# Patient Record
Sex: FEMALE | Race: WHITE | NOT HISPANIC OR LATINO | URBAN - METROPOLITAN AREA
[De-identification: names, ages, dates, MRNs, and addresses within clinical notes are randomized per-mention and may not be internally consistent; named-entity substitution may affect disease eponyms.]

---

## 2017-09-13 ENCOUNTER — INPATIENT (INPATIENT)
Facility: HOSPITAL | Age: 82
LOS: 2 days | Discharge: HOME | End: 2017-09-16
Attending: SURGERY | Admitting: INTERNAL MEDICINE

## 2017-09-13 DIAGNOSIS — K85.10 BILIARY ACUTE PANCREATITIS WITHOUT NECROSIS OR INFECTION: ICD-10-CM

## 2017-09-13 DIAGNOSIS — I82.409 ACUTE EMBOLISM AND THROMBOSIS OF UNSPECIFIED DEEP VEINS OF UNSPECIFIED LOWER EXTREMITY: ICD-10-CM

## 2017-09-13 DIAGNOSIS — I50.9 HEART FAILURE, UNSPECIFIED: ICD-10-CM

## 2017-09-13 DIAGNOSIS — E78.5 HYPERLIPIDEMIA, UNSPECIFIED: ICD-10-CM

## 2017-09-13 DIAGNOSIS — I10 ESSENTIAL (PRIMARY) HYPERTENSION: ICD-10-CM

## 2017-09-18 ENCOUNTER — TRANSCRIPTION ENCOUNTER (OUTPATIENT)
Age: 82
End: 2017-09-18

## 2017-09-18 PROBLEM — Z00.00 ENCOUNTER FOR PREVENTIVE HEALTH EXAMINATION: Status: ACTIVE | Noted: 2017-09-18

## 2017-09-20 DIAGNOSIS — E78.5 HYPERLIPIDEMIA, UNSPECIFIED: ICD-10-CM

## 2017-09-20 DIAGNOSIS — K85.10 BILIARY ACUTE PANCREATITIS WITHOUT NECROSIS OR INFECTION: ICD-10-CM

## 2017-09-20 DIAGNOSIS — Z86.718 PERSONAL HISTORY OF OTHER VENOUS THROMBOSIS AND EMBOLISM: ICD-10-CM

## 2017-09-20 DIAGNOSIS — K80.00 CALCULUS OF GALLBLADDER WITH ACUTE CHOLECYSTITIS WITHOUT OBSTRUCTION: ICD-10-CM

## 2017-09-20 DIAGNOSIS — I50.9 HEART FAILURE, UNSPECIFIED: ICD-10-CM

## 2017-09-20 DIAGNOSIS — I11.0 HYPERTENSIVE HEART DISEASE WITH HEART FAILURE: ICD-10-CM

## 2017-09-21 ENCOUNTER — APPOINTMENT (OUTPATIENT)
Dept: SURGERY | Facility: CLINIC | Age: 82
End: 2017-09-21
Payer: MEDICARE

## 2017-09-21 VITALS
SYSTOLIC BLOOD PRESSURE: 158 MMHG | BODY MASS INDEX: 19.69 KG/M2 | HEIGHT: 62 IN | DIASTOLIC BLOOD PRESSURE: 66 MMHG | WEIGHT: 107 LBS

## 2017-09-21 DIAGNOSIS — R19.7 DIARRHEA, UNSPECIFIED: ICD-10-CM

## 2017-09-21 DIAGNOSIS — K81.0 ACUTE CHOLECYSTITIS: ICD-10-CM

## 2017-09-21 PROCEDURE — 99024 POSTOP FOLLOW-UP VISIT: CPT | Mod: NC

## 2017-09-21 RX ORDER — METRONIDAZOLE 500 MG/1
500 TABLET ORAL 3 TIMES DAILY
Qty: 42 | Refills: 0 | Status: ACTIVE | COMMUNITY
Start: 2017-09-21 | End: 1900-01-01

## 2017-10-05 ENCOUNTER — APPOINTMENT (OUTPATIENT)
Dept: SURGERY | Facility: CLINIC | Age: 82
End: 2017-10-05

## 2018-03-28 ENCOUNTER — OUTPATIENT (OUTPATIENT)
Dept: OUTPATIENT SERVICES | Facility: HOSPITAL | Age: 83
LOS: 1 days | Discharge: HOME | End: 2018-03-28

## 2018-03-28 DIAGNOSIS — Z13.9 ENCOUNTER FOR SCREENING, UNSPECIFIED: ICD-10-CM

## 2018-03-28 DIAGNOSIS — D75.9 DISEASE OF BLOOD AND BLOOD-FORMING ORGANS, UNSPECIFIED: ICD-10-CM

## 2018-03-28 DIAGNOSIS — Z13.220 ENCOUNTER FOR SCREENING FOR LIPOID DISORDERS: ICD-10-CM

## 2018-03-28 DIAGNOSIS — E11.9 TYPE 2 DIABETES MELLITUS WITHOUT COMPLICATIONS: ICD-10-CM

## 2018-03-28 DIAGNOSIS — Z00.01 ENCOUNTER FOR GENERAL ADULT MEDICAL EXAMINATION WITH ABNORMAL FINDINGS: ICD-10-CM

## 2018-06-30 ENCOUNTER — INPATIENT (INPATIENT)
Facility: HOSPITAL | Age: 83
LOS: 1 days | Discharge: HOME | End: 2018-07-02
Attending: INTERNAL MEDICINE | Admitting: INTERNAL MEDICINE

## 2018-06-30 VITALS
TEMPERATURE: 100 F | SYSTOLIC BLOOD PRESSURE: 113 MMHG | RESPIRATION RATE: 18 BRPM | DIASTOLIC BLOOD PRESSURE: 59 MMHG | HEART RATE: 106 BPM | OXYGEN SATURATION: 96 %

## 2018-06-30 DIAGNOSIS — Z96.643 PRESENCE OF ARTIFICIAL HIP JOINT, BILATERAL: Chronic | ICD-10-CM

## 2018-06-30 DIAGNOSIS — Z90.49 ACQUIRED ABSENCE OF OTHER SPECIFIED PARTS OF DIGESTIVE TRACT: Chronic | ICD-10-CM

## 2018-06-30 LAB
ALBUMIN SERPL ELPH-MCNC: 3.8 G/DL — SIGNIFICANT CHANGE UP (ref 3.5–5.2)
ALP SERPL-CCNC: 74 U/L — SIGNIFICANT CHANGE UP (ref 30–115)
ALT FLD-CCNC: 11 U/L — SIGNIFICANT CHANGE UP (ref 0–41)
ANION GAP SERPL CALC-SCNC: 12 MMOL/L — SIGNIFICANT CHANGE UP (ref 7–14)
APPEARANCE UR: (no result)
APTT BLD: 33.9 SEC — SIGNIFICANT CHANGE UP (ref 27–39.2)
AST SERPL-CCNC: 17 U/L — SIGNIFICANT CHANGE UP (ref 0–41)
BACTERIA # UR AUTO: (no result) /HPF
BASE EXCESS BLDV CALC-SCNC: 2.1 MMOL/L — HIGH (ref -2–2)
BASOPHILS # BLD AUTO: 0.01 K/UL — SIGNIFICANT CHANGE UP (ref 0–0.2)
BASOPHILS NFR BLD AUTO: 0.1 % — SIGNIFICANT CHANGE UP (ref 0–1)
BILIRUB DIRECT SERPL-MCNC: <0.2 MG/DL — SIGNIFICANT CHANGE UP (ref 0–0.2)
BILIRUB INDIRECT FLD-MCNC: >0.1 MG/DL — LOW (ref 0.2–1.2)
BILIRUB SERPL-MCNC: 0.3 MG/DL — SIGNIFICANT CHANGE UP (ref 0.2–1.2)
BILIRUB SERPL-MCNC: 0.3 MG/DL — SIGNIFICANT CHANGE UP (ref 0.2–1.2)
BILIRUB UR-MCNC: NEGATIVE — SIGNIFICANT CHANGE UP
BUN SERPL-MCNC: 30 MG/DL — HIGH (ref 10–20)
CA-I SERPL-SCNC: 1.37 MMOL/L — HIGH (ref 1.12–1.3)
CALCIUM SERPL-MCNC: 10.3 MG/DL — HIGH (ref 8.5–10.1)
CHLORIDE SERPL-SCNC: 104 MMOL/L — SIGNIFICANT CHANGE UP (ref 98–110)
CK MB CFR SERPL CALC: 4.9 NG/ML — SIGNIFICANT CHANGE UP (ref 0.6–6.3)
CK SERPL-CCNC: 61 U/L — SIGNIFICANT CHANGE UP (ref 0–225)
CO2 SERPL-SCNC: 26 MMOL/L — SIGNIFICANT CHANGE UP (ref 17–32)
COLOR SPEC: YELLOW — SIGNIFICANT CHANGE UP
CREAT SERPL-MCNC: 1.1 MG/DL — SIGNIFICANT CHANGE UP (ref 0.7–1.5)
DIFF PNL FLD: NEGATIVE — SIGNIFICANT CHANGE UP
EOSINOPHIL # BLD AUTO: 0.03 K/UL — SIGNIFICANT CHANGE UP (ref 0–0.7)
EOSINOPHIL NFR BLD AUTO: 0.3 % — SIGNIFICANT CHANGE UP (ref 0–8)
GAS PNL BLDV: 145 MMOL/L — SIGNIFICANT CHANGE UP (ref 136–145)
GAS PNL BLDV: SIGNIFICANT CHANGE UP
GLUCOSE SERPL-MCNC: 113 MG/DL — HIGH (ref 70–99)
GLUCOSE UR QL: NEGATIVE MG/DL — SIGNIFICANT CHANGE UP
HCO3 BLDV-SCNC: 29 MMOL/L — SIGNIFICANT CHANGE UP (ref 22–29)
HCT VFR BLD CALC: 38.4 % — SIGNIFICANT CHANGE UP (ref 37–47)
HCT VFR BLDA CALC: 42.9 % — SIGNIFICANT CHANGE UP (ref 34–44)
HGB BLD CALC-MCNC: 14 G/DL — SIGNIFICANT CHANGE UP (ref 14–18)
HGB BLD-MCNC: 13 G/DL — SIGNIFICANT CHANGE UP (ref 12–16)
IMM GRANULOCYTES NFR BLD AUTO: 0.5 % — HIGH (ref 0.1–0.3)
INR BLD: 1.09 RATIO — SIGNIFICANT CHANGE UP (ref 0.65–1.3)
KETONES UR-MCNC: NEGATIVE — SIGNIFICANT CHANGE UP
LACTATE BLDV-MCNC: 1.9 MMOL/L — HIGH (ref 0.5–1.6)
LACTATE SERPL-SCNC: 2 MMOL/L — SIGNIFICANT CHANGE UP (ref 0.5–2.2)
LEUKOCYTE ESTERASE UR-ACNC: NEGATIVE — SIGNIFICANT CHANGE UP
LIDOCAIN IGE QN: 18 U/L — SIGNIFICANT CHANGE UP (ref 7–60)
LYMPHOCYTES # BLD AUTO: 0.99 K/UL — LOW (ref 1.2–3.4)
LYMPHOCYTES # BLD AUTO: 8.8 % — LOW (ref 20.5–51.1)
MAGNESIUM SERPL-MCNC: 1.9 MG/DL — SIGNIFICANT CHANGE UP (ref 1.8–2.4)
MCHC RBC-ENTMCNC: 31 PG — SIGNIFICANT CHANGE UP (ref 27–31)
MCHC RBC-ENTMCNC: 33.9 G/DL — SIGNIFICANT CHANGE UP (ref 32–37)
MCV RBC AUTO: 91.4 FL — SIGNIFICANT CHANGE UP (ref 81–99)
MONOCYTES # BLD AUTO: 0.57 K/UL — SIGNIFICANT CHANGE UP (ref 0.1–0.6)
MONOCYTES NFR BLD AUTO: 5.1 % — SIGNIFICANT CHANGE UP (ref 1.7–9.3)
NEUTROPHILS # BLD AUTO: 9.61 K/UL — HIGH (ref 1.4–6.5)
NEUTROPHILS NFR BLD AUTO: 85.2 % — HIGH (ref 42.2–75.2)
NITRITE UR-MCNC: POSITIVE
NRBC # BLD: 0 /100 WBCS — SIGNIFICANT CHANGE UP (ref 0–0)
NT-PROBNP SERPL-SCNC: 5744 PG/ML — HIGH (ref 0–300)
PCO2 BLDV: 53 MMHG — HIGH (ref 41–51)
PH BLDV: 7.34 — SIGNIFICANT CHANGE UP (ref 7.26–7.43)
PH UR: 6 — SIGNIFICANT CHANGE UP (ref 5–8)
PLATELET # BLD AUTO: 197 K/UL — SIGNIFICANT CHANGE UP (ref 130–400)
PO2 BLDV: 35 MMHG — SIGNIFICANT CHANGE UP (ref 20–40)
POTASSIUM BLDV-SCNC: 4.6 MMOL/L — SIGNIFICANT CHANGE UP (ref 3.3–5.6)
POTASSIUM SERPL-MCNC: 4.8 MMOL/L — SIGNIFICANT CHANGE UP (ref 3.5–5)
POTASSIUM SERPL-SCNC: 4.8 MMOL/L — SIGNIFICANT CHANGE UP (ref 3.5–5)
PROT SERPL-MCNC: 6.3 G/DL — SIGNIFICANT CHANGE UP (ref 6–8)
PROT UR-MCNC: (no result) MG/DL
PROTHROM AB SERPL-ACNC: 11.8 SEC — SIGNIFICANT CHANGE UP (ref 9.95–12.87)
RBC # BLD: 4.2 M/UL — SIGNIFICANT CHANGE UP (ref 4.2–5.4)
RBC # FLD: 13.1 % — SIGNIFICANT CHANGE UP (ref 11.5–14.5)
SAO2 % BLDV: 64 % — SIGNIFICANT CHANGE UP
SODIUM SERPL-SCNC: 142 MMOL/L — SIGNIFICANT CHANGE UP (ref 135–146)
SP GR SPEC: 1.01 — SIGNIFICANT CHANGE UP (ref 1.01–1.03)
TROPONIN T SERPL-MCNC: 0.06 NG/ML — CRITICAL HIGH
UROBILINOGEN FLD QL: 0.2 MG/DL — SIGNIFICANT CHANGE UP (ref 0.2–0.2)
WBC # BLD: 11.27 K/UL — HIGH (ref 4.8–10.8)
WBC # FLD AUTO: 11.27 K/UL — HIGH (ref 4.8–10.8)

## 2018-06-30 RX ORDER — FUROSEMIDE 40 MG
40 TABLET ORAL ONCE
Qty: 0 | Refills: 0 | Status: DISCONTINUED | OUTPATIENT
Start: 2018-06-30 | End: 2018-06-30

## 2018-06-30 RX ORDER — ACETAMINOPHEN 500 MG
650 TABLET ORAL ONCE
Qty: 0 | Refills: 0 | Status: COMPLETED | OUTPATIENT
Start: 2018-06-30 | End: 2018-06-30

## 2018-06-30 RX ORDER — HEPARIN SODIUM 5000 [USP'U]/ML
5000 INJECTION INTRAVENOUS; SUBCUTANEOUS EVERY 12 HOURS
Qty: 0 | Refills: 0 | Status: DISCONTINUED | OUTPATIENT
Start: 2018-06-30 | End: 2018-07-02

## 2018-06-30 RX ORDER — SODIUM CHLORIDE 9 MG/ML
1000 INJECTION INTRAMUSCULAR; INTRAVENOUS; SUBCUTANEOUS ONCE
Qty: 0 | Refills: 0 | Status: COMPLETED | OUTPATIENT
Start: 2018-06-30 | End: 2018-06-30

## 2018-06-30 RX ORDER — AZITHROMYCIN 500 MG/1
500 TABLET, FILM COATED ORAL ONCE
Qty: 0 | Refills: 0 | Status: COMPLETED | OUTPATIENT
Start: 2018-06-30 | End: 2018-06-30

## 2018-06-30 RX ORDER — FERROUS SULFATE 325(65) MG
325 TABLET ORAL EVERY 12 HOURS
Qty: 0 | Refills: 0 | Status: DISCONTINUED | OUTPATIENT
Start: 2018-06-30 | End: 2018-07-02

## 2018-06-30 RX ORDER — AZITHROMYCIN 500 MG/1
TABLET, FILM COATED ORAL
Qty: 0 | Refills: 0 | Status: DISCONTINUED | OUTPATIENT
Start: 2018-06-30 | End: 2018-07-02

## 2018-06-30 RX ORDER — CEFTRIAXONE 500 MG/1
1 INJECTION, POWDER, FOR SOLUTION INTRAMUSCULAR; INTRAVENOUS ONCE
Qty: 0 | Refills: 0 | Status: COMPLETED | OUTPATIENT
Start: 2018-06-30 | End: 2018-06-30

## 2018-06-30 RX ORDER — CEFTRIAXONE 500 MG/1
2 INJECTION, POWDER, FOR SOLUTION INTRAMUSCULAR; INTRAVENOUS EVERY 24 HOURS
Qty: 0 | Refills: 0 | Status: DISCONTINUED | OUTPATIENT
Start: 2018-06-30 | End: 2018-07-02

## 2018-06-30 RX ORDER — ASPIRIN/CALCIUM CARB/MAGNESIUM 324 MG
325 TABLET ORAL ONCE
Qty: 0 | Refills: 0 | Status: COMPLETED | OUTPATIENT
Start: 2018-06-30 | End: 2018-06-30

## 2018-06-30 RX ORDER — ATORVASTATIN CALCIUM 80 MG/1
10 TABLET, FILM COATED ORAL AT BEDTIME
Qty: 0 | Refills: 0 | Status: DISCONTINUED | OUTPATIENT
Start: 2018-06-30 | End: 2018-07-02

## 2018-06-30 RX ORDER — AZITHROMYCIN 500 MG/1
500 TABLET, FILM COATED ORAL EVERY 24 HOURS
Qty: 0 | Refills: 0 | Status: DISCONTINUED | OUTPATIENT
Start: 2018-07-01 | End: 2018-07-02

## 2018-06-30 RX ORDER — TETANUS TOXOID, REDUCED DIPHTHERIA TOXOID AND ACELLULAR PERTUSSIS VACCINE, ADSORBED 5; 2.5; 8; 8; 2.5 [IU]/.5ML; [IU]/.5ML; UG/.5ML; UG/.5ML; UG/.5ML
0.5 SUSPENSION INTRAMUSCULAR ONCE
Qty: 0 | Refills: 0 | Status: COMPLETED | OUTPATIENT
Start: 2018-06-30 | End: 2018-06-30

## 2018-06-30 RX ADMIN — ATORVASTATIN CALCIUM 10 MILLIGRAM(S): 80 TABLET, FILM COATED ORAL at 22:14

## 2018-06-30 RX ADMIN — CEFTRIAXONE 100 GRAM(S): 500 INJECTION, POWDER, FOR SOLUTION INTRAMUSCULAR; INTRAVENOUS at 16:04

## 2018-06-30 RX ADMIN — Medication 650 MILLIGRAM(S): at 16:03

## 2018-06-30 RX ADMIN — TETANUS TOXOID, REDUCED DIPHTHERIA TOXOID AND ACELLULAR PERTUSSIS VACCINE, ADSORBED 0.5 MILLILITER(S): 5; 2.5; 8; 8; 2.5 SUSPENSION INTRAMUSCULAR at 13:36

## 2018-06-30 RX ADMIN — AZITHROMYCIN 255 MILLIGRAM(S): 500 TABLET, FILM COATED ORAL at 18:38

## 2018-06-30 RX ADMIN — SODIUM CHLORIDE 1000 MILLILITER(S): 9 INJECTION INTRAMUSCULAR; INTRAVENOUS; SUBCUTANEOUS at 13:54

## 2018-06-30 RX ADMIN — Medication 650 MILLIGRAM(S): at 13:54

## 2018-06-30 RX ADMIN — Medication 325 MILLIGRAM(S): at 16:03

## 2018-06-30 NOTE — ED PROVIDER NOTE - CRITICAL CARE PROVIDED
consult w/ pt's family directly relating to pts condition/additional history taking/consultation with other physicians/documentation/direct patient care (not related to procedure)/interpretation of diagnostic studies

## 2018-06-30 NOTE — ED PROVIDER NOTE - OBJECTIVE STATEMENT
85 y f pmh htn, hpl, dementia, s/p r hip replacement ambulating with walker at baseline pw fall. Walking with her aide to the store and wanted to walk home instead of taking the bus. While walking, pt fell onto her knees. Aide says it looked like the pt's knees gave out. Fell onto R elbow. No LOC, no head trauma. Pt not on blood thinners. Pt denies cp, palpitations, sob, dizziness before fall. Pt denies cp, sob, dizziness, headache, back pain, extremity pain, abd pain in ED.

## 2018-06-30 NOTE — ED PROVIDER NOTE - PROGRESS NOTE DETAILS
pt with uti, abx being given, troponin noted, pt with no chest pain, no huang on ekg, asa being given, pending rest of imaging, will continue to monitor and reassess. Spoke with Dr. Umana, cardiology. Spoke with Dr. Umana, cardiology. Made cardiology aware of elevated troponin and bnp. Cardiology said that positive troponin and bnp is most likely 2/2 uti. Does not believe there is a component of ACS going on unless there is obvious signs of ischemia on ekg. Recommends repeat troponin, echo and starting abx. Will admit to telemetry. MAR aware of pt. and plan for admission to Tele and that pt may need bronchoscopy. aware of all labs and imaging. admission to tele.

## 2018-06-30 NOTE — ED ADULT NURSE REASSESSMENT NOTE - NS ED NURSE REASSESS COMMENT FT1
pt returned from CT scan , tolerated well. daughter at bedside was updated with received results at this time. PT and daughter aware of the plan of care and have no questions or concerns at this time. pt continue to be on continuos cardiac monitoring. Safety maintained, Will continue to monitor

## 2018-06-30 NOTE — H&P ADULT - NSHPPHYSICALEXAM_GEN_ALL_CORE
Vital Signs Last 24 Hrs  T(C): 36.5 (30 Jun 2018 18:41), Max: 38.7 (30 Jun 2018 13:15)  T(F): 97.7 (30 Jun 2018 18:41), Max: 101.6 (30 Jun 2018 13:15)  HR: 93 (30 Jun 2018 18:41) (93 - 106)  BP: 155/69 (30 Jun 2018 18:41) (113/59 - 155/69)  BP(mean): --  RR: 18 (30 Jun 2018 18:41) (18 - 18)  SpO2: 100% (30 Jun 2018 18:41) (96% - 100%)  General: NAD, resting comfortably in bed  HEENT: NCAT, anicateric sclera, anicteric phrenulum, PERRLA, EOMI, moist mucous membranes, nonerythematous oropharynx, non exudative tonsils  Heart: S1, S2, RRR, no audible murm  Lungs: DAEB L > R, diffuse rhonchi bilaterally, no wheezing  Abdomen: soft, NTND, +BS  Extremities: +1 PP dp b/l, +1 LLE b/l with R > L, negative Guadalupe, no calf tenderness  Neuro: AAOx2, no focal neurological deficits, sensation intact bilaterally, +1 DTR patellar b/l Vital Signs Last 24 Hrs  T(C): 36.5 (30 Jun 2018 18:41), Max: 38.7 (30 Jun 2018 13:15)  T(F): 97.7 (30 Jun 2018 18:41), Max: 101.6 (30 Jun 2018 13:15)  HR: 93 (30 Jun 2018 18:41) (93 - 106)  BP: 155/69 (30 Jun 2018 18:41) (113/59 - 155/69)  BP(mean): --  RR: 18 (30 Jun 2018 18:41) (18 - 18)  SpO2: 100% (30 Jun 2018 18:41) (96% - 100%)  General: NAD, resting comfortably in bed  HEENT: NCAT, anicateric sclera, anicteric phrenulum, PERRLA, EOMI, moist mucous membranes, nonerythematous oropharynx, non exudative tonsils  Heart: S1, S2, RRR, no audible murm  Lungs: DAEB L > R, diffuse rhonchi bilaterally, no wheezing  Abdomen: soft, NTND, +BS  Extremities: +1 PP dp b/l, +1 LLE b/l with R > L, negative Guadalupe, no calf tenderness, negative elbow tenderness b/l  Neuro: AAOx2, no focal neurological deficits, sensation intact bilaterally, +1 DTR patellar b/l  Skin: no rash, no bruises, no ecchymosiswssw Vital Signs Last 24 Hrs  T(C): 36.5 (30 Jun 2018 18:41), Max: 38.7 (30 Jun 2018 13:15)  T(F): 97.7 (30 Jun 2018 18:41), Max: 101.6 (30 Jun 2018 13:15)  HR: 93 (30 Jun 2018 18:41) (93 - 106)  BP: 155/69 (30 Jun 2018 18:41) (113/59 - 155/69)  BP(mean): --  RR: 18 (30 Jun 2018 18:41) (18 - 18)  SpO2: 100% (30 Jun 2018 18:41) (96% - 100%)  General: NAD, resting comfortably in bed  HEENT: NCAT, anicateric sclera, anicteric phrenulum, PERRLA, EOMI, moist mucous membranes, nonerythematous oropharynx, non exudative tonsils  Heart: S1, S2, RRR, no audible murmur  Lungs: DAEB L > R, diffuse rhonchi bilaterally, no wheezing  Abdomen: soft, NTND, +BS  Extremities: +1 PP dp b/l, +1 LLE b/l with R > L, negative Guadalupe, no calf tenderness, negative elbow tenderness b/l  Neuro: AAOx2, no focal neurological deficits, sensation intact bilaterally, CN II-XII nl, no slurry speech, +1 DTR patellar b/l  Skin: no rash, no bruises, no ecchymosis Vital Signs Last 24 Hrs  T(C): 36.5 (30 Jun 2018 18:41), Max: 38.7 (30 Jun 2018 13:15)  T(F): 97.7 (30 Jun 2018 18:41), Max: 101.6 (30 Jun 2018 13:15)  HR: 93 (30 Jun 2018 18:41) (93 - 106)  BP: 155/69 (30 Jun 2018 18:41) (113/59 - 155/69)  BP(mean): --  RR: 18 (30 Jun 2018 18:41) (18 - 18)  SpO2: 100% (30 Jun 2018 18:41) (96% - 100%)  General: NAD, resting comfortably in bed  HEENT: NCAT, anicateric sclera, anicteric phrenulum, PERRLA, EOMI, moist mucous membranes, nonerythematous oropharynx, non exudative tonsils  Heart/CVS: S1, S2, RRR, no audible murmur  Lungs/RESP: DAEB L > R, diffuse rhonchi bilaterally, no wheezing  Abdomen/GI: soft, NTND, +BS  Extremities: +1 PP dp b/l, +1 LLE b/l with R > L, negative Guadalupe, no calf tenderness, negative elbow tenderness b/l  Neuro: AAOx2, no focal neurological deficits, sensation intact bilaterally, CN II-XII nl, no slurry speech, +1 DTR patellar b/l  Skin: no rash, no bruises, no ecchymosis

## 2018-06-30 NOTE — H&P ADULT - NSHPLABSRESULTS_GEN_ALL_CORE
Labs                        13.0   11.27 )-----------( 197      ( 2018 13:00 )             38.4         142  |  104  |  30<H>  ----------------------------<  113<H>  4.8   |  26  |  1.1    Ca    10.3<H>      2018 13:00  Mg     1.9         TPro  6.3  /  Alb  3.8  /  TBili  0.3  /  DBili  <0.2  /  AST  17  /  ALT  11  /  AlkPhos  74      PT/INR - ( 2018 13:00 )   PT: 11.80 sec;   INR: 1.09 ratio    PTT - ( 2018 13: )  PTT:33.9 sec    Lipase, Serum: 18 U/L (18 @ 13:00)    Urinalysis Basic - ( 2018 13:00 )    Color: Yellow / Appearance: Cloudy / S.015 / pH: x  Gluc: x / Ketone: Negative  / Bili: Negative / Urobili: 0.2 mg/dL   Blood: x / Protein: Trace mg/dL / Nitrite: Positive   Leuk Esterase: Negative / RBC: x / WBC x   Sq Epi: x / Non Sq Epi: x / Bacteria: Moderate /HPF      Cardiology  CARDIAC MARKERS ( 2018 13:00 )  x     / 0.06 ng/mL / 61 U/L / x     / 4.9 ng/mL    Serum Pro-Brain Natriuretic Peptide: 5744 pg/mL (18 @ 13:00)    TTE 2017:  Summary  Left ventricle: Systolic function was normal. Ejection fraction was estimated  in the range of 55 % to 65 %. There were no regional wall motion  abnormalities. Wall thickness was moderately increased. Doppler parameters  were consistent with abnormal left ventricular relaxation (grade 1 diastolic  dysfunction).  Mitral valve: There was mild regurgitation.  Left atrium: The atrium was mildly dilated.  Pulmonic valve: There was mild to moderate regurgitation.  Pulmonary arteries: Systolic pressure was moderately increased. Estimated peak  pressure was in the range of 50 mmHg to 60 mmHg.  Tricuspid valve: There was moderate regurgitation.    TTE this admission pending      Radiology  < from: Xray Chest 1 View- PORTABLE-Urgent (18 @ 14:09) >  Impression:   No radiographic evidence of acute cardiopulmonary disease.  < end of copied text >    < from: Xray Pelvis AP only (18 @ 14:09) >  Impression: Status post ORIF of the right hip. Heterotopic ossification as above. Osteopenia without evidence of an acute fracture.  Degenerative changes as above. < end of copied text >    < from: Xray Elbow AP + Lateral + Oblique, Right (18 @ 14:10) >  Impression: Negative study.  < end of copied text >    < from: CT Cervical Spine No Cont (18 @ 15:53) >  There are chronic compression fractures of T1 and T3 without CT evidence of retropulsion of bone into the spinal canal.  IMPRESSION: In comparison with the prior noncontrast scan of the cervical spine dated  2015: Stable examination. No acute fracture demonstrated. < end of copied text >    < from: CT Head No Cont (18 @ 15:53) >  IMPRESSION: In comparison with the prior noncontrast CT scan of the brain dated  2015: Stable examination. No acute intracranial hemorrhage.  Focal area of diminished attenuation in the left basal ganglia and  adjacent periventricular white matter with porencephalic dilatation of  the adjacent left lateral ventricle consistent with chronic infarct.  Patchy foci of diminished attenuation in the periventricular white matter  chronic ischemic change.  Vascular calcifications are noted. There is a lucent lesion in the right parietal bone and a lucent lesion in the left parietal bone which are nonspecific but unchanged from prior study.  < end of copied text > Labs                        13.0   11.27 )-----------( 197      ( 2018 13:00 )             38.4         142  |  104  |  30<H>  ----------------------------<  113<H>  4.8   |  26  |  1.1    Ca    10.3<H>      2018 13:00  Mg     1.9         TPro  6.3  /  Alb  3.8  /  TBili  0.3  /  DBili  <0.2  /  AST  17  /  ALT  11  /  AlkPhos  74      PT/INR - ( 2018 13:00 )   PT: 11.80 sec;   INR: 1.09 ratio    PTT - ( 2018 13: )  PTT:33.9 sec    Lipase, Serum: 18 U/L (18 @ 13:00)    Urinalysis Basic - ( 2018 13:00 )    Color: Yellow / Appearance: Cloudy / S.015 / pH: x  Gluc: x / Ketone: Negative  / Bili: Negative / Urobili: 0.2 mg/dL   Blood: x / Protein: Trace mg/dL / Nitrite: Positive   Leuk Esterase: Negative / RBC: x / WBC x   Sq Epi: x / Non Sq Epi: x / Bacteria: Moderate /HPF      Cardiology  CARDIAC MARKERS ( 2018 13:00 )  x     / 0.06 ng/mL / 61 U/L / x     / 4.9 ng/mL    Serum Pro-Brain Natriuretic Peptide: 5744 pg/mL (18 @ 13:00)    TTE 2017:  Summary  Left ventricle: Systolic function was normal. Ejection fraction was estimated  in the range of 55 % to 65 %. There were no regional wall motion  abnormalities. Wall thickness was moderately increased. Doppler parameters  were consistent with abnormal left ventricular relaxation (grade 1 diastolic  dysfunction).  Mitral valve: There was mild regurgitation.  Left atrium: The atrium was mildly dilated.  Pulmonic valve: There was mild to moderate regurgitation.  Pulmonary arteries: Systolic pressure was moderately increased. Estimated peak  pressure was in the range of 50 mmHg to 60 mmHg.  Tricuspid valve: There was moderate regurgitation.    TTE this admission pending      Radiology  < from: Xray Chest 1 View- PORTABLE-Urgent (18 @ 14:09) >  Impression:   No radiographic evidence of acute cardiopulmonary disease.  < end of copied text >    < from: Xray Pelvis AP only (18 @ 14:09) >  Impression: Status post ORIF of the right hip. Heterotopic ossification as above. Osteopenia without evidence of an acute fracture.  Degenerative changes as above. < end of copied text >    < from: Xray Elbow AP + Lateral + Oblique, Right (18 @ 14:10) >  Impression: Negative study.  < end of copied text >    < from: CT Cervical Spine No Cont (18 @ 15:53) >  There are chronic compression fractures of T1 and T3 without CT evidence of retropulsion of bone into the spinal canal.  IMPRESSION: In comparison with the prior noncontrast scan of the cervical spine dated  2015: Stable examination. No acute fracture demonstrated. < end of copied text >    < from: CT Head No Cont (18 @ 15:53) >  IMPRESSION: In comparison with the prior noncontrast CT scan of the brain dated  2015: Stable examination. No acute intracranial hemorrhage.  Focal area of diminished attenuation in the left basal ganglia and  adjacent periventricular white matter with porencephalic dilatation of  the adjacent left lateral ventricle consistent with chronic infarct.  Patchy foci of diminished attenuation in the periventricular white matter  chronic ischemic change.  Vascular calcifications are noted. There is a lucent lesion in the right parietal bone and a lucent lesion in the left parietal bone which are nonspecific but unchanged from prior study.  < end of copied text >    < from: CT Angio Chest w/ IV Cont (18 @ 15:53) >  IMPRESSION:  No central or lobar pulmonary embolism.  Left lower lobe nodular opacity in the region of branching bronchi, may  represent mucus impaction. Bronchoscopy is recommended. Enlarged main pulmonary artery measuring 3.4 cm.  Enlarged heart.  < end of copied text >

## 2018-06-30 NOTE — ED PROVIDER NOTE - ATTENDING CONTRIBUTION TO CARE
I personally evaluated the patient. I reviewed the Resident’s or Physician Assistant’s note (as assigned above), and agree with the findings and plan except as documented in my note.  A 84 y/o f, smoker, pmhx of dementia, dld, htn, R hip replacement 3  years ago presents s/p fall where daughter and aide report pt was walking for 2 hours to go to [facility] as she refused to take bus tripped and fell onto back and r elbow, sustaining skin tear to R elbow, witnessed by side, reporting no head trauma, no loc. called for ems. ems report pt was noted to be hypoxic on scene to low 80 improved on nc, aide and daughter report pt is not on home o2. (+) cough on presentation to ed. pt is a poor historian able to answer questions, reports no symptoms at this time. I personally evaluated the patient. I reviewed the Resident’s or Physician Assistant’s note (as assigned above), and agree with the findings and plan except as documented in my note.  A 86 y/o f, smoker, pmhx of dementia, dld, htn, R hip replacement 3  years ago presents s/p fall where daughter and aide report pt was walking for 2 hours to go to [facility] as she refused to take bus tripped and fell onto back and r elbow, sustaining skin tear to R elbow, witnessed by side, reporting no head trauma, no loc. called for ems. ems report pt was noted to be hypoxic on scene to low 80 improved on nc, aide and daughter report pt is not on home o2. (+) cough on presentation to ed. pt is a poor historian able to answer questions, reports no symptoms at this time. denies fever, chills, n/v, cp, sob, pleuritic chest pain, palpitations, diaphoresis, chest wall/rib pain, blurry vision/visual changes, neck pain/stiffness, back pain, abd pain, hip pain, weakness, numbness/tingling, HA/LH/dizziness, lacerations, ecchymoses, or swelling. GCS15.  On Exam: Vital Signs: I have reviewed the initial vital signs.  Constitutional: WDWN in nad speaking full sentences.  HEAD: No signs of basilar skull fracture.  Integumentary: No rash. No ecchymoses or swelling. (+) R elbow skin tear.  ENT: MMM. No rhinorrhea/otorrhea. No septal hematoma. No mastoid ecchymoses.  NECK: Supple, non-tender, no spinous tenderness to neck. No palpable shelves or step-offs.  BACK: No spinous tenderness. No palpable shelves or step-offs.  Cardiovascular: RRR, radial pulses 2/4 b/l. No pain to palpation to chest wall.  Respiratory: BS present b/l, crackles present to lower lung base, particularly to R lower lung base, poor air exchange, poor resp effort and excursion, no accessory muscle use, no stridor. No pain to palpation to ribs b/l. No crepitus.  Gastrointestinal: BS present throughout all 4 quadrants, soft, nd, nt no rebound tenderness or guarding, no cvat.  Musculoskeletal: FROM, 1+ pitting edema, no hip pain to palpation. No short leg. No internal or external rotation of LE.  Neurologic: GCS 15. AAOx2- baseline per daughter and aide, motor 5/5 and sensation intact throughout upper and lowee ext, CN II-XII intact, No facial droop or slurring of speech. (-) Pronator No dysmteria w. ftn or rapid alternating fine movements. No focal deficits. I personally evaluated the patient. I reviewed the Resident’s or Physician Assistant’s note (as assigned above), and agree with the findings and plan except as documented in my note.  A 84 y/o f, smoker, pmhx of dementia, dld, htn, R hip replacement 3  years ago presents s/p fall where daughter and aide report pt was walking for 2 hours to go to [facility] as she refused to take bus , legs gave out, and fell onto back and r elbow, sustaining skin tear to R elbow, witnessed by side, reporting no head trauma, no loc. called for ems. ems report pt was noted to be hypoxic on scene to low 80 improved on nc, aide and daughter report pt is not on home o2. (+) cough on presentation to ed. pt is a poor historian able to answer questions, reports no symptoms at this time. denies fever, chills, n/v, cp, sob, pleuritic chest pain, palpitations, diaphoresis, chest wall/rib pain, blurry vision/visual changes, neck pain/stiffness, back pain, abd pain, hip pain, weakness, numbness/tingling, HA/LH/dizziness, lacerations, ecchymoses, or swelling. GCS15.  On Exam: Vital Signs: I have reviewed the initial vital signs.  Constitutional: WDWN in nad speaking full sentences.  HEAD: No signs of basilar skull fracture.  Integumentary: No rash. No ecchymoses or swelling. (+) R elbow skin tear.  ENT: MMM. No rhinorrhea/otorrhea. No septal hematoma. No mastoid ecchymoses.  NECK: Supple, non-tender, no spinous tenderness to neck. No palpable shelves or step-offs.  BACK: No spinous tenderness. No palpable shelves or step-offs.  Cardiovascular: RRR, radial pulses 2/4 b/l. No pain to palpation to chest wall.  Respiratory: BS present b/l, crackles present to lower lung base, particularly to R lower lung base, poor air exchange, poor resp effort and excursion, no accessory muscle use, no stridor. No pain to palpation to ribs b/l. No crepitus.  Gastrointestinal: BS present throughout all 4 quadrants, soft, nd, nt no rebound tenderness or guarding, no cvat.  Musculoskeletal: FROM, 1+ pitting edema, no hip pain to palpation. No short leg. No internal or external rotation of LE.  Neurologic: GCS 15. AAOx2- baseline per daughter and aide, motor 5/5 and sensation intact throughout upper and lowee ext, CN II-XII intact, No facial droop or slurring of speech. (-) Pronator No dysmteria w. ftn or rapid alternating fine movements. No focal deficits. I personally evaluated the patient. I reviewed the Resident’s or Physician Assistant’s note (as assigned above), and agree with the findings and plan except as documented in my note.  A 86 y/o f, smoker, pmhx of dementia, dld, htn, R hip replacement 3  years ago presents s/p fall where daughter and aide report pt was walking for 2 hours to go to [facility] as she refused to take bus , legs gave out, and fell onto back and r elbow, sustaining skin tear to R elbow, witnessed by side, reporting no head trauma, no loc. called for ems. ems report pt was noted to be hypoxic on scene to low 80 improved on nc, aide and daughter report pt is not on home o2. (+) cough on presentation to ed. pt is a poor historian able to answer questions, reports no symptoms at this time. denies fever, chills, n/v, cp, sob, pleuritic chest pain, palpitations, diaphoresis, chest wall/rib pain, blurry vision/visual changes, neck pain/stiffness, back pain, abd pain, hip pain, weakness, numbness/tingling, HA/LH/dizziness, lacerations, ecchymoses, or swelling. GCS15.  On Exam: Vital Signs: I have reviewed the initial vital signs.  Constitutional: WDWN in nad speaking full sentences.  HEAD: No signs of basilar skull fracture.  Integumentary: No rash. No ecchymoses or swelling. (+) R elbow skin tear.  ENT: MMM. No rhinorrhea/otorrhea. No septal hematoma. No mastoid ecchymoses.  NECK: Supple, non-tender, no spinous tenderness to neck. No palpable shelves or step-offs.  BACK: No spinous tenderness. No palpable shelves or step-offs.  Cardiovascular: RRR, radial pulses 2/4 b/l. No pain to palpation to chest wall.  Respiratory: BS present b/l, crackles present to lower lung base, particularly to R lower lung base, poor air exchange, poor resp effort and excursion, no accessory muscle use, no stridor. No pain to palpation to ribs b/l. No crepitus.  Gastrointestinal: BS present throughout all 4 quadrants, soft, nd, nt no rebound tenderness or guarding, no cvat.  Musculoskeletal: FROM, 1+ pitting edema, no hip pain to palpation. No short leg. No internal or external rotation of LE. RLE circumference> LLE circumference.  Neurologic: GCS 15. AAOx2- baseline per daughter and aide, motor 5/5 and sensation intact throughout upper and lowee ext, CN II-XII intact, No facial droop or slurring of speech. (-) Pronator No dysmteria w. ftn or rapid alternating fine movements. No focal deficits. I personally evaluated the patient. I reviewed the Resident’s or Physician Assistant’s note (as assigned above), and agree with the findings and plan except as documented in my note.  A 84 y/o f, smoker, pmhx of dementia, dld, htn, R hip replacement 2 years ago presents s/p fall where daughter and aide report pt was walking for 2 hours to go to [facility] as she refused to take bus , legs gave out, and fell onto back and r elbow, sustaining skin tear to R elbow, witnessed by side, reporting no head trauma, no loc. called for ems. ems report pt was noted to be hypoxic on scene to low 80 improved on nc, aide and daughter report pt is not on home o2. (+) cough on presentation to ed. pt is a poor historian able to answer questions, reports no symptoms at this time. denies fever, chills, n/v, cp, sob, pleuritic chest pain, palpitations, diaphoresis, chest wall/rib pain, blurry vision/visual changes, neck pain/stiffness, back pain, abd pain, hip pain, weakness, numbness/tingling, HA/LH/dizziness, lacerations, ecchymoses, or swelling. GCS15.  On Exam: Vital Signs: I have reviewed the initial vital signs.  Constitutional: WDWN in nad speaking full sentences.  HEAD: No signs of basilar skull fracture.  Integumentary: No rash. No ecchymoses or swelling. (+) R elbow skin tear.  ENT: MMM. No rhinorrhea/otorrhea. No septal hematoma. No mastoid ecchymoses.  NECK: Supple, non-tender, no spinous tenderness to neck. No palpable shelves or step-offs.  BACK: No spinous tenderness. No palpable shelves or step-offs.  Cardiovascular: RRR, radial pulses 2/4 b/l. No pain to palpation to chest wall.  Respiratory: BS present b/l, crackles present to b/l lower lung base, particularly to R lower lung base, poor air exchange, poor resp effort and excursion, no accessory muscle use, no stridor. No pain to palpation to ribs b/l. No crepitus.  Gastrointestinal: BS present throughout all 4 quadrants, soft, nd, nt no rebound tenderness or guarding, no cvat.  Musculoskeletal: FROM, 1+ pitting edema, no hip pain to palpation. No short leg. No internal or external rotation of LE. RLE circumference> LLE circumference.  Neurologic: GCS 15. AAOx2- baseline per daughter and aide, motor 5/5 and sensation intact throughout upper and lowee ext, CN II-XII intact, No facial droop or slurring of speech. (-) Pronator No dysmteria w. ftn or rapid alternating fine movements. No focal deficits.

## 2018-06-30 NOTE — ED ADULT NURSE NOTE - OBJECTIVE STATEMENT
pt brought in to the ED by ambulance s/p a mechanical fall in the street while accompanied by the home health aid. the aid denies head trauma , or loc. pt denies pain or discomfort upon arrival . no obvious trauma noted. right elbow superficial abrasion noted. pt is confused at times as per the daughter at bed side. redness noted to bilateral lower extremity, pt denies pain. pt with stage one pressure ulcer to right and left buttock.

## 2018-06-30 NOTE — ED ADULT TRIAGE NOTE - CHIEF COMPLAINT QUOTE
Pt BIBA s/p fall after knees gave out, Pt was outside walking x 3 hrs. Unknown medications, no LOC, denies hitting head.

## 2018-06-30 NOTE — H&P ADULT - ASSESSMENT
85 year old female with a past medical history of HTN and Grade 1 Diastolic Dysfunction presenting for mechanical fall of same day duration. 85 year old female with a past medical history of HTN and Grade 1 Diastolic Dysfunction presenting for mechanical fall of same day duration.    Mechanical fall without syncope; negative trauma workup  - CXR, Xray elbow, Xray pelvis negative.  - CTH negative for ICH  - CTAC negative for PE or pleural effusions; Left branching bronchi mucous impaction  - Bedrest until seen by PT; consult placed  - Fall precautions    Mucous impaction left branching bronchi +/- PNA  - VS stable, asymptomatic, Sat 100% on RA  - Azithromycin 500 q24h and Ceftriaxone 2g q24h  - Aspiration precautions, HOB  - Pulmonary consult for possible bronchoscopy  - Mechanical soft diet with 1:1 supervision until seen by S&S  - F/u CXR in AM    UTI  - UA +ve, Asymptomatic, VS stable  - Ceftriaxone 2g q24h  - F/u urine and blood cx    Type II NSTEMI secondary to UTI  - Elevated troponin, f/u repeat cardiac enzymes at 11:30pm and 4:30am  - Given  mg in ED  - Seen by Cardiology, EKG NSR with PVCs. Consult placed for follow up.  - Last TTE in 2017 showed normal LV function  - Admit to telemetry for monitoring, approved by Cardiology  - Pending repeat TTE  - Elevated BNP and G1DD on prior TTE  - Atorvastatin 10mg for ACS preventive measures    HTN; enalapril 10 mg    Mechanical soft diet until seen by S&S  Bed rest until seen by PT  DVT ppx; Heparin 5k sq bid  Full code; daughter will bring healthcare proxy documents tomorrow  Lives in daughter's basement and uses walker 85 year old female with a past medical history of HTN and Grade 1 Diastolic Dysfunction presenting for mechanical fall of same day duration.    Mechanical fall without syncope; negative trauma workup  - CXR, Xray elbow, Xray pelvis negative.  - CTH negative for ICH  - CTAC negative for PE or pleural effusions; Left branching bronchi mucous impaction  - Bedrest until seen by PT; consult placed  - Fall precautions    Mucous impaction left branching bronchi +/- PNA  - VS stable, asymptomatic, Sat 100% on RA  - Azithromycin 500 q24h and Ceftriaxone 2g q24h; received doses in ED  - Aspiration precautions, HOB  - Pulmonary consult for possible bronchoscopy  - Mechanical soft diet with 1:1 supervision until seen by S&S  - F/u CXR in AM    UTI  - UA +ve, Asymptomatic, VS stable  - Ceftriaxone 2g q24h  - F/u urine and blood cx  - Labs ordered for Sunday and Monday    Type II NSTEMI secondary to UTI  - Elevated troponin, f/u repeat cardiac enzymes at 11:30pm and 4:30am  - Given  mg in ED  - Seen by Cardiology, EKG NSR with PVCs. Consult placed for follow up.  - Last TTE in 2017 showed normal LV function  - Admit to telemetry for monitoring, approved by Cardiology  - Pending repeat TTE  - Elevated BNP and G1DD on prior TTE  - Atorvastatin 10mg for ACS preventive measures    HTN; enalapril 10 mg    Mechanical soft diet until seen by S&S  Bed rest until seen by PT  DVT ppx; Heparin 5k sq bid  Full code; daughter will bring healthcare proxy documents tomorrow  Lives in daughter's basement and uses walker

## 2018-06-30 NOTE — ED PROVIDER NOTE - CARE PLAN
Assessment and plan of treatment:	Plan: Monitor, NC, EKG, CXR, labs, CT Head, Neck, CT Angio chest for PE study, urine, reassess. Principal Discharge DX:	Fall  Assessment and plan of treatment:	Plan: Monitor, NC, EKG, CXR, labs, CT Head, Neck, CT Angio chest for PE study, urine, reassess. Principal Discharge DX:	Fall  Assessment and plan of treatment:	Plan: Monitor, NC, EKG, CXR, labs, CT Head, Neck, CT Angio chest for PE study, urine, reassess.  Secondary Diagnosis:	Sepsis  Secondary Diagnosis:	UTI (urinary tract infection)

## 2018-06-30 NOTE — ED ADULT NURSE NOTE - CHIEF COMPLAINT QUOTE
Pt BIBA s/p fall after knees gave out, Pt was outside walking x 3 hrs. Unknown medications, no LOC, denies hitting head. no

## 2018-06-30 NOTE — H&P ADULT - ATTENDING COMMENTS
Patient seen and examined independently. Resident's H & P reviewed. Agree with the findings and plan of care except,     An 85 years old female presented with mechanical fall. No LOC. No CP. No palpitations or dizziness. EKG showed Bigeminies.    ASSESSMENT:    1. Mechanical Fall  2. Bigeminies  3. HTN    PLAN.    . Tele for 24 hrs  . 2DECHO  . Card eval  . Rehab eval

## 2018-06-30 NOTE — ED PROVIDER NOTE - MEDICAL DECISION MAKING DETAILS
pt on monitor, nc in place as pt becomes hypoxic off NC, current 100 on 2L, imaging noted with bnp, pt report no chest pain, no sob, no symptoms at this time, given asa, abx for uti, cardiology also aware of pt due to elevated troponin, agree with plan for tele at this time, will speak to medical admitting team and admit as discussed and agreed with pt and daughter at bedside.

## 2018-07-01 LAB
ALBUMIN SERPL ELPH-MCNC: 3.5 G/DL — SIGNIFICANT CHANGE UP (ref 3.5–5.2)
ALP SERPL-CCNC: 75 U/L — SIGNIFICANT CHANGE UP (ref 30–115)
ALT FLD-CCNC: 15 U/L — SIGNIFICANT CHANGE UP (ref 0–41)
ANION GAP SERPL CALC-SCNC: 9 MMOL/L — SIGNIFICANT CHANGE UP (ref 7–14)
AST SERPL-CCNC: 28 U/L — SIGNIFICANT CHANGE UP (ref 0–41)
BASOPHILS # BLD AUTO: 0.01 K/UL — SIGNIFICANT CHANGE UP (ref 0–0.2)
BASOPHILS NFR BLD AUTO: 0.2 % — SIGNIFICANT CHANGE UP (ref 0–1)
BILIRUB SERPL-MCNC: 0.4 MG/DL — SIGNIFICANT CHANGE UP (ref 0.2–1.2)
BUN SERPL-MCNC: 26 MG/DL — HIGH (ref 10–20)
CALCIUM SERPL-MCNC: 9.6 MG/DL — SIGNIFICANT CHANGE UP (ref 8.5–10.1)
CHLORIDE SERPL-SCNC: 106 MMOL/L — SIGNIFICANT CHANGE UP (ref 98–110)
CK MB CFR SERPL CALC: 7.4 NG/ML — HIGH (ref 0.6–6.3)
CK MB CFR SERPL CALC: 7.9 NG/ML — HIGH (ref 0.6–6.3)
CK SERPL-CCNC: 1105 U/L — HIGH (ref 0–225)
CK SERPL-CCNC: 921 U/L — HIGH (ref 0–225)
CO2 SERPL-SCNC: 29 MMOL/L — SIGNIFICANT CHANGE UP (ref 17–32)
CREAT SERPL-MCNC: 0.9 MG/DL — SIGNIFICANT CHANGE UP (ref 0.7–1.5)
EOSINOPHIL # BLD AUTO: 0.06 K/UL — SIGNIFICANT CHANGE UP (ref 0–0.7)
EOSINOPHIL NFR BLD AUTO: 1 % — SIGNIFICANT CHANGE UP (ref 0–8)
GLUCOSE SERPL-MCNC: 83 MG/DL — SIGNIFICANT CHANGE UP (ref 70–99)
HCT VFR BLD CALC: 36.9 % — LOW (ref 37–47)
HGB BLD-MCNC: 11.9 G/DL — LOW (ref 12–16)
IMM GRANULOCYTES NFR BLD AUTO: 0.3 % — SIGNIFICANT CHANGE UP (ref 0.1–0.3)
LYMPHOCYTES # BLD AUTO: 1.01 K/UL — LOW (ref 1.2–3.4)
LYMPHOCYTES # BLD AUTO: 16.7 % — LOW (ref 20.5–51.1)
MAGNESIUM SERPL-MCNC: 1.9 MG/DL — SIGNIFICANT CHANGE UP (ref 1.8–2.4)
MCHC RBC-ENTMCNC: 30.4 PG — SIGNIFICANT CHANGE UP (ref 27–31)
MCHC RBC-ENTMCNC: 32.2 G/DL — SIGNIFICANT CHANGE UP (ref 32–37)
MCV RBC AUTO: 94.1 FL — SIGNIFICANT CHANGE UP (ref 81–99)
MONOCYTES # BLD AUTO: 0.32 K/UL — SIGNIFICANT CHANGE UP (ref 0.1–0.6)
MONOCYTES NFR BLD AUTO: 5.3 % — SIGNIFICANT CHANGE UP (ref 1.7–9.3)
NEUTROPHILS # BLD AUTO: 4.63 K/UL — SIGNIFICANT CHANGE UP (ref 1.4–6.5)
NEUTROPHILS NFR BLD AUTO: 76.5 % — HIGH (ref 42.2–75.2)
NRBC # BLD: 0 /100 WBCS — SIGNIFICANT CHANGE UP (ref 0–0)
PLATELET # BLD AUTO: 148 K/UL — SIGNIFICANT CHANGE UP (ref 130–400)
POTASSIUM SERPL-MCNC: 4.8 MMOL/L — SIGNIFICANT CHANGE UP (ref 3.5–5)
POTASSIUM SERPL-SCNC: 4.8 MMOL/L — SIGNIFICANT CHANGE UP (ref 3.5–5)
PROT SERPL-MCNC: 5.7 G/DL — LOW (ref 6–8)
RBC # BLD: 3.92 M/UL — LOW (ref 4.2–5.4)
RBC # FLD: 13.2 % — SIGNIFICANT CHANGE UP (ref 11.5–14.5)
SODIUM SERPL-SCNC: 144 MMOL/L — SIGNIFICANT CHANGE UP (ref 135–146)
TROPONIN T SERPL-MCNC: 0.06 NG/ML — CRITICAL HIGH
TROPONIN T SERPL-MCNC: 0.07 NG/ML — CRITICAL HIGH
WBC # BLD: 6.05 K/UL — SIGNIFICANT CHANGE UP (ref 4.8–10.8)
WBC # FLD AUTO: 6.05 K/UL — SIGNIFICANT CHANGE UP (ref 4.8–10.8)

## 2018-07-01 RX ADMIN — Medication 325 MILLIGRAM(S): at 18:50

## 2018-07-01 RX ADMIN — Medication 10 MILLIGRAM(S): at 06:16

## 2018-07-01 RX ADMIN — ATORVASTATIN CALCIUM 10 MILLIGRAM(S): 80 TABLET, FILM COATED ORAL at 22:20

## 2018-07-01 RX ADMIN — HEPARIN SODIUM 5000 UNIT(S): 5000 INJECTION INTRAVENOUS; SUBCUTANEOUS at 06:16

## 2018-07-01 RX ADMIN — CEFTRIAXONE 100 GRAM(S): 500 INJECTION, POWDER, FOR SOLUTION INTRAMUSCULAR; INTRAVENOUS at 15:23

## 2018-07-01 RX ADMIN — AZITHROMYCIN 255 MILLIGRAM(S): 500 TABLET, FILM COATED ORAL at 18:50

## 2018-07-01 RX ADMIN — HEPARIN SODIUM 5000 UNIT(S): 5000 INJECTION INTRAVENOUS; SUBCUTANEOUS at 18:50

## 2018-07-01 RX ADMIN — Medication 325 MILLIGRAM(S): at 06:16

## 2018-07-01 NOTE — SWALLOW BEDSIDE ASSESSMENT ADULT - ORAL PHASE
Delayed oral transit time/Decreased anterior-posterior movement of the bolus Within functional limits

## 2018-07-01 NOTE — CONSULT NOTE ADULT - SUBJECTIVE AND OBJECTIVE BOX
HISTORY OF PRESENT ILLNESS:     This is an 85 years old female with a past medical history as below   presented initially to hosp after a mechanical fall of same. As per note. No trauma to the head. No LOC, lightheadedness or dizziness. . Cardiology team consulted for detectable troponin on lab test. Patient denies  chest pain, SOB, palpitations. EKG showed sinus rhythm with bigeminy no acute ischemic changes. Lab test was noted for positive U/A. CT angio chest was significant for Left lower lobe nodular opacity in the region of branching bronchi, may represent mucus impaction.   Patient is hemodynamically stable.      PAST MEDICAL & SURGICAL HISTORY:  Diastolic dysfunction  HTN (hypertension)  History of cholecystectomy  H/O bilateral hip replacements: Right Hip ORIF on Xray    FAMILY HISTORY:    Allergies    No Known Allergies      	  Home Medications:  atorvastatin:  (30 Jun 2018 13:29)  enalapril:  (30 Jun 2018 13:29)  Iron 100 Plus:  (30 Jun 2018 13:29)    MEDICATIONS  (STANDING):  atorvastatin 10 milliGRAM(s) Oral at bedtime  azithromycin  IVPB 500 milliGRAM(s) IV Intermittent every 24 hours  azithromycin  IVPB      cefTRIAXone   IVPB 2 Gram(s) IV Intermittent every 24 hours  enalapril 10 milliGRAM(s) Oral daily  ferrous    sulfate 325 milliGRAM(s) Oral every 12 hours  heparin  Injectable 5000 Unit(s) SubCutaneous every 12 hours    MEDICATIONS  (PRN):        PHYSICAL EXAM:  T(C): 36.4 (06-30-18 @ 22:14), Max: 38.7 (06-30-18 @ 13:15)  HR: 74 (06-30-18 @ 22:14) (74 - 106)  BP: 156/75 (06-30-18 @ 22:14) (113/59 - 156/75)  RR: 18 (06-30-18 @ 22:14) (18 - 18)  SpO2: 99% (06-30-18 @ 22:14) (96% - 100%)      Daily Height in cm: 157.48 (30 Jun 2018 13:23)        General Appearance: Normal	  Cardiovascular: Normal S1 S2,   Respiratory: decrease bilateral airways entries  Psychiatry: A & O somnolent by time of exam   Gastrointestinal:  Soft, Non-tender  Extremities: no LINDSAY        LABS:	 	                        13.0   11.27 )-----------( 197      ( 30 Jun 2018 13:00 )             38.4     06-30    142  |  104  |  30<H>  ----------------------------<  113<H>  4.8   |  26  |  1.1    Ca    10.3<H>      30 Jun 2018 13:00  Mg     1.9     06-30    TPro  6.3  /  Alb  3.8  /  TBili  0.3  /  DBili  <0.2  /  AST  17  /  ALT  11  /  AlkPhos  74  06-30      proBNP: Serum Pro-Brain Natriuretic Peptide: 5744 pg/mL (06-30 @ 13:00)      CARDIAC MARKERS:  Troponin T, Serum: 0.06 ng/mL (07-01 @ 00:46)  Troponin T, Serum: 0.06 ng/mL (06-30 @ 13:00)          ECG: < from: 12 Lead ECG (06.30.18 @ 13:17) >  Sinus rhythm with frequent Premature ventricular complexes in a pattern of  bigeminy  Possible Left atrial enlargement    < end of copied text >   	  RADIOLOGY:  < from: CT Angio Chest w/ IV Cont (06.30.18 @ 15:53) >  IMPRESSION:     No central or lobar pulmonary embolism.    Left lower lobe nodular opacity in the region of branching bronchi, may   represent mucus impaction. Bronchoscopy is recommended.    Enlarged main pulmonary artery measuring 3.4 cm.     < end of copied text >  	    PREVIOUS DIAGNOSTIC TESTING:    [ ] Echocardiogram: 9/2017  Summary   Left ventricle: Systolic function was normal. Ejection fraction was estimated   in the range of 55 % to 65 %. There were no regional wall motion   abnormalities. Wall thickness was moderately increased. Doppler parameters   were consistent with abnormal left ventricular relaxation (grade 1 diastolic   dysfunction).   Mitral valve: There was mild regurgitation.   Left atrium: The atrium was mildly dilated.   Pulmonic valve: There was mild to moderate regurgitation.   Pulmonary arteries: Systolic pressure was moderately increased. Estimated peak   pressure was in the range of 50 mmHg to 60 mmHg.   Tricuspid valve: There was moderate regurgitation.

## 2018-07-01 NOTE — CONSULT NOTE ADULT - ASSESSMENT
-mechanical fall no head trauma, no syncope  -detectable troponin in setting of UTI, ? obstructive pneumonia. likely demand ischemia stress related    Plan:  tele 24 hrs if negative dc tele  trend CE  2decho if no significant finding no need for further cardiac workup for now  continue statin ACE  Consider pulmonary evaluation  treat underlying UTI as by primary team. -mechanical fall no head trauma, no syncope  -detectable troponin in setting of UTI, ? obstructive pneumonia. likely demand ischemia stress related    Plan:  tele 24 hrs if negative dc tele  2decho if no significant finding no need for further cardiac workup for now  continue statin ACE  Consider pulmonary evaluation  treat underlying UTI as by primary team.

## 2018-07-01 NOTE — SWALLOW BEDSIDE ASSESSMENT ADULT - COMMENTS
Dysphagia evaluation conducted bedside. Pt. was alert, responsive, Ox4. Pt. was with nasal inner cannula. No signs of discomfort or pain noted at the time. Functional oropharyngeal swallow

## 2018-07-01 NOTE — SWALLOW BEDSIDE ASSESSMENT ADULT - SWALLOW EVAL: RECOMMENDED FEEDING/EATING TECHNIQUES
alternate food with liquid/position upright (90 degrees)/maintain upright posture during/after eating for 30 mins

## 2018-07-02 ENCOUNTER — TRANSCRIPTION ENCOUNTER (OUTPATIENT)
Age: 83
End: 2018-07-02

## 2018-07-02 VITALS — WEIGHT: 112.66 LBS

## 2018-07-02 LAB
-  AMIKACIN: SIGNIFICANT CHANGE UP
-  AMIKACIN: SIGNIFICANT CHANGE UP
-  AMOXICILLIN/CLAVULANIC ACID: SIGNIFICANT CHANGE UP
-  AMOXICILLIN/CLAVULANIC ACID: SIGNIFICANT CHANGE UP
-  AMPICILLIN/SULBACTAM: SIGNIFICANT CHANGE UP
-  AMPICILLIN/SULBACTAM: SIGNIFICANT CHANGE UP
-  AMPICILLIN: SIGNIFICANT CHANGE UP
-  AMPICILLIN: SIGNIFICANT CHANGE UP
-  AZTREONAM: SIGNIFICANT CHANGE UP
-  AZTREONAM: SIGNIFICANT CHANGE UP
-  CEFAZOLIN: SIGNIFICANT CHANGE UP
-  CEFAZOLIN: SIGNIFICANT CHANGE UP
-  CEFEPIME: SIGNIFICANT CHANGE UP
-  CEFEPIME: SIGNIFICANT CHANGE UP
-  CEFOXITIN: SIGNIFICANT CHANGE UP
-  CEFOXITIN: SIGNIFICANT CHANGE UP
-  CEFTRIAXONE: SIGNIFICANT CHANGE UP
-  CEFTRIAXONE: SIGNIFICANT CHANGE UP
-  CIPROFLOXACIN: SIGNIFICANT CHANGE UP
-  CIPROFLOXACIN: SIGNIFICANT CHANGE UP
-  ERTAPENEM: SIGNIFICANT CHANGE UP
-  ERTAPENEM: SIGNIFICANT CHANGE UP
-  GENTAMICIN: SIGNIFICANT CHANGE UP
-  GENTAMICIN: SIGNIFICANT CHANGE UP
-  IMIPENEM: SIGNIFICANT CHANGE UP
-  IMIPENEM: SIGNIFICANT CHANGE UP
-  LEVOFLOXACIN: SIGNIFICANT CHANGE UP
-  LEVOFLOXACIN: SIGNIFICANT CHANGE UP
-  MEROPENEM: SIGNIFICANT CHANGE UP
-  MEROPENEM: SIGNIFICANT CHANGE UP
-  NITROFURANTOIN: SIGNIFICANT CHANGE UP
-  NITROFURANTOIN: SIGNIFICANT CHANGE UP
-  PIPERACILLIN/TAZOBACTAM: SIGNIFICANT CHANGE UP
-  PIPERACILLIN/TAZOBACTAM: SIGNIFICANT CHANGE UP
-  TIGECYCLINE: SIGNIFICANT CHANGE UP
-  TIGECYCLINE: SIGNIFICANT CHANGE UP
-  TOBRAMYCIN: SIGNIFICANT CHANGE UP
-  TOBRAMYCIN: SIGNIFICANT CHANGE UP
-  TRIMETHOPRIM/SULFAMETHOXAZOLE: SIGNIFICANT CHANGE UP
-  TRIMETHOPRIM/SULFAMETHOXAZOLE: SIGNIFICANT CHANGE UP
ALBUMIN SERPL ELPH-MCNC: 3.5 G/DL — SIGNIFICANT CHANGE UP (ref 3.5–5.2)
ALP SERPL-CCNC: 72 U/L — SIGNIFICANT CHANGE UP (ref 30–115)
ALT FLD-CCNC: 12 U/L — SIGNIFICANT CHANGE UP (ref 0–41)
ANION GAP SERPL CALC-SCNC: 10 MMOL/L — SIGNIFICANT CHANGE UP (ref 7–14)
AST SERPL-CCNC: 24 U/L — SIGNIFICANT CHANGE UP (ref 0–41)
BASOPHILS # BLD AUTO: 0.01 K/UL — SIGNIFICANT CHANGE UP (ref 0–0.2)
BASOPHILS NFR BLD AUTO: 0.2 % — SIGNIFICANT CHANGE UP (ref 0–1)
BILIRUB SERPL-MCNC: 0.3 MG/DL — SIGNIFICANT CHANGE UP (ref 0.2–1.2)
BUN SERPL-MCNC: 18 MG/DL — SIGNIFICANT CHANGE UP (ref 10–20)
CALCIUM SERPL-MCNC: 9.5 MG/DL — SIGNIFICANT CHANGE UP (ref 8.5–10.1)
CHLORIDE SERPL-SCNC: 105 MMOL/L — SIGNIFICANT CHANGE UP (ref 98–110)
CK MB CFR SERPL CALC: 4.6 NG/ML — SIGNIFICANT CHANGE UP (ref 0.6–6.3)
CK SERPL-CCNC: 506 U/L — HIGH (ref 0–225)
CO2 SERPL-SCNC: 28 MMOL/L — SIGNIFICANT CHANGE UP (ref 17–32)
CREAT SERPL-MCNC: 0.7 MG/DL — SIGNIFICANT CHANGE UP (ref 0.7–1.5)
CULTURE RESULTS: SIGNIFICANT CHANGE UP
EOSINOPHIL # BLD AUTO: 0.08 K/UL — SIGNIFICANT CHANGE UP (ref 0–0.7)
EOSINOPHIL NFR BLD AUTO: 1.3 % — SIGNIFICANT CHANGE UP (ref 0–8)
GLUCOSE SERPL-MCNC: 93 MG/DL — SIGNIFICANT CHANGE UP (ref 70–99)
HCT VFR BLD CALC: 35.7 % — LOW (ref 37–47)
HGB BLD-MCNC: 11.8 G/DL — LOW (ref 12–16)
IMM GRANULOCYTES NFR BLD AUTO: 0.3 % — SIGNIFICANT CHANGE UP (ref 0.1–0.3)
LYMPHOCYTES # BLD AUTO: 1.3 K/UL — SIGNIFICANT CHANGE UP (ref 1.2–3.4)
LYMPHOCYTES # BLD AUTO: 20.3 % — LOW (ref 20.5–51.1)
MAGNESIUM SERPL-MCNC: 1.8 MG/DL — SIGNIFICANT CHANGE UP (ref 1.8–2.4)
MCHC RBC-ENTMCNC: 30.3 PG — SIGNIFICANT CHANGE UP (ref 27–31)
MCHC RBC-ENTMCNC: 33.1 G/DL — SIGNIFICANT CHANGE UP (ref 32–37)
MCV RBC AUTO: 91.5 FL — SIGNIFICANT CHANGE UP (ref 81–99)
METHOD TYPE: SIGNIFICANT CHANGE UP
METHOD TYPE: SIGNIFICANT CHANGE UP
MONOCYTES # BLD AUTO: 0.33 K/UL — SIGNIFICANT CHANGE UP (ref 0.1–0.6)
MONOCYTES NFR BLD AUTO: 5.2 % — SIGNIFICANT CHANGE UP (ref 1.7–9.3)
NEUTROPHILS # BLD AUTO: 4.66 K/UL — SIGNIFICANT CHANGE UP (ref 1.4–6.5)
NEUTROPHILS NFR BLD AUTO: 72.7 % — SIGNIFICANT CHANGE UP (ref 42.2–75.2)
NRBC # BLD: 0 /100 WBCS — SIGNIFICANT CHANGE UP (ref 0–0)
ORGANISM # SPEC MICROSCOPIC CNT: SIGNIFICANT CHANGE UP
PLATELET # BLD AUTO: 151 K/UL — SIGNIFICANT CHANGE UP (ref 130–400)
POTASSIUM SERPL-MCNC: 4.4 MMOL/L — SIGNIFICANT CHANGE UP (ref 3.5–5)
POTASSIUM SERPL-SCNC: 4.4 MMOL/L — SIGNIFICANT CHANGE UP (ref 3.5–5)
PROT SERPL-MCNC: 5.6 G/DL — LOW (ref 6–8)
RBC # BLD: 3.9 M/UL — LOW (ref 4.2–5.4)
RBC # FLD: 13.2 % — SIGNIFICANT CHANGE UP (ref 11.5–14.5)
SODIUM SERPL-SCNC: 143 MMOL/L — SIGNIFICANT CHANGE UP (ref 135–146)
SPECIMEN SOURCE: SIGNIFICANT CHANGE UP
TROPONIN T SERPL-MCNC: 0.03 NG/ML — CRITICAL HIGH
WBC # BLD: 6.4 K/UL — SIGNIFICANT CHANGE UP (ref 4.8–10.8)
WBC # FLD AUTO: 6.4 K/UL — SIGNIFICANT CHANGE UP (ref 4.8–10.8)

## 2018-07-02 RX ORDER — METOPROLOL TARTRATE 50 MG
25 TABLET ORAL
Qty: 0 | Refills: 0 | Status: DISCONTINUED | OUTPATIENT
Start: 2018-07-02 | End: 2018-07-02

## 2018-07-02 RX ORDER — METOPROLOL TARTRATE 50 MG
1 TABLET ORAL
Qty: 60 | Refills: 2
Start: 2018-07-02 | End: 2018-09-29

## 2018-07-02 RX ORDER — METOPROLOL TARTRATE 50 MG
1 TABLET ORAL
Qty: 0 | Refills: 0 | COMMUNITY
Start: 2018-07-02

## 2018-07-02 RX ADMIN — Medication 25 MILLIGRAM(S): at 10:52

## 2018-07-02 RX ADMIN — HEPARIN SODIUM 5000 UNIT(S): 5000 INJECTION INTRAVENOUS; SUBCUTANEOUS at 06:19

## 2018-07-02 RX ADMIN — Medication 325 MILLIGRAM(S): at 06:19

## 2018-07-02 RX ADMIN — Medication 10 MILLIGRAM(S): at 06:18

## 2018-07-02 NOTE — DISCHARGE NOTE ADULT - PATIENT PORTAL LINK FT
You can access the [facility] Patient Portal, offered by [facility], by registering with the following website: [email or web address]

## 2018-07-02 NOTE — CONSULT NOTE ADULT - SUBJECTIVE AND OBJECTIVE BOX
Patient is a 85y old  Female who presents with a chief complaint of mechanical fall, UTI, mucous plug (2018 21:24)      HPI:  85 year old female with a past medical history of HTN and Grade 1 Diastolic Dysfunction presenting for mechanical fall of same day duration. According to the patient's daughter, the patient and the aid [facility] where the patient fell on her buttocks and right elbow. No trauma to the head. No LOC, lightheadedness, dizziness, headache, focal neurological deficits, seizure like activity, post ictal state, chest pain, SOB, palpitations. Patient's daughter reports no change throughout the day from patient's baseline status; no change in PO intake, no fevers, no chills, no increased fatigue or weakness from baseline, no change in mental status, no slurry speech, no nausea, no vomiting, no diarrhea, no hematochezia, no melena, no dysuria, no increased frequency, no foul smelling urine, no lower extremity swelling increased from baseline, no lower extremity pain. No recent travels, no recent sick contact.  Patient lives in daughter's basement, uses a walker, does ADLs. (2018 21:24)      PAST MEDICAL & SURGICAL HISTORY:  Diastolic dysfunction  HTN (hypertension)  History of cholecystectomy  H/O bilateral hip replacements: Right Hip ORIF on Xray      REVIEW OF SYSTEMS:    CONSTITUTIONAL: No weakness, fevers or chills  EYES/ENT: No visual changes;  No vertigo or throat pain   NECK: No pain or stiffness  RESPIRATORY:   CARDIOVASCULAR: No chest pain or palpitations  GASTROINTESTINAL: No abdominal or epigastric pain. No nausea, vomiting, or hematemesis; No diarrhea or constipation. No melena or hematochezia.  GENITOURINARY: No dysuria, frequency or hematuria  NEUROLOGICAL: No numbness or weakness  SKIN: No itching, rashes      MEDICATIONS  (STANDING):  atorvastatin 10 milliGRAM(s) Oral at bedtime  enalapril 10 milliGRAM(s) Oral daily  ferrous    sulfate 325 milliGRAM(s) Oral every 12 hours  heparin  Injectable 5000 Unit(s) SubCutaneous every 12 hours  metoprolol tartrate 25 milliGRAM(s) Oral two times a day    MEDICATIONS  (PRN):      Vital Signs Last 24 Hrs  T(C): 36.6 (2018 21:25), Max: 36.6 (2018 13:25)  T(F): 97.8 (2018 21:25), Max: 97.8 (2018 13:25)  HR: 73 (2018 09:39) (72 - 81)  BP: 170/95 (2018 09:39) (119/57 - 179/74)  BP(mean): --  RR: 18 (2018 09:39) (16 - 18)  SpO2: 94% (2018 06:48) (94% - 94%)  General: WN/WD NAD  Neurology: A&Ox3, nonfocal, PACHECO x 4  Respiratory:  CV: RRR, S1S2, no murmurs, rubs or gallops  Abdominal: Soft, NT, ND +BS, Last BM  Extremities: No edema, + peripheral pulses        07-02    143  |  105  |  18  ----------------------------<  93  4.4   |  28  |  0.7    Ca    9.5      2018 08:30  Mg     1.8     07-02    TPro  5.6<L>  /  Alb  3.5  /  TBili  0.3  /  DBili  x   /  AST  24  /  ALT  12  /  AlkPhos  72  07-02  CARDIAC MARKERS ( 2018 09:52 )  x     / 0.07 ng/mL / 921 U/L / x     / 7.4 ng/mL  CARDIAC MARKERS ( 2018 00:46 )  x     / 0.06 ng/mL / 1105 U/L / x     / 7.9 ng/mL  CARDIAC MARKERS ( 2018 13:00 )  x     / 0.06 ng/mL / 61 U/L / x     / 4.9 ng/mL                            11.8   6.40  )-----------( 151      ( 2018 08:30 )             35.7   PT/INR - ( 2018 13:00 )   PT: 11.80 sec;   INR: 1.09 ratio         PTT - ( 2018 13:00 )  PTT:33.9 sec  Urinalysis Basic - ( 2018 13:00 )    Color: Yellow / Appearance: Cloudy / S.015 / pH: x  Gluc: x / Ketone: Negative  / Bili: Negative / Urobili: 0.2 mg/dL   Blood: x / Protein: Trace mg/dL / Nitrite: Positive   Leuk Esterase: Negative / RBC: x / WBC x   Sq Epi: x / Non Sq Epi: x / Bacteria: Moderate /HPF        Culture - Blood (collected 2018 13:01)  Source: .Blood Blood  Preliminary Report (2018 18:01):    No growth to date.    Culture - Blood (collected 2018 13:01)  Source: .Blood Blood  Preliminary Report (2018 18:01):    No growth to date.    Culture - Urine (collected 2018 13:00)  Source: .Urine Catheterized  Preliminary Report (2018 20:38):    >100,000 CFU/ml Klebsiella pneumoniae Patient is a 85y old  Female who presents with a chief complaint of mechanical fall, UTI, mucous plug (2018 21:24)    Pulmonology consulted: Mucous plug on CT scan:    HPI:  85 year old female with a past medical history of HTN and Grade 1 Diastolic Dysfunction presenting for mechanical fall on the day of presentation. Per daughter, the patient fell on her buttocks and right elbow while taking a bus to her senior home. At the time there was no trauma to the head, LOC, lightheadedness, dizziness, headache, focal neurological deficits, seizure like activity, post ictal state, chest pain, SOB, palpitations, fevers chills, change in PO intake, and no change in baseline status or mental status. Patient also denied slurry speech, nausea, vomiting, diarrhea, hematochezia, melena, dysuria, increased frequency, foul smelling urine, lower extremity swelling increased from baseline, lower extremity pain, recent travels, or recent sick contact.    Patient lives in daughter's basement, uses a walker, does ADLs. (2018 21:24)    18: Patient seen and examined at bedside. Patient denies cough, fatigue, dizziness, chest pain, leg edema, runny nose and dyspnea at rest but reports dyspnea on exertion - patient can walk up 5 stairs before becoming SOB, relieved by rest. Otherwise patient can walk around her house without dyspnea. Patient denies being diagnosed with pulmonary disease, denies history of pulmonary hypertension, and is unclear when last visit with PCP was. Patient has a 5 year history of smoking (1-5 cigs/day).    PAST MEDICAL & SURGICAL HISTORY:  Diastolic dysfunction  HTN (hypertension)  History of cholecystectomy  H/O bilateral hip replacements: Right Hip ORIF on Xray      REVIEW OF SYSTEMS:    CONSTITUTIONAL: Denies weakness, dizziness, fevers or chills  EYES/ENT: No visual changes;  Denies vertigo or throat pain   NECK: Denies pain or stiffness  RESPIRATORY: See HPI: Denies cough, no SOB.  CARDIOVASCULAR: Denies chest pain, palpitations, leg edema  GASTROINTESTINAL: Denies abdominal or epigastric pain. No nausea, vomiting, or hematemesis; No diarrhea or constipation. No melena or hematochezia.  GENITOURINARY: Denies dysuria, frequency or hematuria  NEUROLOGICAL: Denies numbness or weakness  SKIN: Denies itching, rashes      MEDICATIONS  (STANDING):  atorvastatin 10 milliGRAM(s) Oral at bedtime  enalapril 10 milliGRAM(s) Oral daily  ferrous    sulfate 325 milliGRAM(s) Oral every 12 hours  heparin  Injectable 5000 Unit(s) SubCutaneous every 12 hours  metoprolol tartrate 25 milliGRAM(s) Oral two times a day    SOCIAL HISTORY:  1-5 cigs/day X 5 years. Denies alcohol and illicit drug use.     Vital Signs Last 24 Hrs  T(C): 36.6 (2018 21:25), Max: 36.6 (2018 13:25)  T(F): 97.8 (2018 21:25), Max: 97.8 (2018 13:25)  HR: 73 (2018 09:39) (72 - 81)  BP: 170/95 (2018 09:39) (119/57 - 179/74)  BP(mean): --  RR: 18 (2018 09:39) (16 - 18)  SpO2: 94% (2018 06:48) (94% - 94%)  General: WN/WD NAD. Sitting comfortably in bedside chair  Neurology: A&Ox3,   Respiratory: No increased work of breathing. Symmetrical rise and fall of chest. Fair air entry bilaterally. Lungs clear to auscultation bilaterally. No wheezes, rhonchi, crackles.   CV: RRR, S1S2, Grade 2 systolic murmur loudest in mitral region. No rubs or gallops  Abdominal: Soft, NT, ND +BS  Extremities: No edema, + peripheral pulses.  Musculoskeletal: 5/5 strength in upper and lower extremities.            143  |  105  |  18  ----------------------------<  93  4.4   |  28  |  0.7    Ca    9.5      2018 08:30  Mg     1.8         TPro  5.6<L>  /  Alb  3.5  /  TBili  0.3  /  DBili  x   /  AST  24  /  ALT  12  /  AlkPhos  72  07-  CARDIAC MARKERS ( 2018 09:52 )  x     / 0.07 ng/mL / 921 U/L / x     / 7.4 ng/mL  CARDIAC MARKERS ( 2018 00:46 )  x     / 0.06 ng/mL / 1105 U/L / x     / 7.9 ng/mL  CARDIAC MARKERS ( 2018 13:00 )  x     / 0.06 ng/mL / 61 U/L / x     / 4.9 ng/mL                            11.8   6.40  )-----------( 151      ( 2018 08:30 )             35.7   PT/INR - ( 2018 13:00 )   PT: 11.80 sec;   INR: 1.09 ratio         PTT - ( 2018 13:00 )  PTT:33.9 sec  Urinalysis Basic - ( 2018 13:00 )    Color: Yellow / Appearance: Cloudy / S.015 / pH: x  Gluc: x / Ketone: Negative  / Bili: Negative / Urobili: 0.2 mg/dL   Blood: x / Protein: Trace mg/dL / Nitrite: Positive   Leuk Esterase: Negative / RBC: x / WBC x   Sq Epi: x / Non Sq Epi: x / Bacteria: Moderate /HPF        Culture - Blood (collected 2018 13:01)  Source: .Blood Blood  Preliminary Report (2018 18:01):    No growth to date.    Culture - Blood (collected 2018 13:01)  Source: .Blood Blood  Preliminary Report (2018 18:01):    No growth to date.    Culture - Urine (collected 2018 13:00)  Source: .Urine Catheterized  Preliminary Report (2018 20:38):    >100,000 CFU/ml Klebsiella pneumoniae Patient is a 85y old  Female who presents with a chief complaint of mechanical fall (2018 21:24)    Pulmonology consulted: Mucous plug on CT scan:    HPI:  85 year old female with a past medical history of HTN and Grade 1 Diastolic Dysfunction presenting for mechanical fall on the day of presentation. Per daughter, the patient fell on her buttocks and right elbow while taking a bus to her senior home. At the time there was no trauma to the head, LOC, lightheadedness, dizziness, headache, focal neurological deficits, seizure like activity, post ictal state, chest pain, SOB, palpitations, fevers chills, change in PO intake, and no change in baseline status or mental status. Patient also denied slurry speech, nausea, vomiting, diarrhea, hematochezia, melena, dysuria, increased frequency, foul smelling urine, lower extremity swelling increased from baseline, lower extremity pain, recent travels, or recent sick contact.    Patient lives in daughter's basement, uses a walker, does ADLs. (2018 21:24)    18: Patient seen and examined at bedside. Patient denies cough, fatigue, dizziness, chest pain, leg edema, runny nose and dyspnea at rest but reports dyspnea on exertion - patient can walk up 5 stairs before becoming SOB, relieved by rest. Otherwise patient can walk around her house without dyspnea. Patient denies being diagnosed with pulmonary disease, denies history of pulmonary hypertension, and is unclear when last visit with PCP was. Patient has a 5 year history of smoking (1-5 cigs/day).    PAST MEDICAL & SURGICAL HISTORY:  Diastolic dysfunction  HTN (hypertension)  History of cholecystectomy  H/O bilateral hip replacements: Right Hip ORIF on Xray      REVIEW OF SYSTEMS:    CONSTITUTIONAL: Denies weakness, dizziness, fevers or chills  EYES/ENT: No visual changes;  Denies vertigo or throat pain   NECK: Denies pain or stiffness  RESPIRATORY: See HPI: Denies cough, no SOB.  CARDIOVASCULAR: Denies chest pain, palpitations, leg edema  GASTROINTESTINAL: Denies abdominal or epigastric pain. No nausea, vomiting, or hematemesis; No diarrhea or constipation. No melena or hematochezia.  GENITOURINARY: Denies dysuria, frequency or hematuria  NEUROLOGICAL: Denies numbness or weakness  SKIN: Denies itching, rashes      MEDICATIONS  (STANDING):  atorvastatin 10 milliGRAM(s) Oral at bedtime  enalapril 10 milliGRAM(s) Oral daily  ferrous    sulfate 325 milliGRAM(s) Oral every 12 hours  heparin  Injectable 5000 Unit(s) SubCutaneous every 12 hours  metoprolol tartrate 25 milliGRAM(s) Oral two times a day    SOCIAL HISTORY:  1-5 cigs/day X 5 years. Denies alcohol and illicit drug use.     Vital Signs Last 24 Hrs  T(C): 36.6 (2018 21:25), Max: 36.6 (2018 13:25)  T(F): 97.8 (2018 21:25), Max: 97.8 (2018 13:25)  HR: 73 (2018 09:39) (72 - 81)  BP: 170/95 (2018 09:39) (119/57 - 179/74)  BP(mean): --  RR: 18 (2018 09:39) (16 - 18)  SpO2: 94% (2018 06:48) (94% - 94%)  General: WN/WD NAD. Sitting comfortably in bedside chair  Neurology: A&Ox3,   Respiratory: No increased work of breathing. Symmetrical rise and fall of chest. Fair air entry bilaterally. Lungs clear to auscultation bilaterally. No wheezes, rhonchi, crackles.   CV: RRR, S1S2, Grade 2 systolic murmur loudest in mitral region. No rubs or gallops  Abdominal: Soft, NT, ND +BS  Extremities: No edema, + peripheral pulses.  Musculoskeletal: 5/5 strength in upper and lower extremities.            143  |  105  |  18  ----------------------------<  93  4.4   |  28  |  0.7    Ca    9.5      2018 08:30  Mg     1.8     07-    TPro  5.6<L>  /  Alb  3.5  /  TBili  0.3  /  DBili  x   /  AST  24  /  ALT  12  /  AlkPhos  72  07-02  CARDIAC MARKERS ( 2018 09:52 )  x     / 0.07 ng/mL / 921 U/L / x     / 7.4 ng/mL  CARDIAC MARKERS ( 2018 00:46 )  x     / 0.06 ng/mL / 1105 U/L / x     / 7.9 ng/mL  CARDIAC MARKERS ( 2018 13:00 )  x     / 0.06 ng/mL / 61 U/L / x     / 4.9 ng/mL                            11.8   6.40  )-----------( 151      ( 2018 08:30 )             35.7   PT/INR - ( 2018 13:00 )   PT: 11.80 sec;   INR: 1.09 ratio         PTT - ( 2018 13:00 )  PTT:33.9 sec  Urinalysis Basic - ( 2018 13:00 )    Color: Yellow / Appearance: Cloudy / S.015 / pH: x  Gluc: x / Ketone: Negative  / Bili: Negative / Urobili: 0.2 mg/dL   Blood: x / Protein: Trace mg/dL / Nitrite: Positive   Leuk Esterase: Negative / RBC: x / WBC x   Sq Epi: x / Non Sq Epi: x / Bacteria: Moderate /HPF        Culture - Blood (collected 2018 13:01)  Source: .Blood Blood  Preliminary Report (2018 18:01):    No growth to date.    Culture - Blood (collected 2018 13:01)  Source: .Blood Blood  Preliminary Report (2018 18:01):    No growth to date.    Culture - Urine (collected 2018 13:00)  Source: .Urine Catheterized  Preliminary Report (2018 20:38):    >100,000 CFU/ml Klebsiella pneumoniae

## 2018-07-02 NOTE — CONSULT NOTE ADULT - SUBJECTIVE AND OBJECTIVE BOX
HPI:  85 year old female with a past medical history of HTN and Grade 1 Diastolic Dysfunction presenting for mechanical fall of same day duration. According to the patient's daughter, the patient and the aid [facility] the patient [facility] on her buttocks and right elbow. No trauma to the head. No LOC, lightheadedness, dizziness, headache, focal neurological deficits, seizure like activity, post ictal state, chest pain, SOB, palpitations. Patient's daughter reports no change throughout the day from patient's baseline status; no change in PO intake, no fevers, no chills, no increased fatigue or weakness from baseline, no change in mental status, no slurry speech, no nausea, no vomiting, no diarrhea, no hematochezia, no melena, no dysuria, no increased frequency, no foul smelling urine, no lower extremity swelling increased from baseline, no lower extremity pain. No recent travels, no recent sick contact.  Patient lives in daughter's basement, uses a walker, does ADLs. (2018 21:24)      PAST MEDICAL & SURGICAL HISTORY:  Diastolic dysfunction  HTN (hypertension)  History of cholecystectomy  H/O bilateral hip replacements: Right Hip ORIF on Xray      Hospital Course:    TODAY'S SUBJECTIVE & REVIEW OF SYMPTOMS:     Constitutional WNL   Cardio WNL   Resp WNL   GI WNL  Heme WNL  Endo WNL  Skin WNL  MSK WNL  Neuro WNL  Cognitive WNL  Psych WNL      MEDICATIONS  (STANDING):  atorvastatin 10 milliGRAM(s) Oral at bedtime  enalapril 10 milliGRAM(s) Oral daily  ferrous    sulfate 325 milliGRAM(s) Oral every 12 hours  heparin  Injectable 5000 Unit(s) SubCutaneous every 12 hours  metoprolol tartrate 25 milliGRAM(s) Oral two times a day    MEDICATIONS  (PRN):      FAMILY HISTORY:      Allergies    No Known Allergies    Intolerances        SOCIAL HISTORY:    [  ] Etoh  [  ] Smoking  [  ] Substance abuse     Home Environment:  [  ] Home Alone  [ x ] Lives with Family  [  ] Home Health Aid    Dwelling:  [  ] Apartment  [ x ] Private House  [  ] Adult Home  [  ] Skilled Nursing Facility      [  ] Short Term  [  ] Long Term  [x  ] Stairs       Elevator [  ]    FUNCTIONAL STATUS PTA: (Check all that apply)  Ambulation: [   ]Independent    [  ] Dependent     [  ] Non-Ambulatory  Assistive Device: [  ] SA Cane  [  ]  Q Cane  [ x ] Walker  [  ]  Wheelchair  ADL : [  ] Independent  [ x ]  Dependent       Vital Signs Last 24 Hrs  T(C): 36.6 (2018 21:25), Max: 36.6 (2018 13:25)  T(F): 97.8 (2018 21:25), Max: 97.8 (2018 13:25)  HR: 73 (2018 09:39) (72 - 81)  BP: 170/95 (2018 09:39) (119/57 - 179/74)  BP(mean): --  RR: 18 (2018 09:39) (16 - 18)  SpO2: 94% (2018 06:48) (94% - 94%)      PHYSICAL EXAM: Alert & Oriented X3  GENERAL: NAD, well-groomed, well-developed  HEAD:  Atraumatic, Normocephalic  CHEST/LUNG: Clear  HEART: Regular  ABDOMEN: Soft, Nontender  EXTREMITIES:  no calf tenderness    NERVOUS SYSTEM:  Cranial Nerves 2-12 intact [  ] Abnormal  [  ]  ROM: WFL all extremities [ x ]  Abnormal [  ]  Motor Strength: WFL all extremities  [  ]  Abnormal [ x ]4/5 all ext  Sensation: intact to light touch [  ] Abnormal [  ]  Reflexes: Symmetric [  ]  Abnormal [  ]    FUNCTIONAL STATUS:  Bed Mobility: Independent [  ]  Supervision [  ]  Needs Assistance [x  ]  N/A [  ]  Transfers: Independent [  ]  Supervision [  ]  Needs Assistance [x  ]  N/A [  ]   Ambulation: Independent [  ]  Supervision [  ]  Needs Assistance [  ]  N/A [  ]  ADL: Independent [  ] Requires Assistance [  ] N/A [  ]      LABS:                        11.8   6.40  )-----------( 151      ( 2018 08:30 )             35.7     07-02    143  |  105  |  18  ----------------------------<  93  4.4   |  28  |  0.7    Ca    9.5      2018 08:30  Mg     1.8     07-02    TPro  5.6<L>  /  Alb  3.5  /  TBili  0.3  /  DBili  x   /  AST  24  /  ALT  12  /  AlkPhos  72  07-02    PT/INR - ( 2018 13:00 )   PT: 11.80 sec;   INR: 1.09 ratio         PTT - ( 2018 13:00 )  PTT:33.9 sec  Urinalysis Basic - ( 2018 13:00 )    Color: Yellow / Appearance: Cloudy / S.015 / pH: x  Gluc: x / Ketone: Negative  / Bili: Negative / Urobili: 0.2 mg/dL   Blood: x / Protein: Trace mg/dL / Nitrite: Positive   Leuk Esterase: Negative / RBC: x / WBC x   Sq Epi: x / Non Sq Epi: x / Bacteria: Moderate /HPF        RADIOLOGY & ADDITIONAL STUDIES:    Assesment:

## 2018-07-02 NOTE — DISCHARGE NOTE ADULT - CARE PROVIDERS DIRECT ADDRESSES
,DirectAddress_Unknown,DirectAddress_Unknown,[email or web address]

## 2018-07-02 NOTE — DISCHARGE NOTE ADULT - HOSPITAL COURSE
85 year old female with a past medical history of HTN and Grade 1 Diastolic Dysfunction presenting for mechanical fall of same day duration. According to the patient's daughter, the patient and the aid took [facility] bus [facility] where the patient fell on her buttocks and right elbow. No trauma to the head. No LOC, lightheadedness, dizziness, headache, focal neurological deficits, seizure like activity, post ictal state, chest pain, SOB, palpitations. Patient's daughter reports no change throughout the day from patient's baseline status; no change in PO intake, no fevers, no chills, no increased fatigue or weakness from baseline, no change in mental status, no slurry speech, no nausea, no vomiting, no diarrhea, no hematochezia, no melena, no dysuria, no increased frequency, no foul smelling urine, no lower extremity swelling increased from baseline, no lower extremity pain. No recent travels, no recent sick contact.  Patient lives in daughter's basement, uses a walker, does ADLs.    In the ED, chest Xray , elbow and pelvis negative. Head CT and cervical CT were also negative. Chest CT shows negative for PE however shows left lower lobe bronchi mucous impaction. ECHO shows normal LV systolic function with an EF of 65-70%. Patient was also found to have asymptomatic bacteremia, which does not require any treatment. Patient has been evaluated by pulmonary and cardiology. Patient is stable to be discharged and is advised to follow up with her PCP, pulmonary and cardiology outpatient.

## 2018-07-02 NOTE — CONSULT NOTE ADULT - ASSESSMENT

## 2018-07-02 NOTE — CONSULT NOTE ADULT - ASSESSMENT
85 year old female with a past medical history of HTN and Grade 1 Diastolic Dysfunction presenting 06/30/18 for mechanical fall on the day of presentation. Pulmonology consulted for mucus plug on CT scan of chest.    #Mucus plug in lung - small  -Patient appears asymptomatic-denies cough, SOB, dyspnea  -Recommend incentive spirometry     #Severe pulmonary HTN  -Estimated pulmonary systolic pressure 60.7 assuming RA pressure 8 mmHg  -Likely grade 2 in setting of left sided valvular disease (mild to moderate mitral regurgitation) and moderate septal LV Hypertrophy  - dyspnea on exertion, but denies chest pain, dyspnea at rest, leg edema, palpitations or fatigue  - Fairly asymptomatic - Recommend outpatient Pulmonary follow up 85 year old female with a past medical history of HTN and Grade 1 Diastolic Dysfunction presenting 06/30/18 for mechanical fall on the day of presentation. Pulmonology consulted for mucus plug on CT scan of chest.    #Mucus plug in lung - small  -Patient appears asymptomatic-denies cough, SOB, dyspnea  -Recommend incentive spirometry     #Pulmonary HTN  -Estimated pulmonary systolic pressure 60.7 assuming RA pressure 8 mmHg  -Likely grade 2 in setting of left sided valvular disease (mild to moderate mitral regurgitation) and moderate septal LV Hypertrophy  - dyspnea on exertion, but denies chest pain, dyspnea at rest, leg edema, palpitations or fatigue  - Fairly asymptomatic - Recommend outpatient Pulmonary follow up 85 year old female with a past medical history of HTN and Grade 1 Diastolic Dysfunction presenting 06/30/18 for mechanical fall on the day of presentation. Pulmonology consulted for CT scan of chest FINDINGS, PATIENT FEELS WELL WANT TO GO HOME    - INCENTIVE SPIROMETRY  - ALBUTEROL AS NEEDED  - PUL STANDPOINT NO INTERVENTION  - OP F/UP

## 2018-07-02 NOTE — DISCHARGE NOTE ADULT - MEDICATION SUMMARY - MEDICATIONS TO TAKE
I will START or STAY ON the medications listed below when I get home from the hospital:    enalapril  -- Indication: For HTN (hypertension)    atorvastatin  -- Indication: For Dyslipidemia    metoprolol tartrate 25 mg oral tablet  -- 1 tab(s) by mouth 2 times a day  -- Indication: For HTN (hypertension)    Iron 100 Plus  -- Indication: For Supplement

## 2018-07-02 NOTE — PROGRESS NOTE ADULT - SUBJECTIVE AND OBJECTIVE BOX
YKRA MENDIOLA  85y Female    INTERVAL HPI/OVERNIGHT EVENTS:  Patient seen and examined.    ROS: All other systems are negative.    Vital Signs:    T(F): 97.8 (07-01-18 @ 21:25), Max: 97.8 (07-01-18 @ 13:25)  HR: 73 (07-02-18 @ 09:39) (72 - 81)  BP: 170/95 (07-02-18 @ 09:39) (119/57 - 179/74)  RR: 18 (07-02-18 @ 09:39) (16 - 18)  SpO2: 94% (07-02-18 @ 06:48) (94% - 94%)  I&O's Summary    01 Jul 2018 07:01  -  02 Jul 2018 07:00  --------------------------------------------------------  IN: 420 mL / OUT: 4 mL / NET: 416 mL      Daily Height in cm: 167.64 (01 Jul 2018 13:25)    Daily   CAPILLARY BLOOD GLUCOSE          PHYSICAL EXAM:    GENERAL:  NAD  SKIN: No rashes or lesions  HENT: Atrumatic. Normocephalic. PERRL. Moist membranes.  NECK: Supple, No JVD. No lymphadenopathy.  PULMONARY: CTA B/L. No wheezing. No rales  CVS: Normal S1, S2. Rate and Rythm are regular. No murmurs.  ABDOMEN/GI: Soft, Nontender, Nondistended; BS present  EXTREMITIES: Peripheral pulses intact. No edema B/L LE.  NEUROLOGIC:  No motor or sensory deficit.  PSYCH: Alert & oriented x 3    Consultant(s) Notes Reviewed:  [x ] YES  [ ] NO  Care Discussed with Consultants/Other Providers [ x] YES  [ ] NO    EKG reviewed  Telemetry reviewed    LABS:                        11.8   6.40  )-----------( 151      ( 02 Jul 2018 08:30 )             35.7     07-02    143  |  105  |  18  ----------------------------<  93  4.4   |  28  |  0.7    Ca    9.5      02 Jul 2018 08:30  Mg     1.8     07-02    TPro  5.6<L>  /  Alb  3.5  /  TBili  0.3  /  DBili  x   /  AST  24  /  ALT  12  /  AlkPhos  72  07-02    PT/INR - ( 30 Jun 2018 13:00 )   PT: 11.80 sec;   INR: 1.09 ratio         PTT - ( 30 Jun 2018 13:00 )  PTT:33.9 sec  Serum Pro-Brain Natriuretic Peptide: 5744 pg/mL (06-30-18 @ 13:00)    Trop 0.07, CKMB 7.4, , 07-01-18 @ 09:52  Trop 0.06, CKMB 7.9, CK 1105, 07-01-18 @ 00:46  Trop 0.06, CKMB 4.9, CK 61, 06-30-18 @ 13:00      Culture - Blood (collected 30 Jun 2018 13:01)  Source: .Blood Blood  Preliminary Report (01 Jul 2018 18:01):    No growth to date.    Culture - Blood (collected 30 Jun 2018 13:01)  Source: .Blood Blood  Preliminary Report (01 Jul 2018 18:01):    No growth to date.    Culture - Urine (collected 30 Jun 2018 13:00)  Source: .Urine Catheterized  Preliminary Report (01 Jul 2018 20:38):    >100,000 CFU/ml Klebsiella pneumoniae        RADIOLOGY & ADDITIONAL TESTS:      Imaging or report Personally Reviewed:  [ ] YES  [ ] NO  < from: Transthoracic Echocardiogram (07.01.18 @ 08:44) >    Summary:   1. Left ventricular ejection fraction, by visual estimation, is 65 to   70%.   2. Normal global left ventricular systolic function.   3. The left ventricular diastolic function could not be assessed in this   study.   4. Moderate septal left ventricular hypertrophy.   5. Mild-to-moderate mitral regurgitation.   6. Moderate mitral annular calcification.   7. Mild aortic stenosis.   8. Mild aortic regurgitation.   9. Moderate tricuspid regurgitation.  10. Estimated pulmonary artery systolic pressure is 60.7 mmHg assuming a   right atrial pressure of 8 mmHg, which is consistent with severe   pulmonary hypertension.  11. Mild-to-moderate pulmonic regurgitation.    < end of copied text >    Medications:  Standing  atorvastatin 10 milliGRAM(s) Oral at bedtime  enalapril 10 milliGRAM(s) Oral daily  ferrous    sulfate 325 milliGRAM(s) Oral every 12 hours  heparin  Injectable 5000 Unit(s) SubCutaneous every 12 hours  metoprolol tartrate 25 milliGRAM(s) Oral two times a day    PRN Meds      Case discussed with resident    Care discussed with pt/family

## 2018-07-02 NOTE — DISCHARGE NOTE ADULT - PLAN OF CARE
Resolution of symptoms Ambulation with precaution  Follow up with PCP in 1 week Asymptomatic bacteruria, no treatment needed Follow up with PCP in 1 week Elevated, will add new medication Continue home medication  Start metoprolol 25mg oral twice daily  Follow up with PCP in 1 week  Follow up with Cardiologist in 2 weeks Asymptomatic Follow up with pulmonology in 1 weeks

## 2018-07-02 NOTE — PROGRESS NOTE ADULT - ASSESSMENT
An 85 years old female presented with mechanical fall. No LOC. No CP. No palpitations or dizziness. EKG showed Bigeminies.    ASSESSMENT:    1. Mechanical Fall  2. Bigeminies  3. HTN  4. A small mucus plug in Lt. Lung  5. Severe Pulm HTN    PLAN.    . No events on tele  . 2DECHO reviewed. NLVF  . Care D/W the card yesterday. If no events on tele and ECHO is normal can D/C her home  . Start Metoprolol 25 mg po q 12h.      * Plan of care D/W the daughter on bedside. Med rec done by me. Time spent 40 minutes.

## 2018-07-02 NOTE — DISCHARGE NOTE ADULT - CARE PLAN
Principal Discharge DX:	Fall  Goal:	Resolution of symptoms  Assessment and plan of treatment:	Ambulation with precaution  Follow up with PCP in 1 week  Secondary Diagnosis:	UTI (urinary tract infection)  Goal:	Asymptomatic bacteruria, no treatment needed  Assessment and plan of treatment:	Follow up with PCP in 1 week  Secondary Diagnosis:	HTN (hypertension)  Goal:	Elevated, will add new medication  Assessment and plan of treatment:	Continue home medication  Start metoprolol 25mg oral twice daily  Follow up with PCP in 1 week  Follow up with Cardiologist in 2 weeks  Secondary Diagnosis:	Mucus plugging of bronchi  Goal:	Asymptomatic  Assessment and plan of treatment:	Follow up with pulmonology in 1 weeks

## 2018-07-02 NOTE — PHYSICAL THERAPY INITIAL EVALUATION ADULT - SPECIFY REASON(S)
Chart reviewed. Pt. currently on bedrest, cannot be seen by PT with conflicting orders for bedrest and OOB entered simultaneously. Awaiting clarification of orders prior to PT eval, will follow.

## 2018-07-05 LAB
CULTURE RESULTS: SIGNIFICANT CHANGE UP
CULTURE RESULTS: SIGNIFICANT CHANGE UP
SPECIMEN SOURCE: SIGNIFICANT CHANGE UP
SPECIMEN SOURCE: SIGNIFICANT CHANGE UP

## 2018-07-06 LAB
CULTURE RESULTS: SIGNIFICANT CHANGE UP
SPECIMEN SOURCE: SIGNIFICANT CHANGE UP

## 2018-07-09 DIAGNOSIS — Z04.3 ENCOUNTER FOR EXAMINATION AND OBSERVATION FOLLOWING OTHER ACCIDENT: ICD-10-CM

## 2018-07-09 DIAGNOSIS — Z96.643 PRESENCE OF ARTIFICIAL HIP JOINT, BILATERAL: ICD-10-CM

## 2018-07-09 DIAGNOSIS — R00.8 OTHER ABNORMALITIES OF HEART BEAT: ICD-10-CM

## 2018-07-09 DIAGNOSIS — Z72.0 TOBACCO USE: ICD-10-CM

## 2018-07-09 DIAGNOSIS — I21.A1 MYOCARDIAL INFARCTION TYPE 2: ICD-10-CM

## 2018-07-09 DIAGNOSIS — Z91.81 HISTORY OF FALLING: ICD-10-CM

## 2018-07-09 DIAGNOSIS — F03.90 UNSPECIFIED DEMENTIA WITHOUT BEHAVIORAL DISTURBANCE: ICD-10-CM

## 2018-07-09 DIAGNOSIS — I11.0 HYPERTENSIVE HEART DISEASE WITH HEART FAILURE: ICD-10-CM

## 2018-07-09 DIAGNOSIS — I27.20 PULMONARY HYPERTENSION, UNSPECIFIED: ICD-10-CM

## 2018-07-09 DIAGNOSIS — N39.0 URINARY TRACT INFECTION, SITE NOT SPECIFIED: ICD-10-CM

## 2018-07-09 DIAGNOSIS — I50.32 CHRONIC DIASTOLIC (CONGESTIVE) HEART FAILURE: ICD-10-CM

## 2018-07-09 DIAGNOSIS — Z23 ENCOUNTER FOR IMMUNIZATION: ICD-10-CM

## 2018-07-09 DIAGNOSIS — E78.5 HYPERLIPIDEMIA, UNSPECIFIED: ICD-10-CM

## 2018-07-09 DIAGNOSIS — R09.02 HYPOXEMIA: ICD-10-CM

## 2018-11-14 PROBLEM — I10 ESSENTIAL (PRIMARY) HYPERTENSION: Chronic | Status: ACTIVE | Noted: 2018-06-30

## 2018-11-14 PROBLEM — I51.9 HEART DISEASE, UNSPECIFIED: Chronic | Status: ACTIVE | Noted: 2018-06-30

## 2018-11-27 ENCOUNTER — APPOINTMENT (OUTPATIENT)
Dept: VASCULAR SURGERY | Facility: CLINIC | Age: 83
End: 2018-11-27
Payer: MEDICARE

## 2018-11-27 VITALS
BODY MASS INDEX: 20.61 KG/M2 | DIASTOLIC BLOOD PRESSURE: 70 MMHG | SYSTOLIC BLOOD PRESSURE: 130 MMHG | HEIGHT: 62 IN | WEIGHT: 112 LBS

## 2018-11-27 DIAGNOSIS — I65.23 OCCLUSION AND STENOSIS OF BILATERAL CAROTID ARTERIES: ICD-10-CM

## 2018-11-27 PROCEDURE — 99203 OFFICE O/P NEW LOW 30 MIN: CPT

## 2018-11-27 PROCEDURE — 93880 EXTRACRANIAL BILAT STUDY: CPT

## 2019-06-06 ENCOUNTER — INPATIENT (INPATIENT)
Facility: HOSPITAL | Age: 84
LOS: 3 days | Discharge: HOME | End: 2019-06-10
Attending: HOSPITALIST | Admitting: HOSPITALIST
Payer: MEDICARE

## 2019-06-06 VITALS
TEMPERATURE: 101 F | DIASTOLIC BLOOD PRESSURE: 47 MMHG | HEART RATE: 98 BPM | SYSTOLIC BLOOD PRESSURE: 94 MMHG | OXYGEN SATURATION: 91 % | RESPIRATION RATE: 20 BRPM

## 2019-06-06 DIAGNOSIS — Z96.643 PRESENCE OF ARTIFICIAL HIP JOINT, BILATERAL: Chronic | ICD-10-CM

## 2019-06-06 DIAGNOSIS — Z90.49 ACQUIRED ABSENCE OF OTHER SPECIFIED PARTS OF DIGESTIVE TRACT: Chronic | ICD-10-CM

## 2019-06-06 LAB
ALBUMIN SERPL ELPH-MCNC: 3.4 G/DL — LOW (ref 3.5–5.2)
ALP SERPL-CCNC: 68 U/L — SIGNIFICANT CHANGE UP (ref 30–115)
ALT FLD-CCNC: 9 U/L — SIGNIFICANT CHANGE UP (ref 0–41)
ANION GAP SERPL CALC-SCNC: 16 MMOL/L — HIGH (ref 7–14)
APPEARANCE UR: ABNORMAL
APTT BLD: 33.3 SEC — SIGNIFICANT CHANGE UP (ref 27–39.2)
AST SERPL-CCNC: 21 U/L — SIGNIFICANT CHANGE UP (ref 0–41)
BACTERIA # UR AUTO: ABNORMAL /HPF
BASE EXCESS BLDV CALC-SCNC: 0.2 MMOL/L — SIGNIFICANT CHANGE UP (ref -2–2)
BASE EXCESS BLDV CALC-SCNC: 3.1 MMOL/L — HIGH (ref -2–2)
BASOPHILS # BLD AUTO: 0.01 K/UL — SIGNIFICANT CHANGE UP (ref 0–0.2)
BASOPHILS NFR BLD AUTO: 0.1 % — SIGNIFICANT CHANGE UP (ref 0–1)
BILIRUB SERPL-MCNC: 0.5 MG/DL — SIGNIFICANT CHANGE UP (ref 0.2–1.2)
BILIRUB UR-MCNC: NEGATIVE — SIGNIFICANT CHANGE UP
BUN SERPL-MCNC: 27 MG/DL — HIGH (ref 10–20)
CA-I SERPL-SCNC: 1.17 MMOL/L — SIGNIFICANT CHANGE UP (ref 1.12–1.3)
CA-I SERPL-SCNC: 1.29 MMOL/L — SIGNIFICANT CHANGE UP (ref 1.12–1.3)
CALCIUM SERPL-MCNC: 9 MG/DL — SIGNIFICANT CHANGE UP (ref 8.5–10.1)
CHLORIDE SERPL-SCNC: 99 MMOL/L — SIGNIFICANT CHANGE UP (ref 98–110)
CO2 SERPL-SCNC: 23 MMOL/L — SIGNIFICANT CHANGE UP (ref 17–32)
COLOR SPEC: SIGNIFICANT CHANGE UP
CREAT SERPL-MCNC: 1.4 MG/DL — SIGNIFICANT CHANGE UP (ref 0.7–1.5)
DIFF PNL FLD: NEGATIVE — SIGNIFICANT CHANGE UP
EOSINOPHIL # BLD AUTO: 0 K/UL — SIGNIFICANT CHANGE UP (ref 0–0.7)
EOSINOPHIL NFR BLD AUTO: 0 % — SIGNIFICANT CHANGE UP (ref 0–8)
EPI CELLS # UR: ABNORMAL /HPF
GAS PNL BLDV: 137 MMOL/L — SIGNIFICANT CHANGE UP (ref 136–145)
GAS PNL BLDV: 139 MMOL/L — SIGNIFICANT CHANGE UP (ref 136–145)
GAS PNL BLDV: SIGNIFICANT CHANGE UP
GAS PNL BLDV: SIGNIFICANT CHANGE UP
GLUCOSE SERPL-MCNC: 126 MG/DL — HIGH (ref 70–99)
GLUCOSE UR QL: NEGATIVE MG/DL — SIGNIFICANT CHANGE UP
HCO3 BLDV-SCNC: 29 MMOL/L — SIGNIFICANT CHANGE UP (ref 22–29)
HCO3 BLDV-SCNC: 30 MMOL/L — HIGH (ref 22–29)
HCT VFR BLD CALC: 41.7 % — SIGNIFICANT CHANGE UP (ref 37–47)
HCT VFR BLDA CALC: 38.2 % — SIGNIFICANT CHANGE UP (ref 34–44)
HCT VFR BLDA CALC: 65.1 % — HIGH (ref 34–44)
HGB BLD CALC-MCNC: 12.5 G/DL — LOW (ref 14–18)
HGB BLD CALC-MCNC: 21.2 G/DL — CRITICAL HIGH (ref 14–18)
HGB BLD-MCNC: 13.1 G/DL — SIGNIFICANT CHANGE UP (ref 12–16)
IMM GRANULOCYTES NFR BLD AUTO: 0.7 % — HIGH (ref 0.1–0.3)
INR BLD: 0.99 RATIO — SIGNIFICANT CHANGE UP (ref 0.65–1.3)
KETONES UR-MCNC: NEGATIVE — SIGNIFICANT CHANGE UP
LACTATE BLDV-MCNC: 1.7 MMOL/L — HIGH (ref 0.5–1.6)
LACTATE BLDV-MCNC: 3.5 MMOL/L — HIGH (ref 0.5–1.6)
LEUKOCYTE ESTERASE UR-ACNC: ABNORMAL
LYMPHOCYTES # BLD AUTO: 0.44 K/UL — LOW (ref 1.2–3.4)
LYMPHOCYTES # BLD AUTO: 3.1 % — LOW (ref 20.5–51.1)
MCHC RBC-ENTMCNC: 29.6 PG — SIGNIFICANT CHANGE UP (ref 27–31)
MCHC RBC-ENTMCNC: 31.4 G/DL — LOW (ref 32–37)
MCV RBC AUTO: 94.3 FL — SIGNIFICANT CHANGE UP (ref 81–99)
MONOCYTES # BLD AUTO: 0.62 K/UL — HIGH (ref 0.1–0.6)
MONOCYTES NFR BLD AUTO: 4.4 % — SIGNIFICANT CHANGE UP (ref 1.7–9.3)
NEUTROPHILS # BLD AUTO: 12.83 K/UL — HIGH (ref 1.4–6.5)
NEUTROPHILS NFR BLD AUTO: 91.7 % — HIGH (ref 42.2–75.2)
NITRITE UR-MCNC: POSITIVE
NRBC # BLD: 0 /100 WBCS — SIGNIFICANT CHANGE UP (ref 0–0)
PCO2 BLDV: 56 MMHG — HIGH (ref 41–51)
PCO2 BLDV: 58 MMHG — HIGH (ref 41–51)
PH BLDV: 7.3 — SIGNIFICANT CHANGE UP (ref 7.26–7.43)
PH BLDV: 7.34 — SIGNIFICANT CHANGE UP (ref 7.26–7.43)
PH UR: 5.5 — SIGNIFICANT CHANGE UP (ref 5–8)
PLATELET # BLD AUTO: 231 K/UL — SIGNIFICANT CHANGE UP (ref 130–400)
PO2 BLDV: 31 MMHG — SIGNIFICANT CHANGE UP (ref 20–40)
PO2 BLDV: 39 MMHG — SIGNIFICANT CHANGE UP (ref 20–40)
POTASSIUM BLDV-SCNC: 4.2 MMOL/L — SIGNIFICANT CHANGE UP (ref 3.3–5.6)
POTASSIUM BLDV-SCNC: 4.2 MMOL/L — SIGNIFICANT CHANGE UP (ref 3.3–5.6)
POTASSIUM SERPL-MCNC: 4.8 MMOL/L — SIGNIFICANT CHANGE UP (ref 3.5–5)
POTASSIUM SERPL-SCNC: 4.8 MMOL/L — SIGNIFICANT CHANGE UP (ref 3.5–5)
PROT SERPL-MCNC: 6 G/DL — SIGNIFICANT CHANGE UP (ref 6–8)
PROT UR-MCNC: NEGATIVE MG/DL — SIGNIFICANT CHANGE UP
PROTHROM AB SERPL-ACNC: 11.4 SEC — SIGNIFICANT CHANGE UP (ref 9.95–12.87)
RBC # BLD: 4.42 M/UL — SIGNIFICANT CHANGE UP (ref 4.2–5.4)
RBC # FLD: 13.5 % — SIGNIFICANT CHANGE UP (ref 11.5–14.5)
SAO2 % BLDV: 54 % — SIGNIFICANT CHANGE UP
SAO2 % BLDV: 71 % — SIGNIFICANT CHANGE UP
SODIUM SERPL-SCNC: 138 MMOL/L — SIGNIFICANT CHANGE UP (ref 135–146)
SP GR SPEC: 1.02 — SIGNIFICANT CHANGE UP (ref 1.01–1.03)
TROPONIN T SERPL-MCNC: 0.03 NG/ML — CRITICAL HIGH
UROBILINOGEN FLD QL: 0.2 MG/DL — SIGNIFICANT CHANGE UP (ref 0.2–0.2)
WBC # BLD: 14 K/UL — HIGH (ref 4.8–10.8)
WBC # FLD AUTO: 14 K/UL — HIGH (ref 4.8–10.8)
WBC UR QL: ABNORMAL /HPF

## 2019-06-06 PROCEDURE — 71045 X-RAY EXAM CHEST 1 VIEW: CPT | Mod: 26

## 2019-06-06 PROCEDURE — 93010 ELECTROCARDIOGRAM REPORT: CPT

## 2019-06-06 PROCEDURE — 99285 EMERGENCY DEPT VISIT HI MDM: CPT

## 2019-06-06 RX ORDER — SODIUM CHLORIDE 9 MG/ML
500 INJECTION INTRAMUSCULAR; INTRAVENOUS; SUBCUTANEOUS ONCE
Refills: 0 | Status: COMPLETED | OUTPATIENT
Start: 2019-06-06 | End: 2019-06-06

## 2019-06-06 RX ORDER — CEFTRIAXONE 500 MG/1
1 INJECTION, POWDER, FOR SOLUTION INTRAMUSCULAR; INTRAVENOUS EVERY 24 HOURS
Refills: 0 | Status: DISCONTINUED | OUTPATIENT
Start: 2019-06-06 | End: 2019-06-08

## 2019-06-06 RX ORDER — BENZOYL PEROXIDE MICRONIZED 5.8 %
0 TOWELETTE (EA) TOPICAL
Qty: 0 | Refills: 0 | DISCHARGE

## 2019-06-06 RX ORDER — ATORVASTATIN CALCIUM 80 MG/1
10 TABLET, FILM COATED ORAL AT BEDTIME
Refills: 0 | Status: DISCONTINUED | OUTPATIENT
Start: 2019-06-06 | End: 2019-06-10

## 2019-06-06 RX ORDER — FERROUS SULFATE 325(65) MG
325 TABLET ORAL DAILY
Refills: 0 | Status: DISCONTINUED | OUTPATIENT
Start: 2019-06-06 | End: 2019-06-10

## 2019-06-06 RX ORDER — CHLORHEXIDINE GLUCONATE 213 G/1000ML
1 SOLUTION TOPICAL
Refills: 0 | Status: DISCONTINUED | OUTPATIENT
Start: 2019-06-06 | End: 2019-06-10

## 2019-06-06 RX ORDER — SODIUM CHLORIDE 9 MG/ML
1000 INJECTION INTRAMUSCULAR; INTRAVENOUS; SUBCUTANEOUS
Refills: 0 | Status: DISCONTINUED | OUTPATIENT
Start: 2019-06-06 | End: 2019-06-07

## 2019-06-06 RX ORDER — FERROUS SULFATE 325(65) MG
1 TABLET ORAL
Qty: 0 | Refills: 0 | DISCHARGE

## 2019-06-06 RX ORDER — ATORVASTATIN CALCIUM 80 MG/1
1 TABLET, FILM COATED ORAL
Qty: 0 | Refills: 0 | DISCHARGE

## 2019-06-06 RX ORDER — ATORVASTATIN CALCIUM 80 MG/1
0 TABLET, FILM COATED ORAL
Qty: 0 | Refills: 0 | DISCHARGE

## 2019-06-06 RX ORDER — METOPROLOL TARTRATE 50 MG
25 TABLET ORAL
Refills: 0 | Status: DISCONTINUED | OUTPATIENT
Start: 2019-06-06 | End: 2019-06-10

## 2019-06-06 RX ORDER — CEFEPIME 1 G/1
2000 INJECTION, POWDER, FOR SOLUTION INTRAMUSCULAR; INTRAVENOUS ONCE
Refills: 0 | Status: COMPLETED | OUTPATIENT
Start: 2019-06-06 | End: 2019-06-06

## 2019-06-06 RX ORDER — ACETAMINOPHEN 500 MG
650 TABLET ORAL EVERY 6 HOURS
Refills: 0 | Status: DISCONTINUED | OUTPATIENT
Start: 2019-06-06 | End: 2019-06-10

## 2019-06-06 RX ORDER — HEPARIN SODIUM 5000 [USP'U]/ML
5000 INJECTION INTRAVENOUS; SUBCUTANEOUS EVERY 8 HOURS
Refills: 0 | Status: DISCONTINUED | OUTPATIENT
Start: 2019-06-06 | End: 2019-06-10

## 2019-06-06 RX ORDER — ACETAMINOPHEN 500 MG
650 TABLET ORAL ONCE
Refills: 0 | Status: COMPLETED | OUTPATIENT
Start: 2019-06-06 | End: 2019-06-06

## 2019-06-06 RX ORDER — SODIUM CHLORIDE 9 MG/ML
1500 INJECTION, SOLUTION INTRAVENOUS ONCE
Refills: 0 | Status: COMPLETED | OUTPATIENT
Start: 2019-06-06 | End: 2019-06-06

## 2019-06-06 RX ADMIN — SODIUM CHLORIDE 1000 MILLILITER(S): 9 INJECTION INTRAMUSCULAR; INTRAVENOUS; SUBCUTANEOUS at 23:08

## 2019-06-06 RX ADMIN — Medication 650 MILLIGRAM(S): at 17:13

## 2019-06-06 RX ADMIN — SODIUM CHLORIDE 1500 MILLILITER(S): 9 INJECTION, SOLUTION INTRAVENOUS at 15:04

## 2019-06-06 RX ADMIN — ATORVASTATIN CALCIUM 10 MILLIGRAM(S): 80 TABLET, FILM COATED ORAL at 22:17

## 2019-06-06 RX ADMIN — CEFTRIAXONE 100 GRAM(S): 500 INJECTION, POWDER, FOR SOLUTION INTRAMUSCULAR; INTRAVENOUS at 22:16

## 2019-06-06 RX ADMIN — HEPARIN SODIUM 5000 UNIT(S): 5000 INJECTION INTRAVENOUS; SUBCUTANEOUS at 22:17

## 2019-06-06 RX ADMIN — SODIUM CHLORIDE 75 MILLILITER(S): 9 INJECTION INTRAMUSCULAR; INTRAVENOUS; SUBCUTANEOUS at 23:57

## 2019-06-06 RX ADMIN — CEFEPIME 100 MILLIGRAM(S): 1 INJECTION, POWDER, FOR SOLUTION INTRAMUSCULAR; INTRAVENOUS at 15:42

## 2019-06-06 NOTE — ED PROVIDER NOTE - PHYSICAL EXAMINATION
CONSTITUTIONAL: WA / WN / NAD  HEAD: NCAT  EYES: PERRL; EOMI;   ENT: Normal pharynx; mucous membranes pink/moist, no erythema.  NECK: Supple; no meningeal signs  CARD: RRR; nl S1/S2; no M/R/G.   RESP: Respiratory rate and effort are normal; breath sounds clear and equal bilaterally.  ABD: Soft, NT ND   MSK/EXT: No gross deformities; full range of motion.  SKIN: Warm and dry; 1+ b/l pitting edema   NEURO: AAOx3  PSYCH: Memory Intact, Normal Affect

## 2019-06-06 NOTE — ED PROVIDER NOTE - CLINICAL SUMMARY MEDICAL DECISION MAKING FREE TEXT BOX
86 female here for fever with constitutional symptoms. Had sepsis evaluation in ED with labs, imaging, IVF, IV ABX.  Found to have UTI, BP improved with IVF in ED, plan d/w patient and family, will admit for continued management.

## 2019-06-06 NOTE — ED PROVIDER NOTE - OBJECTIVE STATEMENT
86 year old female with a pmh of htn hld 86 year old female with a pmh of htn hld on bactrim at home (received two doses yesterday night and this morning for what they thought was a "leg infection") brought in for lethargy and fever they noticed today. Patient had one episode of vomiting today but denies any n/abdominal/pain diarrhea. Patietn denies cough chest pain urinary frequency.urgency burning. Patient is a current smoker.

## 2019-06-06 NOTE — H&P ADULT - NSICDXPASTSURGICALHX_GEN_ALL_CORE_FT
PAST SURGICAL HISTORY:  H/O bilateral hip replacements Right Hip ORIF on Xray    History of cholecystectomy

## 2019-06-06 NOTE — ED PROVIDER NOTE - NS ED ROS FT
Constitutional: See HPI.  Eyes: No visual changes,   ENMT: . No neck pain   Cardiac: No cp  Respiratory: No cough   GI: No nausea, +vomiting, no diarrhea or abdominal pain.  : No dysuria, frequency or burning.   MS: No myalgia, muscle weakness, joint pain or back pain.  Neuro: No headache   Skin: see hpi

## 2019-06-06 NOTE — H&P ADULT - NSHPPHYSICALEXAM_GEN_ALL_CORE
GEN: NAD, comfortable  CARDIO: RRR, no m/r/g  RESP: CTAB, no w/r/r  ABD: soft, NT/ND, +BS  EXT: no edema, pp b/l  NEURO: AAOx3, grossly normal GEN: NAD, comfortable, elderly  CARDIO: RRR, no m/r/g  RESP: CTAB, no w/r/r  ABD: soft, NT/ND, +BS  EXT: no edema, pp b/l, mild erythema noted b/l LE  NEURO: AAOx2, grossly normal, patient was lethargic and sleep at time of exam

## 2019-06-06 NOTE — H&P ADULT - ASSESSMENT
85 yo F with PMH HTN, DLD, active smoker presented to ED from home after patient was noted to have worsening confusion, lethargy, and fever (T=101) at home. In ED, UA was +ve.    #) Sepsis 2/2 pyelonephritis  - WBC=14k, T=101, UA +ve  - f/u blood + urine Cx  - c/w Rocephin for now  - Tylenol PRN for fever    #) HTN - c/w Enalapril    #) DLD - c/w statin    DVT ppx: Lovenox subQ  Diet: DASH  Activity: OOBTC  Code status: FULL  Dispo: pending 85 yo F with PMH HTN, DLD, active smoker presented to ED from home after patient was noted to have worsening confusion, lethargy, and fever (T=101) at home. In ED, UA was +ve.    #) Sepsis 2/2 pyelonephritis  - WBC=14k, T=101, UA +ve  - f/u blood + urine Cx  - c/w Rocephin for now  - Tylenol PRN for fever    #) HTN - c/w Enalapril    #) DLD - c/w statin    DVT ppx: Heparin subQ  Diet: DASH  Activity: OOBTC  Code status: FULL  Dispo: pending 85 yo F with PMH HTN, DLD, active smoker presented to ED from home after patient was noted to have worsening confusion, lethargy, and fever (T=101) at home. In ED, UA was +ve.    #) Sepsis 2/2 pyelonephritis  - WBC=14k, T=101, UA +ve  - f/u blood + urine Cx  - c/w Rocephin for now  - Tylenol PRN for fever    #) HTN - c/w Enalapril    #) DLD - c/w statin    DVT ppx: Heparin subQ  Diet: DASH  Activity: OOBTC  Code status: FULL  Dispo: anticipate home

## 2019-06-06 NOTE — H&P ADULT - ATTENDING COMMENTS
86F PMHx HTN, HLD here with SEPSIS PRESENT ON ADMISSION, unknown source.    1. SEPSIS PRESENT ON ADMISSION  unclear source, ua mildly positive  f/u ucx, bcx  change ceftriaxone to cefepime  d/c ivf  check us abd  pt appears confused to reach out to family for more information  2. HTN  enalapril 10  lopressor 25 bid  3. HLD  lipitor 10  4. DVT ppx  subq hep    #Progress Note Handoff:  Pending (specify):  Consults_________, Tests________, Test Results_______, Other__bcx, ucx_______  Family discussion:to reach out to family  Disposition: Home___/SNF___/Other________/Unknown at this time_____x___

## 2019-06-06 NOTE — H&P ADULT - NSHPSOCIALHISTORY_GEN_ALL_CORE
Active smoker  Denies alcohol or illicit drug use Active smoker ~4 cig / day. Has been smoking all of adult life  Denies alcohol or illicit drug use  Lives at home with daughter  Ambulatory with walker  Has HHA 8-4

## 2019-06-06 NOTE — H&P ADULT - NSHPLABSRESULTS_GEN_ALL_CORE
13.1   14.00 )-----------( 231      ( 2019 14:54 )             41.7         138  |  99  |  27<H>  ----------------------------<  126<H>  4.8   |  23  |  1.4    Ca    9.0      2019 14:54    TPro  6.0  /  Alb  3.4<L>  /  TBili  0.5  /  DBili  x   /  AST  21  /  ALT  9   /  AlkPhos  68          Urinalysis Basic - ( 2019 16:45 )    Color: Dark Yellow / Appearance: Cloudy / S.020 / pH: x  Gluc: x / Ketone: Negative  / Bili: Negative / Urobili: 0.2 mg/dL   Blood: x / Protein: Negative mg/dL / Nitrite: Positive   Leuk Esterase: Small / RBC: x / WBC 6-10 /HPF   Sq Epi: x / Non Sq Epi: Few /HPF / Bacteria: Many /HPF    PT/INR - ( 2019 14:54 )   PT: 11.40 sec;   INR: 0.99 ratio      PTT - ( 2019 14:54 )  PTT:33.3 sec    Lactate Trend    CARDIAC MARKERS ( 2019 14:54 )  x     / 0.03 ng/mL / x     / x     / x

## 2019-06-06 NOTE — H&P ADULT - NSICDXPASTMEDICALHX_GEN_ALL_CORE_FT
PAST MEDICAL HISTORY:  Diastolic dysfunction     HTN (hypertension) PAST MEDICAL HISTORY:  Diastolic dysfunction     HLD (hyperlipidemia)     HTN (hypertension)

## 2019-06-06 NOTE — ED PROVIDER NOTE - PROGRESS NOTE DETAILS
Sepsis suspected at this time. I personally evaluated the patient. I reviewed the Resident’s or Physician Assistant’s note (as assigned above), and agree with the findings and plan except as documented in my note.   85 Y/O F + SMOKER, HTN, DLD BROUGHT TO ED FROM HOME WITH FEVER UP  TODAY. + CONFUSION AND LETHARGY TODAY. DAUGHTER REPORTS POOR APPETITE AND PO INTAKE X 2 WEEKS. NO COUGH, SOB. NO CP. + ONE EPISODE OF VOMITING TODAY. NO DIARRHEA. PT STARTED BACTRIM YESTERDAY FOR B/L LEG EDEMA AND ERYTHEMA WITH SOME IMPROVEMENT IN SXS TODAY. NO ABD PAIN. NORMAL URINATION. VITALS NOTED. ALERT OX3 THIN NCAT PERRL. EOMI. OP NORMAL. MMM. NECK SUPPLE. LUNGS CLEAR B/L. RRR. S1S2. ABD- SOFT NONTENDER. + B/L LEG EDEMA AND MILD ERYTHEMA. CN 2-12 INTACT. NEURO EXAM NONFOCAL. PT SIGNED OUT TO DR. CALL, FOLLOW UP LAB RESULTS, URINALYSIS, REASSESS AND DISPO.

## 2019-06-07 LAB
24R-OH-CALCIDIOL SERPL-MCNC: 15 NG/ML — LOW (ref 30–80)
ANION GAP SERPL CALC-SCNC: 6 MMOL/L — LOW (ref 7–14)
BASOPHILS # BLD AUTO: 0.01 K/UL — SIGNIFICANT CHANGE UP (ref 0–0.2)
BASOPHILS NFR BLD AUTO: 0.2 % — SIGNIFICANT CHANGE UP (ref 0–1)
BUN SERPL-MCNC: 32 MG/DL — HIGH (ref 10–20)
CALCIUM SERPL-MCNC: 8 MG/DL — LOW (ref 8.5–10.1)
CHLORIDE SERPL-SCNC: 106 MMOL/L — SIGNIFICANT CHANGE UP (ref 98–110)
CHOLEST SERPL-MCNC: 120 MG/DL — SIGNIFICANT CHANGE UP (ref 100–200)
CO2 SERPL-SCNC: 28 MMOL/L — SIGNIFICANT CHANGE UP (ref 17–32)
CREAT SERPL-MCNC: 1.2 MG/DL — SIGNIFICANT CHANGE UP (ref 0.7–1.5)
CULTURE RESULTS: SIGNIFICANT CHANGE UP
EOSINOPHIL # BLD AUTO: 0.01 K/UL — SIGNIFICANT CHANGE UP (ref 0–0.7)
EOSINOPHIL NFR BLD AUTO: 0.2 % — SIGNIFICANT CHANGE UP (ref 0–8)
GLUCOSE SERPL-MCNC: 82 MG/DL — SIGNIFICANT CHANGE UP (ref 70–99)
HCT VFR BLD CALC: 36.6 % — LOW (ref 37–47)
HDLC SERPL-MCNC: 49 MG/DL — LOW
HGB BLD-MCNC: 11.4 G/DL — LOW (ref 12–16)
IMM GRANULOCYTES NFR BLD AUTO: 0.6 % — HIGH (ref 0.1–0.3)
LACTATE SERPL-SCNC: 1.2 MMOL/L — SIGNIFICANT CHANGE UP (ref 0.5–2.2)
LIPID PNL WITH DIRECT LDL SERPL: 53 MG/DL — SIGNIFICANT CHANGE UP (ref 4–129)
LYMPHOCYTES # BLD AUTO: 0.24 K/UL — LOW (ref 1.2–3.4)
LYMPHOCYTES # BLD AUTO: 3.7 % — LOW (ref 20.5–51.1)
MAGNESIUM SERPL-MCNC: 1.6 MG/DL — LOW (ref 1.8–2.4)
MCHC RBC-ENTMCNC: 29.7 PG — SIGNIFICANT CHANGE UP (ref 27–31)
MCHC RBC-ENTMCNC: 31.1 G/DL — LOW (ref 32–37)
MCV RBC AUTO: 95.3 FL — SIGNIFICANT CHANGE UP (ref 81–99)
MONOCYTES # BLD AUTO: 0.32 K/UL — SIGNIFICANT CHANGE UP (ref 0.1–0.6)
MONOCYTES NFR BLD AUTO: 4.9 % — SIGNIFICANT CHANGE UP (ref 1.7–9.3)
NEUTROPHILS # BLD AUTO: 5.9 K/UL — SIGNIFICANT CHANGE UP (ref 1.4–6.5)
NEUTROPHILS NFR BLD AUTO: 90.4 % — HIGH (ref 42.2–75.2)
NRBC # BLD: 0 /100 WBCS — SIGNIFICANT CHANGE UP (ref 0–0)
PHOSPHATE SERPL-MCNC: 2.5 MG/DL — SIGNIFICANT CHANGE UP (ref 2.1–4.9)
PLATELET # BLD AUTO: 162 K/UL — SIGNIFICANT CHANGE UP (ref 130–400)
POTASSIUM SERPL-MCNC: 4.7 MMOL/L — SIGNIFICANT CHANGE UP (ref 3.5–5)
POTASSIUM SERPL-SCNC: 4.7 MMOL/L — SIGNIFICANT CHANGE UP (ref 3.5–5)
RBC # BLD: 3.84 M/UL — LOW (ref 4.2–5.4)
RBC # FLD: 14 % — SIGNIFICANT CHANGE UP (ref 11.5–14.5)
SODIUM SERPL-SCNC: 140 MMOL/L — SIGNIFICANT CHANGE UP (ref 135–146)
SPECIMEN SOURCE: SIGNIFICANT CHANGE UP
TOTAL CHOLESTEROL/HDL RATIO MEASUREMENT: 2.4 RATIO — LOW (ref 4–5.5)
TRIGL SERPL-MCNC: 95 MG/DL — SIGNIFICANT CHANGE UP (ref 10–149)
TROPONIN T SERPL-MCNC: 0.02 NG/ML — HIGH
WBC # BLD: 6.52 K/UL — SIGNIFICANT CHANGE UP (ref 4.8–10.8)
WBC # FLD AUTO: 6.52 K/UL — SIGNIFICANT CHANGE UP (ref 4.8–10.8)

## 2019-06-07 PROCEDURE — 99223 1ST HOSP IP/OBS HIGH 75: CPT

## 2019-06-07 PROCEDURE — 76705 ECHO EXAM OF ABDOMEN: CPT | Mod: 26

## 2019-06-07 RX ADMIN — SODIUM CHLORIDE 75 MILLILITER(S): 9 INJECTION INTRAMUSCULAR; INTRAVENOUS; SUBCUTANEOUS at 13:50

## 2019-06-07 RX ADMIN — CHLORHEXIDINE GLUCONATE 1 APPLICATION(S): 213 SOLUTION TOPICAL at 05:28

## 2019-06-07 RX ADMIN — HEPARIN SODIUM 5000 UNIT(S): 5000 INJECTION INTRAVENOUS; SUBCUTANEOUS at 05:49

## 2019-06-07 RX ADMIN — HEPARIN SODIUM 5000 UNIT(S): 5000 INJECTION INTRAVENOUS; SUBCUTANEOUS at 13:50

## 2019-06-07 RX ADMIN — Medication 25 MILLIGRAM(S): at 17:09

## 2019-06-07 RX ADMIN — ATORVASTATIN CALCIUM 10 MILLIGRAM(S): 80 TABLET, FILM COATED ORAL at 21:51

## 2019-06-07 RX ADMIN — HEPARIN SODIUM 5000 UNIT(S): 5000 INJECTION INTRAVENOUS; SUBCUTANEOUS at 21:51

## 2019-06-07 RX ADMIN — Medication 325 MILLIGRAM(S): at 11:09

## 2019-06-07 RX ADMIN — Medication 25 MILLIGRAM(S): at 05:49

## 2019-06-07 RX ADMIN — CEFTRIAXONE 100 GRAM(S): 500 INJECTION, POWDER, FOR SOLUTION INTRAMUSCULAR; INTRAVENOUS at 21:51

## 2019-06-07 NOTE — PROGRESS NOTE ADULT - ASSESSMENT
85 yo F with PMH HTN, DLD, active smoker presented to ED from home after patient was noted to have worsening confusion, lethargy, and fever (T=101) at home. In ED, UA was +ve.    #) Sepsis 2/2 pyelonephritis  - WBC=14k, T=101, UA +ve  - WBC improving.  - f/u blood + urine Cx  - c/w Rocephin   - Tylenol PRN for fever    #) HTN - c/w Enalapril    #) DLD - c/w statin    DVT ppx: Heparin subQ  Diet: DASH  Activity: OOBTC  Code status: FULL  Dispo: anticipate home

## 2019-06-07 NOTE — CONSULT NOTE ADULT - ASSESSMENT
IMPRESSION: Rehab of Debilitation     PRECAUTIONS: [    ] Cardiac  [    ] Respiratory  [    ] Seizures [    ] Contact Isolation  [    ] Droplet Isolation  [    ] Other    Weight Bearing Status:     RECOMMENDATION:    Out of Bed to Chair     DVT/Decubiti Prophylaxis    REHAB PLAN:     [   x  ] Bedside P/T 3-5 times a week   [     ] Bedside O/T  2-3 times a week   [     ] No Rehab Therapy Indicated   [     ]  Speech Therapy   Conditioning/ROM                                 ADL  Bed Mobility                                            Conditioning/ROM  Transfers                                                  Bed Mobility  Sitting /Standing Balance                      Transfers                                        Gait Training                                            Sitting/Standing Balance  Stair Training [   ]Applicable                 Home equipment Eval                                                                     Splinting  [   ] Only      GOALS:   ADL   [  x  ]   Independent         Transfers  [  x  ] Independent            Ambulation  [  x   ] Independent     [  x   ] With device                            [    ]  CG                                               [    ]  CG                                                    [     ] CG                            [    ] Min A                                          [    ] Min A                                                [     ] Min  A          DISCHARGE PLAN:   [     ]  Good candidate for Intensive Rehabilitation/Hospital based                                             Will tolerate 3hrs Intensive Rehab Daily                                       [ x     ]  Short Term Rehab in Skilled Nursing Facility                               vs                                     [  x    ]  Home with Outpatient or VN services                                         [      ]  Possible Candidate for Intensive Hospital based Rehab

## 2019-06-07 NOTE — CONSULT NOTE ADULT - SUBJECTIVE AND OBJECTIVE BOX
Patient is a 86y old  Female who presents with a chief complaint of Urosepsis (2019 10:58)    HPI:  85 yo F with PMH HTN, DLD, active smoker presented to ED from home after family noticed patient was unsteady on feet and having worsening confusion, lethargy, and fever (T=101) at home. Collateral history obtained from daughter at bedside. Report that patient had LE erythema noted day PTP. Went to PCP office, was given course of Bactrim. Took 2 doses of Bactirm. Stated LE erythema improved but patient was still unsteady, confused, and had a fever. Also had 1 episode of NBNB emesis (after taking Bactrim on empty stomach). Denies any dysuria, frequency, urgency, abdominal pain, diarrhea, chest pain, SOB, abdominal pain.    Daughter states patient has been having decreased PO intake for past couple of weeks. Discussed with PCP - said she only lost 1 lb so was not concerning. Goes to [facility] during , has HHA from . Is continent of urine and stool, wears diapers around the clock at home. Ambulatory with walker. Feeds herself although cannot bath herself. Lives at home with daughter. (2019 19:06)      PAST MEDICAL & SURGICAL HISTORY:  HLD (hyperlipidemia)  Diastolic dysfunction  HTN (hypertension)  History of cholecystectomy  H/O bilateral hip replacements: Right Hip ORIF on Xray      Hospital Course: epsis 2/2 pyelonephritis  - WBC=14k, T=101, UA +ve  - WBC improving.  - f/u blood + urine Cx  - c/w Rocephin   - Tylenol PRN for fever    #) HTN - c/w Enalapril    #) DLD - c/w statin    DVT ppx: Heparin subQ  Diet: DASH  Activity: OOBTC    TODAY'S SUBJECTIVE & REVIEW OF SYMPTOMS:     Constitutional Weakness   Cardio WNL   Resp WNL   GI WNL  Heme WNL  Endo WNL  Skin WNL  MSK WNL  Neuro WNL  Cognitive WNL  Psych WNL      MEDICATIONS  (STANDING):  atorvastatin 10 milliGRAM(s) Oral at bedtime  cefTRIAXone   IVPB 1 Gram(s) IV Intermittent every 24 hours  chlorhexidine 4% Liquid 1 Application(s) Topical <User Schedule>  enalapril 10 milliGRAM(s) Oral daily  ferrous    sulfate 325 milliGRAM(s) Oral daily  heparin  Injectable 5000 Unit(s) SubCutaneous every 8 hours  metoprolol tartrate 25 milliGRAM(s) Oral two times a day  sodium chloride 0.9%. 1000 milliLiter(s) (75 mL/Hr) IV Continuous <Continuous>    MEDICATIONS  (PRN):  acetaminophen   Tablet .. 650 milliGRAM(s) Oral every 6 hours PRN Temp greater or equal to 38.5C (101.3F)      FAMILY HISTORY:  No pertinent family history in first degree relatives      Allergies    No Known Allergies    Intolerances        SOCIAL HISTORY:    [    ] Etoh  [    ] Smoking  [    ] Substance abuse     Home Environment:  [    ] Home Alone  [  x  ] Lives with Family  [  x  ] Home Health Aid 8x7    Dwelling:  [    ] Apartment  [  x  ] Private House  [    ] Adult Home  [    ] Skilled Nursing Facility      [    ] Short Term  [    ] Long Term  [    ] Stairs                           [    ] Elevator     FUNCTIONAL STATUS PTA: (Check all that apply)  Ambulation: [     ]Independent    [ x   ] Dependent     [    ] Non-Ambulatory  Assistive Device: [    ] SA Cane  [    ]  Q Cane  [   x ] Walker  [    ]  Wheelchair  ADL : [    ] Independent  [  x  ]  Dependent       Vital Signs Last 24 Hrs  T(C): 35.7 (2019 05:09), Max: 38.4 (2019 14:22)  T(F): 96.2 (2019 05:09), Max: 101.2 (2019 14:22)  HR: 81 (2019 05:09) (69 - 98)  BP: 108/57 (2019 05:09) (90/54 - 123/53)  BP(mean): --  RR: 20 (2019 05:09) (18 - 20)  SpO2: 98% (2019 07:52) (91% - 99%)      PHYSICAL EXAM: [place] on O2  GENERAL: NAD, well-groomed, well-developed  HEAD:  Atraumatic, Normocephalic  EYES: EOMI, PERRLA, conjunctiva and sclera clear  NECK: Supple  CHEST/LUNG: Clear bilaterally  HEART: Regular rate and rhythm  ABDOMEN: Soft, Nontender, Nondistended; Bowel sounds present  EXTREMITIES:  no calf tenderness,no edema BLESdistal erythema BLES    NERVOUS SYSTEM:  Cranial Nerves 2-12 intact [ x   ] Abnormal  [    ]  ROM: WFL all extremities [ x   ]  Abnormal [     ]  Motor Strength: WFL all extremities  [  x  ]  Abnormal [    ]  Sensation: intact to light touch [   x ] Abnormal [    ]      FUNCTIONAL STATUS:  Bed Mobility: [   ]  Independent [    ]  Supervision [  x  ]  Needs Assistance [  ]  N/A  Transfers: [    ]  Independent [    ]  Supervision [ x   ]  Needs Assistance [    ]  N/A    Ambulation:  [    ]  Independent [    ]  Supervision [x    ]  Needs Assistance [    ]  N/A   ADL:  [    ]   Independent [   x ] Requires Assistance [    ] N/A       LABS:                        11.4   6.52  )-----------( 162      ( 2019 06:53 )             36.6     06-07    140  |  106  |  32<H>  ----------------------------<  82  4.7   |  28  |  1.2    Ca    8.0<L>      2019 06:53  Phos  2.5     -  Mg     1.6     -    TPro  6.0  /  Alb  3.4<L>  /  TBili  0.5  /  DBili  x   /  AST  21  /  ALT  9   /  AlkPhos  68  06-06    PT/INR - ( 2019 14:54 )   PT: 11.40 sec;   INR: 0.99 ratio         PTT - ( 2019 14:54 )  PTT:33.3 sec  Urinalysis Basic - ( 2019 16:45 )    Color: Dark Yellow / Appearance: Cloudy / S.020 / pH: x  Gluc: x / Ketone: Negative  / Bili: Negative / Urobili: 0.2 mg/dL   Blood: x / Protein: Negative mg/dL / Nitrite: Positive   Leuk Esterase: Small / RBC: x / WBC 6-10 /HPF   Sq Epi: x / Non Sq Epi: Few /HPF / Bacteria: Many /HPF        RADIOLOGY & ADDITIONAL STUDIES:

## 2019-06-07 NOTE — PROGRESS NOTE ADULT - SUBJECTIVE AND OBJECTIVE BOX
SUBJECTIVE:    Patient is a 86y old Female who presents with a chief complaint of Urosepsis (2019 19:06)    Currently admitted to medicine with the primary diagnosis of UTI (urinary tract infection)     Today is hospital day 1d.  No o/n events.   Pt still confused AOX1    PAST MEDICAL & SURGICAL HISTORY  HLD (hyperlipidemia)  Diastolic dysfunction  HTN (hypertension)  History of cholecystectomy  H/O bilateral hip replacements: Right Hip ORIF on Xray        ALLERGIES:  No Known Allergies    MEDICATIONS:  STANDING MEDICATIONS  atorvastatin 10 milliGRAM(s) Oral at bedtime  cefTRIAXone   IVPB 1 Gram(s) IV Intermittent every 24 hours  chlorhexidine 4% Liquid 1 Application(s) Topical <User Schedule>  enalapril 10 milliGRAM(s) Oral daily  ferrous    sulfate 325 milliGRAM(s) Oral daily  heparin  Injectable 5000 Unit(s) SubCutaneous every 8 hours  metoprolol tartrate 25 milliGRAM(s) Oral two times a day  sodium chloride 0.9%. 1000 milliLiter(s) IV Continuous <Continuous>    PRN MEDICATIONS  acetaminophen   Tablet .. 650 milliGRAM(s) Oral every 6 hours PRN    VITALS:   T(F): 96.2  HR: 81  BP: 108/57  RR: 20  SpO2: 98%    LABS:                        11.4   6.52  )-----------( 162      ( 2019 06:53 )             36.6     06-07    140  |  106  |  32<H>  ----------------------------<  82  4.7   |  28  |  1.2    Ca    8.0<L>      2019 06:53  Phos  2.5     06-  Mg     1.6     -07    TPro  6.0  /  Alb  3.4<L>  /  TBili  0.5  /  DBili  x   /  AST  21  /  ALT  9   /  AlkPhos  68  06-06    PT/INR - ( 2019 14:54 )   PT: 11.40 sec;   INR: 0.99 ratio         PTT - ( 2019 14:54 )  PTT:33.3 sec  Urinalysis Basic - ( 2019 16:45 )    Color: Dark Yellow / Appearance: Cloudy / S.020 / pH: x  Gluc: x / Ketone: Negative  / Bili: Negative / Urobili: 0.2 mg/dL   Blood: x / Protein: Negative mg/dL / Nitrite: Positive   Leuk Esterase: Small / RBC: x / WBC 6-10 /HPF   Sq Epi: x / Non Sq Epi: Few /HPF / Bacteria: Many /HPF        Lactate, Blood: 1.2 mmol/L (19 @ 06:53)  Troponin T, Serum: 0.02 ng/mL <H> (19 @ 06:53)  Troponin T, Serum: 0.03 ng/mL <HH> (19 @ 14:54)      CARDIAC MARKERS ( 2019 06:53 )  x     / 0.02 ng/mL / x     / x     / x      CARDIAC MARKERS ( 2019 14:54 )  x     / 0.03 ng/mL / x     / x     / x          RADIOLOGY:    PHYSICAL EXAM:  GEN: No acute distress  LUNGS: Clear to auscultation bilaterally   HEART: S1/S2 present. RRR.   ABD: Soft, non-tender, non-distended. Bowel sounds present  EXT: Skin Intact.   NEURO: AAOX1 to self.

## 2019-06-08 LAB
ALBUMIN SERPL ELPH-MCNC: 2.6 G/DL — LOW (ref 3.5–5.2)
ALP SERPL-CCNC: 50 U/L — SIGNIFICANT CHANGE UP (ref 30–115)
ALT FLD-CCNC: 28 U/L — SIGNIFICANT CHANGE UP (ref 0–41)
ANION GAP SERPL CALC-SCNC: 6 MMOL/L — LOW (ref 7–14)
ANISOCYTOSIS BLD QL: SLIGHT — SIGNIFICANT CHANGE UP
AST SERPL-CCNC: 46 U/L — HIGH (ref 0–41)
BASOPHILS # BLD AUTO: 0 K/UL — SIGNIFICANT CHANGE UP (ref 0–0.2)
BASOPHILS NFR BLD AUTO: 0 % — SIGNIFICANT CHANGE UP (ref 0–1)
BILIRUB SERPL-MCNC: 0.3 MG/DL — SIGNIFICANT CHANGE UP (ref 0.2–1.2)
BUN SERPL-MCNC: 24 MG/DL — HIGH (ref 10–20)
CALCIUM SERPL-MCNC: 8.2 MG/DL — LOW (ref 8.5–10.1)
CHLORIDE SERPL-SCNC: 109 MMOL/L — SIGNIFICANT CHANGE UP (ref 98–110)
CO2 SERPL-SCNC: 26 MMOL/L — SIGNIFICANT CHANGE UP (ref 17–32)
CREAT SERPL-MCNC: 0.8 MG/DL — SIGNIFICANT CHANGE UP (ref 0.7–1.5)
EOSINOPHIL # BLD AUTO: 0.11 K/UL — SIGNIFICANT CHANGE UP (ref 0–0.7)
EOSINOPHIL NFR BLD AUTO: 2.6 % — SIGNIFICANT CHANGE UP (ref 0–8)
GIANT PLATELETS BLD QL SMEAR: PRESENT — SIGNIFICANT CHANGE UP
GLUCOSE SERPL-MCNC: 83 MG/DL — SIGNIFICANT CHANGE UP (ref 70–99)
HCT VFR BLD CALC: 35 % — LOW (ref 37–47)
HGB BLD-MCNC: 10.9 G/DL — LOW (ref 12–16)
LYMPHOCYTES # BLD AUTO: 0.29 K/UL — LOW (ref 1.2–3.4)
LYMPHOCYTES # BLD AUTO: 7 % — LOW (ref 20.5–51.1)
MANUAL SMEAR VERIFICATION: SIGNIFICANT CHANGE UP
MCHC RBC-ENTMCNC: 29.5 PG — SIGNIFICANT CHANGE UP (ref 27–31)
MCHC RBC-ENTMCNC: 31.1 G/DL — LOW (ref 32–37)
MCV RBC AUTO: 94.6 FL — SIGNIFICANT CHANGE UP (ref 81–99)
MONOCYTES # BLD AUTO: 0.08 K/UL — LOW (ref 0.1–0.6)
MONOCYTES NFR BLD AUTO: 1.8 % — SIGNIFICANT CHANGE UP (ref 1.7–9.3)
NEUTROPHILS # BLD AUTO: 3.69 K/UL — SIGNIFICANT CHANGE UP (ref 1.4–6.5)
NEUTROPHILS NFR BLD AUTO: 84.2 % — HIGH (ref 42.2–75.2)
NEUTS BAND # BLD: 3.5 % — SIGNIFICANT CHANGE UP (ref 0–6)
PLAT MORPH BLD: NORMAL — SIGNIFICANT CHANGE UP
PLATELET # BLD AUTO: 132 K/UL — SIGNIFICANT CHANGE UP (ref 130–400)
POIKILOCYTOSIS BLD QL AUTO: SLIGHT — SIGNIFICANT CHANGE UP
POTASSIUM SERPL-MCNC: 5.1 MMOL/L — HIGH (ref 3.5–5)
POTASSIUM SERPL-SCNC: 5.1 MMOL/L — HIGH (ref 3.5–5)
PROT SERPL-MCNC: 5 G/DL — LOW (ref 6–8)
RBC # BLD: 3.7 M/UL — LOW (ref 4.2–5.4)
RBC # FLD: 13.9 % — SIGNIFICANT CHANGE UP (ref 11.5–14.5)
RBC BLD AUTO: NORMAL — SIGNIFICANT CHANGE UP
SODIUM SERPL-SCNC: 141 MMOL/L — SIGNIFICANT CHANGE UP (ref 135–146)
VARIANT LYMPHS # BLD: 0.9 % — SIGNIFICANT CHANGE UP (ref 0–5)
WBC # BLD: 4.21 K/UL — LOW (ref 4.8–10.8)
WBC # FLD AUTO: 4.21 K/UL — LOW (ref 4.8–10.8)

## 2019-06-08 PROCEDURE — 71250 CT THORAX DX C-: CPT | Mod: 26

## 2019-06-08 PROCEDURE — 99233 SBSQ HOSP IP/OBS HIGH 50: CPT

## 2019-06-08 RX ORDER — CEFEPIME 1 G/1
2000 INJECTION, POWDER, FOR SOLUTION INTRAMUSCULAR; INTRAVENOUS EVERY 8 HOURS
Refills: 0 | Status: DISCONTINUED | OUTPATIENT
Start: 2019-06-08 | End: 2019-06-09

## 2019-06-08 RX ORDER — MAGNESIUM SULFATE 500 MG/ML
2 VIAL (ML) INJECTION ONCE
Refills: 0 | Status: COMPLETED | OUTPATIENT
Start: 2019-06-08 | End: 2019-06-08

## 2019-06-08 RX ADMIN — CEFEPIME 100 MILLIGRAM(S): 1 INJECTION, POWDER, FOR SOLUTION INTRAMUSCULAR; INTRAVENOUS at 21:12

## 2019-06-08 RX ADMIN — Medication 50 GRAM(S): at 22:00

## 2019-06-08 RX ADMIN — Medication 325 MILLIGRAM(S): at 11:06

## 2019-06-08 RX ADMIN — HEPARIN SODIUM 5000 UNIT(S): 5000 INJECTION INTRAVENOUS; SUBCUTANEOUS at 05:31

## 2019-06-08 RX ADMIN — CHLORHEXIDINE GLUCONATE 1 APPLICATION(S): 213 SOLUTION TOPICAL at 05:27

## 2019-06-08 RX ADMIN — CEFEPIME 100 MILLIGRAM(S): 1 INJECTION, POWDER, FOR SOLUTION INTRAMUSCULAR; INTRAVENOUS at 14:04

## 2019-06-08 RX ADMIN — Medication 25 MILLIGRAM(S): at 17:00

## 2019-06-08 RX ADMIN — HEPARIN SODIUM 5000 UNIT(S): 5000 INJECTION INTRAVENOUS; SUBCUTANEOUS at 14:05

## 2019-06-08 RX ADMIN — Medication 25 MILLIGRAM(S): at 05:31

## 2019-06-08 RX ADMIN — HEPARIN SODIUM 5000 UNIT(S): 5000 INJECTION INTRAVENOUS; SUBCUTANEOUS at 21:12

## 2019-06-08 RX ADMIN — ATORVASTATIN CALCIUM 10 MILLIGRAM(S): 80 TABLET, FILM COATED ORAL at 21:12

## 2019-06-08 RX ADMIN — Medication 10 MILLIGRAM(S): at 05:31

## 2019-06-08 NOTE — DIETITIAN INITIAL EVALUATION ADULT. - PT NOT SOURCE
confused/disoriented, not responding to RD today. No emergency contact info in paper chart confused/disoriented, responded to some questions by nodding her head. No emergency contact info in paper chart.

## 2019-06-08 NOTE — PROGRESS NOTE ADULT - SUBJECTIVE AND OBJECTIVE BOX
SUBJECTIVE:    Patient is a 86y old Female who presents with a chief complaint of Urosepsis (08 Jun 2019 10:50)    Currently admitted to medicine with the primary diagnosis of UTI (urinary tract infection)     Today is hospital day 2d.   pt spiking fevers 101, 100.6    PAST MEDICAL & SURGICAL HISTORY  HLD (hyperlipidemia)  Diastolic dysfunction  HTN (hypertension)  History of cholecystectomy  H/O bilateral hip replacements: Right Hip ORIF on Xray        ALLERGIES:  No Known Allergies    MEDICATIONS:  STANDING MEDICATIONS  atorvastatin 10 milliGRAM(s) Oral at bedtime  cefepime   IVPB 2000 milliGRAM(s) IV Intermittent every 8 hours  chlorhexidine 4% Liquid 1 Application(s) Topical <User Schedule>  enalapril 10 milliGRAM(s) Oral daily  ferrous    sulfate 325 milliGRAM(s) Oral daily  heparin  Injectable 5000 Unit(s) SubCutaneous every 8 hours  magnesium sulfate  IVPB 2 Gram(s) IV Intermittent once  metoprolol tartrate 25 milliGRAM(s) Oral two times a day    PRN MEDICATIONS  acetaminophen   Tablet .. 650 milliGRAM(s) Oral every 6 hours PRN    VITALS:   T(F): 99.2  HR: 83  BP: 118/71  RR: 18  SpO2: 97%    LABS:                        10.9   4.21  )-----------( 132      ( 08 Jun 2019 07:22 )             35.0     06-08    141  |  109  |  24<H>  ----------------------------<  83  5.1<H>   |  26  |  0.8    Ca    8.2<L>      08 Jun 2019 07:22  Phos  2.5     06-07  Mg     1.6     06-07    TPro  5.0<L>  /  Alb  2.6<L>  /  TBili  0.3  /  DBili  x   /  AST  46<H>  /  ALT  28  /  AlkPhos  50  06-08              Culture - Urine (collected 06 Jun 2019 16:45)  Source: .Urine Clean Catch (Midstream)  Final Report (07 Jun 2019 22:56):    <10,000 CFU/mL Normal Urogenital Meghana    Culture - Blood (collected 06 Jun 2019 14:54)  Source: .Blood Blood-Peripheral  Preliminary Report (08 Jun 2019 02:01):    No growth to date.    Culture - Blood (collected 06 Jun 2019 14:54)  Source: .Blood Blood-Peripheral  Preliminary Report (08 Jun 2019 02:01):    No growth to date.      CARDIAC MARKERS ( 07 Jun 2019 06:53 )  x     / 0.02 ng/mL / x     / x     / x          RADIOLOGY:      EXAM:  US ABDOMEN LIMITED            PROCEDURE DATE:  06/07/2019            INTERPRETATION:  Clinical History / Reason for exam: Pain    Right upper quadrant sonogram.    Comparison: September 13, 2017   ..    The elongated homogeneous liver measures 19.1 cm in length x 14.3 cm AP   with no focal mass or dilatation of the intrahepatic bile ducts.    Nonvisualization of the gallbladder. Nondilated common bile duct 0.46 cm.       Pancreatic head and body regions are unremarkable. The tail region is   obscured by bowel gas.    The right kidney measures 10 cm in length containing an upper pole 3 cm   cyst. No right hydronephrosis, solid mass, stone or perinephric fluid   collection. Right renal vascular flow    Proximal abdominal aorta and inferior vena cava are unremarkable.    Right pleural effusion. No right upper quadrant ascites..    Impression:    1.  Nonvisualization of the gallbladder. Nondilated common bile duct.    2.  Right renal 3 cm cyst enlarged from 1.7 cm..    3.  Right pleural effusion.           SHIVA DOE M.D., ATTENDING RADIOLOGIST  This document has been electronically signed. Jun 8 2019 10:17AM      PHYSICAL EXAM:  GEN: No acute distress  LUNGS: Clear to auscultation bilaterally   HEART: S1/S2 present. RRR.   ABD: Soft, non-tender, non-distended. Bowel sounds present  EXT: Skin Intact.   NEURO: AAOX1

## 2019-06-08 NOTE — DIETITIAN INITIAL EVALUATION ADULT. - OTHER INFO
Initial assessment for low BMI/pressure ulcer. P/w: Initial assessment for low BMI/pressure ulcer. P/w: Urosepsis. Sepsis 2/2 pyelonephritis. On abx. HTN- on meds. DLD- on statin.

## 2019-06-08 NOTE — DIETITIAN INITIAL EVALUATION ADULT. - PHYSICAL APPEARANCE
BMI 18.0, confused/disoriented, skin: pressure ulcer stg II to L buttocks BMI 18.0, confused/disoriented, skin: pressure ulcer stg II to L buttocks, some tempral muscle wasting noted, but unable to perform full physical assessment today. Bed scale weight taken by RD today 53kg. No UBW available.

## 2019-06-08 NOTE — PROGRESS NOTE ADULT - ASSESSMENT
85 yo F with PMH HTN, DLD, active smoker presented to ED from home after patient was noted to have worsening confusion, lethargy, and fever (T=101) at home. In ED, UA was +ve.    #) Sepsis on admission - unclear source, possibly 2/2 UTI  - WBC=14k, T=101, UA +ve on admission  - WBC improving.  - Blood cx neg x 2; ucx neg  - change rocephin to cefepime as pt still spiking fevers  - Tylenol PRN for fever    #) HTN - c/w Enalapril 10, Lopressor 25    #) DLD - c/w statin    DVT ppx: Heparin subQ  Diet: DASH  Activity: OOBTC  Code status: FULL 85 yo F with PMH HTN, DLD, active smoker presented to ED from home after patient was noted to have worsening confusion, lethargy, and fever (T=101) at home. In ED, UA was +ve.    #) Sepsis on admission - unclear source, possibly 2/2 UTI  - WBC=14k, T=101, UA +ve on admission  - WBC improving.  - Blood cx neg x 2; ucx neg  - change rocephin to cefepime as pt still spiking fevers  - Tylenol PRN for fever  - CT chest ordered as per Dr Higginbotham     #) HTN - c/w Enalapril 10, Lopressor 25    #) DLD - c/w statin    DVT ppx: Heparin subQ  Diet: DASH  Activity: OOBTC  Code status: FULL

## 2019-06-08 NOTE — PROGRESS NOTE ADULT - ASSESSMENT
86F PMHx HTN, HLD here with SEPSIS PRESENT ON ADMISSION, unknown source.    1. SEPSIS PRESENT ON ADMISSION  unclear source  ucx, bcx neg  febrile on ceftriaxone, change to cefepime  check rvp  cxr unable to eval L base  check CT chest  confirm baseline mental status with family  2. HTN  enalapril 10  lopressor 25 bid  3. HLD  lipitor 10  4. DVT ppx  subq hep    #Progress Note Handoff:  Pending (specify):  Consults_________, Tests________, Test Results_______, Other__ct chest_______  Family discussion:to reach out to family  Disposition: Home___/SNF___/Other________/Unknown at this time_____x___ .

## 2019-06-08 NOTE — PROGRESS NOTE ADULT - SUBJECTIVE AND OBJECTIVE BOX
Patient is a 86y old  Female who presents with a chief complaint of Urosepsis (2019 12:40)      SUBJECTIVE / OVERNIGHT EVENTS:  unable to obtain ros    MEDICATIONS  (STANDING):  atorvastatin 10 milliGRAM(s) Oral at bedtime  cefepime   IVPB 2000 milliGRAM(s) IV Intermittent every 8 hours  chlorhexidine 4% Liquid 1 Application(s) Topical <User Schedule>  enalapril 10 milliGRAM(s) Oral daily  ferrous    sulfate 325 milliGRAM(s) Oral daily  heparin  Injectable 5000 Unit(s) SubCutaneous every 8 hours  metoprolol tartrate 25 milliGRAM(s) Oral two times a day    MEDICATIONS  (PRN):  acetaminophen   Tablet .. 650 milliGRAM(s) Oral every 6 hours PRN Temp greater or equal to 38.5C (101.3F)        CAPILLARY BLOOD GLUCOSE        I&O's Summary    2019 07:01  -  2019 10:50  --------------------------------------------------------  IN: 100 mL / OUT: 0 mL / NET: 100 mL        PHYSICAL EXAM:  GENERAL: nad, resting comfortably in bed  EYES:  NECK:   CHEST/LUNG: ctab no w/r/r  HEART: rrr no m/g/r  ABDOMEN: soft nt nd  EXTREMITIES:  no edema bl  PSYCH:   NEUROLOGY:   SKIN:    LABS:                        10.9   4.21  )-----------( 132      ( 2019 07:22 )             35.0     06-08    141  |  109  |  24<H>  ----------------------------<  83  5.1<H>   |  26  |  0.8    Ca    8.2<L>      2019 07:22  Phos  2.5     06-07  Mg     1.6     06-07    TPro  5.0<L>  /  Alb  2.6<L>  /  TBili  0.3  /  DBili  x   /  AST  46<H>  /  ALT  28  /  AlkPhos  50  06-08    PT/INR - ( 2019 14:54 )   PT: 11.40 sec;   INR: 0.99 ratio         PTT - ( 2019 14:54 )  PTT:33.3 sec  CARDIAC MARKERS ( 2019 06:53 )  x     / 0.02 ng/mL / x     / x     / x      CARDIAC MARKERS ( 2019 14:54 )  x     / 0.03 ng/mL / x     / x     / x          Urinalysis Basic - ( 2019 16:45 )    Color: Dark Yellow / Appearance: Cloudy / S.020 / pH: x  Gluc: x / Ketone: Negative  / Bili: Negative / Urobili: 0.2 mg/dL   Blood: x / Protein: Negative mg/dL / Nitrite: Positive   Leuk Esterase: Small / RBC: x / WBC 6-10 /HPF   Sq Epi: x / Non Sq Epi: Few /HPF / Bacteria: Many /HPF        RADIOLOGY & ADDITIONAL TESTS:    Imaging Personally Reviewed:  Yes    Consultant(s) Notes Reviewed:      Care Discussed with Consultants/Other Providers:    Assessment and Plan   PAST MEDICAL & SURGICAL HISTORY:  HLD (hyperlipidemia)  Diastolic dysfunction  HTN (hypertension)  History of cholecystectomy  H/O bilateral hip replacements: Right Hip ORIF on Xray

## 2019-06-09 LAB
ALBUMIN SERPL ELPH-MCNC: 2.6 G/DL — LOW (ref 3.5–5.2)
ALP SERPL-CCNC: 60 U/L — SIGNIFICANT CHANGE UP (ref 30–115)
ALT FLD-CCNC: 31 U/L — SIGNIFICANT CHANGE UP (ref 0–41)
ANION GAP SERPL CALC-SCNC: 7 MMOL/L — SIGNIFICANT CHANGE UP (ref 7–14)
AST SERPL-CCNC: 34 U/L — SIGNIFICANT CHANGE UP (ref 0–41)
BASOPHILS # BLD AUTO: 0.01 K/UL — SIGNIFICANT CHANGE UP (ref 0–0.2)
BASOPHILS NFR BLD AUTO: 0.3 % — SIGNIFICANT CHANGE UP (ref 0–1)
BILIRUB SERPL-MCNC: 0.3 MG/DL — SIGNIFICANT CHANGE UP (ref 0.2–1.2)
BUN SERPL-MCNC: 19 MG/DL — SIGNIFICANT CHANGE UP (ref 10–20)
CALCIUM SERPL-MCNC: 8.7 MG/DL — SIGNIFICANT CHANGE UP (ref 8.5–10.1)
CHLORIDE SERPL-SCNC: 107 MMOL/L — SIGNIFICANT CHANGE UP (ref 98–110)
CO2 SERPL-SCNC: 28 MMOL/L — SIGNIFICANT CHANGE UP (ref 17–32)
CREAT SERPL-MCNC: 0.6 MG/DL — LOW (ref 0.7–1.5)
EOSINOPHIL # BLD AUTO: 0.03 K/UL — SIGNIFICANT CHANGE UP (ref 0–0.7)
EOSINOPHIL NFR BLD AUTO: 0.8 % — SIGNIFICANT CHANGE UP (ref 0–8)
GLUCOSE SERPL-MCNC: 92 MG/DL — SIGNIFICANT CHANGE UP (ref 70–99)
HCT VFR BLD CALC: 35.7 % — LOW (ref 37–47)
HGB BLD-MCNC: 11.3 G/DL — LOW (ref 12–16)
IMM GRANULOCYTES NFR BLD AUTO: 1.1 % — HIGH (ref 0.1–0.3)
LYMPHOCYTES # BLD AUTO: 0.85 K/UL — LOW (ref 1.2–3.4)
LYMPHOCYTES # BLD AUTO: 23.5 % — SIGNIFICANT CHANGE UP (ref 20.5–51.1)
MAGNESIUM SERPL-MCNC: 2.2 MG/DL — SIGNIFICANT CHANGE UP (ref 1.8–2.4)
MCHC RBC-ENTMCNC: 29.5 PG — SIGNIFICANT CHANGE UP (ref 27–31)
MCHC RBC-ENTMCNC: 31.7 G/DL — LOW (ref 32–37)
MCV RBC AUTO: 93.2 FL — SIGNIFICANT CHANGE UP (ref 81–99)
MONOCYTES # BLD AUTO: 0.22 K/UL — SIGNIFICANT CHANGE UP (ref 0.1–0.6)
MONOCYTES NFR BLD AUTO: 6.1 % — SIGNIFICANT CHANGE UP (ref 1.7–9.3)
NEUTROPHILS # BLD AUTO: 2.47 K/UL — SIGNIFICANT CHANGE UP (ref 1.4–6.5)
NEUTROPHILS NFR BLD AUTO: 68.2 % — SIGNIFICANT CHANGE UP (ref 42.2–75.2)
NRBC # BLD: 0 /100 WBCS — SIGNIFICANT CHANGE UP (ref 0–0)
PLATELET # BLD AUTO: 124 K/UL — LOW (ref 130–400)
POTASSIUM SERPL-MCNC: 4.5 MMOL/L — SIGNIFICANT CHANGE UP (ref 3.5–5)
POTASSIUM SERPL-SCNC: 4.5 MMOL/L — SIGNIFICANT CHANGE UP (ref 3.5–5)
PROT SERPL-MCNC: 5 G/DL — LOW (ref 6–8)
RBC # BLD: 3.83 M/UL — LOW (ref 4.2–5.4)
RBC # FLD: 13.7 % — SIGNIFICANT CHANGE UP (ref 11.5–14.5)
SODIUM SERPL-SCNC: 142 MMOL/L — SIGNIFICANT CHANGE UP (ref 135–146)
WBC # BLD: 3.62 K/UL — LOW (ref 4.8–10.8)
WBC # FLD AUTO: 3.62 K/UL — LOW (ref 4.8–10.8)

## 2019-06-09 PROCEDURE — 99223 1ST HOSP IP/OBS HIGH 75: CPT

## 2019-06-09 RX ADMIN — HEPARIN SODIUM 5000 UNIT(S): 5000 INJECTION INTRAVENOUS; SUBCUTANEOUS at 22:14

## 2019-06-09 RX ADMIN — Medication 325 MILLIGRAM(S): at 14:22

## 2019-06-09 RX ADMIN — Medication 10 MILLIGRAM(S): at 06:04

## 2019-06-09 RX ADMIN — CEFEPIME 100 MILLIGRAM(S): 1 INJECTION, POWDER, FOR SOLUTION INTRAMUSCULAR; INTRAVENOUS at 05:38

## 2019-06-09 RX ADMIN — Medication 25 MILLIGRAM(S): at 17:33

## 2019-06-09 RX ADMIN — Medication 25 MILLIGRAM(S): at 06:04

## 2019-06-09 RX ADMIN — ATORVASTATIN CALCIUM 10 MILLIGRAM(S): 80 TABLET, FILM COATED ORAL at 22:08

## 2019-06-09 RX ADMIN — Medication 1 TABLET(S): at 17:33

## 2019-06-09 RX ADMIN — HEPARIN SODIUM 5000 UNIT(S): 5000 INJECTION INTRAVENOUS; SUBCUTANEOUS at 14:22

## 2019-06-09 RX ADMIN — CHLORHEXIDINE GLUCONATE 1 APPLICATION(S): 213 SOLUTION TOPICAL at 05:37

## 2019-06-09 NOTE — PROGRESS NOTE ADULT - ASSESSMENT
86F PMHx HTN, HLD presents for evaluation of AMS, lethargy, fever. Pt found to be septic on admission    Sepsis   -preset on admission  -likely due to pneumonia  -CT chest shows LLL opacity with airbronchograms  -pt afebrile x 24 hrs on cefepmie  -speech and swallow eval for aspiration  -change antibiotics to PO as afebrile x 24hrs  -blood cultures negative  -NO EVIDENCE OF UTI-urine culture negative    Metabolic encephalopathy  -resolved  -likely due to PNA  -family states pt back to baseline    KATINA  -resolved    Pulmonary artery dilation  -outpt follow up    HTN  -c/w enalapril, lopressor    HLD  -c/w lipitor     DVT ppx      #Progress Note Handoff:  Pending (specify): speech and swallow, observation on oral antibiotics  Family discussion:d/w daughter and son at bedside. In agreement with plan as above  Disposition: Home___/SNF___/Other________/Unknown at this time_____x___ .

## 2019-06-09 NOTE — PROGRESS NOTE ADULT - SUBJECTIVE AND OBJECTIVE BOX
CHIEF COMPLAINT:    Patient is a 86y old  Female who presents with a chief complaint of fever and lethargy.    INTERVAL HPI/OVERNIGHT EVENTS:    Patient seen and examined at bedside. No acute overnight events occurred.    ROS: Denies cough, SOB, abdominal pain. All other systems are negative.    Vital Signs:    T(F): 97 (06-09-19 @ 06:02), Max: 99.2 (06-08-19 @ 19:56)  HR: 77 (06-09-19 @ 06:02) (77 - 89)  BP: 147/65 (06-09-19 @ 06:02) (118/71 - 147/65)  RR: 18 (06-09-19 @ 06:02) (18 - 18)  SpO2: --  I&O's Summary    08 Jun 2019 07:01  -  09 Jun 2019 07:00  --------------------------------------------------------  IN: 210 mL / OUT: 0 mL / NET: 210 mL    PHYSICAL EXAM:  GENERAL:  NAD  SKIN: No rashes or lesions  HEENT: Atraumatic. Normocephalic. Anicteric  NECK:  No JVD.   PULMONARY: Decreased breathsounds LLL.  CVS: Normal S1, S2. Regular rate and rhythm. No murmurs.  ABDOMEN/GI: Soft, Nontender, Nondistended; Bowel sounds are present  EXTREMITIES:  No edema B/L LE.  NEUROLOGIC:  No motor deficit.  PSYCH: Calm affect, as per family at baseline.    LABS:                        11.3   3.62  )-----------( 124      ( 09 Jun 2019 07:01 )             35.7     06-09    142  |  107  |  19  ----------------------------<  92  4.5   |  28  |  0.6<L>    Ca    8.7      09 Jun 2019 07:01  Mg     2.2     06-09    TPro  5.0<L>  /  Alb  2.6<L>  /  TBili  0.3  /  DBili  x   /  AST  34  /  ALT  31  /  AlkPhos  60  06-09        Trop 0.02, CKMB --, CK --, 06-07-19 @ 06:53  Trop 0.03, CKMB --, CK --, 06-06-19 @ 14:54      Culture - Urine (collected 06 Jun 2019 16:45)  Source: .Urine Clean Catch (Midstream)  Final Report (07 Jun 2019 22:56):    <10,000 CFU/mL Normal Urogenital Meghana    Culture - Blood (collected 06 Jun 2019 14:54)  Source: .Blood Blood-Peripheral  Preliminary Report (08 Jun 2019 02:01):    No growth to date.    Culture - Blood (collected 06 Jun 2019 14:54)  Source: .Blood Blood-Peripheral  Preliminary Report (08 Jun 2019 02:01):    No growth to date.        RADIOLOGY & ADDITIONAL TESTS:  Imaging or report Personally Reviewed:  [x ] YES  [ ] NO  CT chest-LLL opacity      Medications:  Standing  atorvastatin 10 milliGRAM(s) Oral at bedtime  cefepime   IVPB 2000 milliGRAM(s) IV Intermittent every 8 hours  chlorhexidine 4% Liquid 1 Application(s) Topical <User Schedule>  enalapril 10 milliGRAM(s) Oral daily  ferrous    sulfate 325 milliGRAM(s) Oral daily  heparin  Injectable 5000 Unit(s) SubCutaneous every 8 hours  metoprolol tartrate 25 milliGRAM(s) Oral two times a day    PRN Meds  acetaminophen   Tablet .. 650 milliGRAM(s) Oral every 6 hours PRN

## 2019-06-09 NOTE — PHYSICAL THERAPY INITIAL EVALUATION ADULT - GENERAL OBSERVATIONS, REHAB EVAL
930-958 am Chart reviewed. Pt. seen semirecline in bed , in No apparent distress, + IV lock, Pt. agreed to activity/therapy.

## 2019-06-10 ENCOUNTER — TRANSCRIPTION ENCOUNTER (OUTPATIENT)
Age: 84
End: 2019-06-10

## 2019-06-10 VITALS
SYSTOLIC BLOOD PRESSURE: 159 MMHG | TEMPERATURE: 97 F | RESPIRATION RATE: 17 BRPM | HEART RATE: 76 BPM | DIASTOLIC BLOOD PRESSURE: 72 MMHG

## 2019-06-10 LAB
ALBUMIN SERPL ELPH-MCNC: 2.7 G/DL — LOW (ref 3.5–5.2)
ALP SERPL-CCNC: 62 U/L — SIGNIFICANT CHANGE UP (ref 30–115)
ALT FLD-CCNC: 24 U/L — SIGNIFICANT CHANGE UP (ref 0–41)
ANION GAP SERPL CALC-SCNC: 8 MMOL/L — SIGNIFICANT CHANGE UP (ref 7–14)
AST SERPL-CCNC: 20 U/L — SIGNIFICANT CHANGE UP (ref 0–41)
BASOPHILS # BLD AUTO: 0.02 K/UL — SIGNIFICANT CHANGE UP (ref 0–0.2)
BASOPHILS NFR BLD AUTO: 0.5 % — SIGNIFICANT CHANGE UP (ref 0–1)
BILIRUB SERPL-MCNC: 0.3 MG/DL — SIGNIFICANT CHANGE UP (ref 0.2–1.2)
BUN SERPL-MCNC: 15 MG/DL — SIGNIFICANT CHANGE UP (ref 10–20)
CALCIUM SERPL-MCNC: 9 MG/DL — SIGNIFICANT CHANGE UP (ref 8.5–10.1)
CHLORIDE SERPL-SCNC: 108 MMOL/L — SIGNIFICANT CHANGE UP (ref 98–110)
CO2 SERPL-SCNC: 28 MMOL/L — SIGNIFICANT CHANGE UP (ref 17–32)
CREAT SERPL-MCNC: 0.7 MG/DL — SIGNIFICANT CHANGE UP (ref 0.7–1.5)
EOSINOPHIL # BLD AUTO: 0.03 K/UL — SIGNIFICANT CHANGE UP (ref 0–0.7)
EOSINOPHIL NFR BLD AUTO: 0.7 % — SIGNIFICANT CHANGE UP (ref 0–8)
GLUCOSE SERPL-MCNC: 85 MG/DL — SIGNIFICANT CHANGE UP (ref 70–99)
HCT VFR BLD CALC: 36.3 % — LOW (ref 37–47)
HGB BLD-MCNC: 11.5 G/DL — LOW (ref 12–16)
IMM GRANULOCYTES NFR BLD AUTO: 1.4 % — HIGH (ref 0.1–0.3)
LYMPHOCYTES # BLD AUTO: 1.65 K/UL — SIGNIFICANT CHANGE UP (ref 1.2–3.4)
LYMPHOCYTES # BLD AUTO: 38.8 % — SIGNIFICANT CHANGE UP (ref 20.5–51.1)
MCHC RBC-ENTMCNC: 29.3 PG — SIGNIFICANT CHANGE UP (ref 27–31)
MCHC RBC-ENTMCNC: 31.7 G/DL — LOW (ref 32–37)
MCV RBC AUTO: 92.6 FL — SIGNIFICANT CHANGE UP (ref 81–99)
MONOCYTES # BLD AUTO: 0.27 K/UL — SIGNIFICANT CHANGE UP (ref 0.1–0.6)
MONOCYTES NFR BLD AUTO: 6.4 % — SIGNIFICANT CHANGE UP (ref 1.7–9.3)
NEUTROPHILS # BLD AUTO: 2.22 K/UL — SIGNIFICANT CHANGE UP (ref 1.4–6.5)
NEUTROPHILS NFR BLD AUTO: 52.2 % — SIGNIFICANT CHANGE UP (ref 42.2–75.2)
NRBC # BLD: 0 /100 WBCS — SIGNIFICANT CHANGE UP (ref 0–0)
PLATELET # BLD AUTO: 142 K/UL — SIGNIFICANT CHANGE UP (ref 130–400)
POTASSIUM SERPL-MCNC: 4.7 MMOL/L — SIGNIFICANT CHANGE UP (ref 3.5–5)
POTASSIUM SERPL-SCNC: 4.7 MMOL/L — SIGNIFICANT CHANGE UP (ref 3.5–5)
PROT SERPL-MCNC: 4.9 G/DL — LOW (ref 6–8)
RBC # BLD: 3.92 M/UL — LOW (ref 4.2–5.4)
RBC # FLD: 13.7 % — SIGNIFICANT CHANGE UP (ref 11.5–14.5)
SODIUM SERPL-SCNC: 144 MMOL/L — SIGNIFICANT CHANGE UP (ref 135–146)
WBC # BLD: 4.25 K/UL — LOW (ref 4.8–10.8)
WBC # FLD AUTO: 4.25 K/UL — LOW (ref 4.8–10.8)

## 2019-06-10 PROCEDURE — 99238 HOSP IP/OBS DSCHRG MGMT 30/<: CPT

## 2019-06-10 RX ADMIN — Medication 1 TABLET(S): at 17:02

## 2019-06-10 RX ADMIN — Medication 25 MILLIGRAM(S): at 05:22

## 2019-06-10 RX ADMIN — CHLORHEXIDINE GLUCONATE 1 APPLICATION(S): 213 SOLUTION TOPICAL at 05:22

## 2019-06-10 RX ADMIN — Medication 325 MILLIGRAM(S): at 11:21

## 2019-06-10 RX ADMIN — HEPARIN SODIUM 5000 UNIT(S): 5000 INJECTION INTRAVENOUS; SUBCUTANEOUS at 14:10

## 2019-06-10 RX ADMIN — Medication 25 MILLIGRAM(S): at 17:02

## 2019-06-10 RX ADMIN — Medication 10 MILLIGRAM(S): at 05:22

## 2019-06-10 RX ADMIN — Medication 1 TABLET(S): at 05:22

## 2019-06-10 NOTE — PROGRESS NOTE ADULT - ATTENDING COMMENTS
Agree with above. Patient switched to Augmentin yesterday for suspected aspiration. Family states she often coughs while she eats. Remains afebrile on Augmentin. Awaiting speech and swallow eval. Pending evaluation pt can be d/c to home with HHA. Family declining SNF.

## 2019-06-10 NOTE — DISCHARGE NOTE PROVIDER - CARE PROVIDER_API CALL
Jes Deal)  Internal Medicine; Pulmonary Disease  [address] [place] [address], [address] [place] [address]  Phone: [phone]  Fax: [phone]  Follow Up Time:

## 2019-06-10 NOTE — DISCHARGE NOTE NURSING/CASE MANAGEMENT/SOCIAL WORK - NSDCDPATPORTLINK_GEN_ALL_CORE
You can access the [facility] Portal, offered by [facility], by registering with the following website: [email or web address]

## 2019-06-10 NOTE — CHART NOTE - NSCHARTNOTEFT_GEN_A_CORE
Registered Dietitian Follow-Up     Patient Profile Reviewed                           Yes [x]   No []     Nutrition History Previously Obtained        Yes []  No [x] Confused; disoriented, when asked to recall info only provides 1 word response. Unable to reach emergency contact info in paper chart.     Pertinent Subjective Information:  -Pt sitting upright in bed at time of assessment. Observed untouched lunch tray at time of assessment. As per RN, pt demonstrated difficulty swallowing at breakfast today so held lunch until SLP assessment. RD assessed pt after SLP who recommends dysphagia III, nectar thick liquids but pt has not yet received modified meal. Previous RD supplement recs for ensure compact remain pending, recommend adjusting to ProSource Gelatin and Ensure pudding with new recs for nectar thick fluid. Recs at end of document d/w LIP.      Pertinent Medical Interventions:  (1) LLL PNA, possibly 2/2 PNA  (2) Sepsis, POA  --blood cultures negative X2. Leukocytosis resolved   --CT chest shows LLL opacity with air bronchograms  --afebrile x48hrs     Diet order: DASH/TLC (SLP recs not yet ordered)      Anthropometrics:  - Ht. 162.5cm   - Wt. 47.5kg (6/6), no new wt   - BMI 18  - IBW     Pertinent Lab Data: (6/10/19) WBC 4.25, RBC 3.92, H/H 11.5/36.3     Pertinent Meds: heparin, augmentin, atorvastatin, ferrous sulfate, metoprolol      Physical Findings:  - Appearance: confused, disoriented; mild temporal muscle wasting observed but unable to complete full exam as no pt consent   - GI function: none reported by pt +BM 6/9  - Tubes:  - Oral/Mouth cavity: SLP 6/10 recs: dysphagia III mechanical soft, nectar thick liquids   - Skin: stage II L buttocks; no edema noted       Nutrition Requirements  Weight Used: 47.5kg admit wt      Estimated Needs Continued from 6/8   calorie 1087-1178kcal (MSJ z 1.2-1.3 AF)  protein 47-57g (1-1.2g/kg CBW)  fluid 1ml/kcal or per LIP     Nutrient Intake: not meeting needs with <50% PO intake      [x] Previous Nutrition Diagnosis: inadequate oral intake             [x] Ongoing          [] Resolved     Nutrition Intervention: meals and snacks, coordination of care, medical food supplements  Recommend:  1. Add Prosource Gelatin q24H and ensure pudding q12H.  2. Continue diet order per SLP.  3. Consider adding daily multivitamin.      Goal/Expected Outcome: Pt to tolerate at least 50% meals and supplements upon f/u in 3 days.      Indicator/Monitoring: RD will monitor diet order, energy intake, nutrition related labs, NFPF

## 2019-06-10 NOTE — CHART NOTE - NSCHARTNOTEFT_GEN_A_CORE
<<<RESIDENT DISCHARGE NOTE>>>     KYRA MENDIOLA  [id number]    VITAL SIGNS:  T(F): 97 (06-10-19 @ 13:23), Max: 98.4 (06-09-19 @ 19:51)  HR: 76 (06-10-19 @ 13:23)  BP: 159/72 (06-10-19 @ 13:23)  SpO2: 92% (06-10-19 @ 07:46)      PHYSICAL EXAMINATION:  GENERAL: NAD, speaks in full sentences, no signs of respiratory distress  HEAD: Atraumatic  NECK: Supple  CHEST/LUNG: Clear to auscultation bilaterally; No wheeze or crackles. Decreased breath sounds LLL  HEART: S1, S2; RRR; No murmurs, rubs, or gallops  ABDOMEN: BS+; Soft, Non-tender, Non-distended  EXTREMITIES:  2+ Peripheral Pulses, No clubbing, cyanosis, or edema  PSYCH: AAOx0  NEUROLOGY: non-focal  SKIN: No rashes or lesions    TEST RESULTS:                        11.5   4.25  )-----------( 142      ( 10 Eduardo 2019 07:00 )             36.3       06-10    144  |  108  |  15  ----------------------------<  85  4.7   |  28  |  0.7    Ca    9.0      10 Eduardo 2019 07:00  Mg     2.2     06-09    TPro  4.9<L>  /  Alb  2.7<L>  /  TBili  0.3  /  DBili  x   /  AST  20  /  ALT  24  /  AlkPhos  62  06-10      FINAL DISCHARGE INTERVIEW:  Resident(s) Present: (Name: Dr Jensen), RN Present: (Name: Maribel)    DISCHARGE MEDICATION RECONCILIATION  reviewed with Attending (Name: Dr Owusu)    DISPOSITION:   [ X ] Home,    [  ] Home with Visiting Nursing Services,   [    ]  SNF/ NH,    [   ] Acute Rehab (4A),   [   ] Other (Specify:_________)

## 2019-06-10 NOTE — DISCHARGE NOTE PROVIDER - HOSPITAL COURSE
87 yo F with PMH HTN, DLD, active smoker presented to ED from home after family noticed patient was unsteady on feet and having worsening confusion, lethargy, and fever (T=101) at home. Collateral history obtained from daughter at bedside. Report that patient had LE erythema noted day PTP. Went to PCP office, was given course of Bactrim. Took 2 doses of Bactirm. Stated LE erythema improved but patient was still unsteady, confused, and had a fever. Also had 1 episode of NBNB emesis (after taking Bactrim on empty stomach). Denies any dysuria, frequency, urgency, abdominal pain, diarrhea, chest pain, SOB, abdominal pain.        Daughter states patient has been having decreased PO intake for past couple of weeks. Discussed with PCP - said she only lost 1 lb so was not concerning. Goes to [facility] during 8-2, has HHA from 8-4. Is continent of urine and stool, wears diapers around the clock at home. Ambulatory with walker. Feeds herself although cannot bath herself. Lives at home with daughter.        During hospitalization, CXR and CT chest was consistent with pneumonia. Patient was treated with IV antibiotics. Was switched to PO and remained afebrile for 48 hours prior to discharge. To be discharged with close outpatient follow up.

## 2019-06-10 NOTE — DISCHARGE NOTE PROVIDER - NSDCCPCAREPLAN_GEN_ALL_CORE_FT
PRINCIPAL DISCHARGE DIAGNOSIS  Diagnosis: Aspiration pneumonia  Assessment and Plan of Treatment: - take all medication as prescribed. Complete course of antibiotics  - follow up with primary care doctor within 1 week of discharge  - remain upright while eating (do not lie down)  - return to ER if symptoms persist or worsen.      SECONDARY DISCHARGE DIAGNOSES  Diagnosis: Dyslipidemia  Assessment and Plan of Treatment: - take all medication as prescribed  - follow up with primary care doctor within 1 week of discharge    Diagnosis: Hypertension  Assessment and Plan of Treatment: - take all medication as prescribed  - follow up with primary care doctor within 1 week of discharge PRINCIPAL DISCHARGE DIAGNOSIS  Diagnosis: Aspiration pneumonia  Assessment and Plan of Treatment: - take all medication as prescribed. Complete course of antibiotics  - follow up with primary care doctor within 1 week of discharge  - remain upright while eating (do not lie down)  - return to ER if symptoms persist or worsen.      SECONDARY DISCHARGE DIAGNOSES  Diagnosis: Dyslipidemia  Assessment and Plan of Treatment: - take all medication as prescribed  - follow up with primary care doctor within 1 week of discharge    Diagnosis: Hypertension  Assessment and Plan of Treatment: - take all medication as prescribed  - monitor blood pressure daily  - follow up with primary care doctor within 1 week of discharge

## 2019-06-10 NOTE — SWALLOW BEDSIDE ASSESSMENT ADULT - SWALLOW EVAL: DIAGNOSIS
+toleration of Nectar-thickened liquids, puree, Dysphagia Diet II mechanical soft consistency with ground meat. mild oral dysphagia for Dysphagia Diet III Mechanical soft consistency with cut up meats, moderate oral dysphagia for regular consistency, +cough post thins

## 2019-06-10 NOTE — SWALLOW BEDSIDE ASSESSMENT ADULT - SLP PERTINENT HISTORY OF CURRENT PROBLEM
87 yo F with PMH HTN, DLD, active smoker presented to ED from home after family noticed patient was unsteady on feet and having worsening confusion, lethargy, and fever (T=101) at home

## 2019-06-10 NOTE — PROGRESS NOTE ADULT - SUBJECTIVE AND OBJECTIVE BOX
SUBJECTIVE:    Patient is a 87 y/o Female who presents with a chief complaint of AMS.     Currently admitted to medicine with the primary diagnosis of LLL pneumonia.      Today is hospital day 4. Patient was seen and examined at bedside. This morning she is resting comfortably in bed. No overnight events.     PAST MEDICAL & SURGICAL HISTORY  PAST MEDICAL & SURGICAL HISTORY:  HLD (hyperlipidemia)  Diastolic dysfunction  HTN (hypertension)  History of cholecystectomy  H/O bilateral hip replacements: Right Hip ORIF on Xray    SOCIAL HISTORY:  Active smoker ~4 cig / day. Has been smoking all of adult life  Denies alcohol or illicit drug use  Lives at home with daughter  Ambulatory with walker  Has HHA 8-4    ALLERGIES:  No Known Allergies    MEDICATIONS:  STANDING MEDICATIONS  amoxicillin  875 milliGRAM(s)/clavulanate 1 Tablet(s) Oral two times a day  atorvastatin 10 milliGRAM(s) Oral at bedtime  chlorhexidine 4% Liquid 1 Application(s) Topical <User Schedule>  enalapril 10 milliGRAM(s) Oral daily  ferrous    sulfate 325 milliGRAM(s) Oral daily  heparin  Injectable 5000 Unit(s) SubCutaneous every 8 hours  metoprolol tartrate 25 milliGRAM(s) Oral two times a day    PRN MEDICATIONS  acetaminophen   Tablet .. 650 milliGRAM(s) Oral every 6 hours PRN    VITALS:   T(F): 97.8  HR: 72  BP: 146/65  RR: 18  SpO2: 92%    LABS:                        11.5   4.25  )-----------( 142      ( 10 Eduardo 2019 07:00 )             36.3     06-10    144  |  108  |  15  ----------------------------<  85  4.7   |  28  |  0.7    Ca    9.0      10 Eduardo 2019 07:00  Mg     2.2     06-09    TPro  4.9<L>  /  Alb  2.7<L>  /  TBili  0.3  /  DBili  x   /  AST  20  /  ALT  24  /  AlkPhos  62  06-10                  RADIOLOGY:  < from: CT Chest No Cont (06.08.19 @ 18:57) >  IMPRESSION:  1. Left lower lobe airspace consolidation, which may represent pneumonia   in the proper clinical setting.. Follow-up CT chest in 2-3 months   recommended to document resolution and exclude central obstructing mass.    2. Occlusion of left lower lobar bronchus; correlate for aspiration.    3. Dilated main pulmonary artery at 3.8 cm, which can be attributed to   pulmonary arterial hypertension.     4. Centrilobular emphysema.        < end of copied text >      PHYSICAL EXAM:  GENERAL: NAD, speaks in full sentences, no signs of respiratory distress  HEAD: Atraumatic  NECK: Supple  CHEST/LUNG: Clear to auscultation bilaterally; No wheeze or crackles. Decreased breath sounds LLL  HEART: S1, S2; RRR; No murmurs, rubs, or gallops  ABDOMEN: BS+; Soft, Non-tender, Non-distended  EXTREMITIES:  2+ Peripheral Pulses, No clubbing, cyanosis, or edema  PSYCH: AAOx0  NEUROLOGY: non-focal  SKIN: No rashes or lesions SUBJECTIVE:    Patient is a 85 y/o Female who presents with a chief complaint of AMS.     Currently admitted to medicine with the primary diagnosis of LLL pneumonia.      Today is hospital day 4. Patient was seen and examined at bedside. This morning she is resting comfortably in bed. No overnight events.     PAST MEDICAL & SURGICAL HISTORY  PAST MEDICAL & SURGICAL HISTORY:  HLD (hyperlipidemia)  Diastolic dysfunction  HTN (hypertension)  History of cholecystectomy  H/O bilateral hip replacements: Right Hip ORIF on Xray    SOCIAL HISTORY:  Active smoker ~4 cig / day. Has been smoking all of adult life  Denies alcohol or illicit drug use  Lives at home with daughter  Ambulatory with walker  Has HHA 8-4    ALLERGIES:  No Known Allergies    MEDICATIONS:  STANDING MEDICATIONS  amoxicillin  875 milliGRAM(s)/clavulanate 1 Tablet(s) Oral two times a day  atorvastatin 10 milliGRAM(s) Oral at bedtime  chlorhexidine 4% Liquid 1 Application(s) Topical <User Schedule>  enalapril 10 milliGRAM(s) Oral daily  ferrous    sulfate 325 milliGRAM(s) Oral daily  heparin  Injectable 5000 Unit(s) SubCutaneous every 8 hours  metoprolol tartrate 25 milliGRAM(s) Oral two times a day    PRN MEDICATIONS  acetaminophen   Tablet .. 650 milliGRAM(s) Oral every 6 hours PRN    VITALS:   T(F): 97.8  HR: 72  BP: 146/65  RR: 18  SpO2: 92%    LABS:                        11.5   4.25  )-----------( 142      ( 10 Eduardo 2019 07:00 )             36.3     06-10    144  |  108  |  15  ----------------------------<  85  4.7   |  28  |  0.7    Ca    9.0      10 Eduardo 2019 07:00  Mg     2.2     06-09    TPro  4.9<L>  /  Alb  2.7<L>  /  TBili  0.3  /  DBili  x   /  AST  20  /  ALT  24  /  AlkPhos  62  06-10      RADIOLOGY:  < from: CT Chest No Cont (06.08.19 @ 18:57) >  IMPRESSION:  1. Left lower lobe airspace consolidation, which may represent pneumonia   in the proper clinical setting.. Follow-up CT chest in 2-3 months   recommended to document resolution and exclude central obstructing mass.    2. Occlusion of left lower lobar bronchus; correlate for aspiration.    3. Dilated main pulmonary artery at 3.8 cm, which can be attributed to   pulmonary arterial hypertension.     4. Centrilobular emphysema.        < end of copied text >      PHYSICAL EXAM:  GENERAL: NAD, speaks in full sentences, no signs of respiratory distress  HEAD: Atraumatic  NECK: Supple  CHEST/LUNG: Clear to auscultation bilaterally; No wheeze or crackles. Decreased breath sounds LLL  HEART: S1, S2; RRR; No murmurs, rubs, or gallops  ABDOMEN: BS+; Soft, Non-tender, Non-distended  EXTREMITIES:  2+ Peripheral Pulses, No clubbing, cyanosis, or edema  PSYCH: AAOx0  NEUROLOGY: non-focal  SKIN: No rashes or lesions

## 2019-06-10 NOTE — PROGRESS NOTE ADULT - ASSESSMENT
87 y/o F with PMH HTN, DLD, active smoker presented to ED from home after patient was noted to have worsening confusion, lethargy, and fever (T=101) at home.     # LLL pneumonia - possibly secondary to aspiration (although left lobe)  # Sepsis preset on admission  - blood cultures negative X2. Leukocytosis resolved   - NO EVIDENCE OF UTI - urine culture negative  - CT chest shows LLL opacity with air bronchograms  - pt afebrile over past 48 hours. Now on PO antibiotics  - f/u speech and swallow eval for aspiration    # HTN   - c/w Enalapril 10, Lopressor 25    # DLD   - c/w statin    DVT ppx: Heparin subQ  Diet: DASH  Activity: OOBTC  Code status: FULL  Dispo: From home. Anticipate discharge within 24 hours (either home with home health aid or SNF).

## 2019-06-10 NOTE — SWALLOW BEDSIDE ASSESSMENT ADULT - ASR SWALLOW ASPIRATION MONITOR
position upright (89Y)/cough/gurgly voice/pneumonia/throat clearing/upper respiratory infection/fever

## 2019-06-13 DIAGNOSIS — R53.81 OTHER MALAISE: ICD-10-CM

## 2019-06-13 DIAGNOSIS — R50.9 FEVER, UNSPECIFIED: ICD-10-CM

## 2019-06-13 DIAGNOSIS — F17.210 NICOTINE DEPENDENCE, CIGARETTES, UNCOMPLICATED: ICD-10-CM

## 2019-06-13 DIAGNOSIS — A41.9 SEPSIS, UNSPECIFIED ORGANISM: ICD-10-CM

## 2019-06-13 DIAGNOSIS — J69.0 PNEUMONITIS DUE TO INHALATION OF FOOD AND VOMIT: ICD-10-CM

## 2019-06-13 DIAGNOSIS — J18.9 PNEUMONIA, UNSPECIFIED ORGANISM: ICD-10-CM

## 2019-06-13 DIAGNOSIS — I28.8 OTHER DISEASES OF PULMONARY VESSELS: ICD-10-CM

## 2019-06-13 DIAGNOSIS — N17.9 ACUTE KIDNEY FAILURE, UNSPECIFIED: ICD-10-CM

## 2019-06-13 DIAGNOSIS — Z96.643 PRESENCE OF ARTIFICIAL HIP JOINT, BILATERAL: ICD-10-CM

## 2019-06-13 DIAGNOSIS — E78.5 HYPERLIPIDEMIA, UNSPECIFIED: ICD-10-CM

## 2019-06-13 DIAGNOSIS — G93.41 METABOLIC ENCEPHALOPATHY: ICD-10-CM

## 2019-06-13 DIAGNOSIS — I10 ESSENTIAL (PRIMARY) HYPERTENSION: ICD-10-CM

## 2019-06-13 DIAGNOSIS — I51.9 HEART DISEASE, UNSPECIFIED: ICD-10-CM

## 2019-06-25 ENCOUNTER — INPATIENT (INPATIENT)
Facility: HOSPITAL | Age: 84
LOS: 17 days | Discharge: NURSING, REHAB OR LONG-TERM CARE | End: 2019-07-13
Attending: HOSPITALIST | Admitting: HOSPITALIST
Payer: MEDICARE

## 2019-06-25 VITALS
WEIGHT: 132.28 LBS | RESPIRATION RATE: 18 BRPM | TEMPERATURE: 100 F | DIASTOLIC BLOOD PRESSURE: 51 MMHG | SYSTOLIC BLOOD PRESSURE: 100 MMHG | HEIGHT: 60 IN | OXYGEN SATURATION: 97 % | HEART RATE: 88 BPM

## 2019-06-25 DIAGNOSIS — Z90.49 ACQUIRED ABSENCE OF OTHER SPECIFIED PARTS OF DIGESTIVE TRACT: Chronic | ICD-10-CM

## 2019-06-25 DIAGNOSIS — Z96.643 PRESENCE OF ARTIFICIAL HIP JOINT, BILATERAL: Chronic | ICD-10-CM

## 2019-06-25 PROBLEM — E78.5 HYPERLIPIDEMIA, UNSPECIFIED: Chronic | Status: ACTIVE | Noted: 2019-06-06

## 2019-06-25 LAB
ALBUMIN SERPL ELPH-MCNC: 3.4 G/DL — LOW (ref 3.5–5.2)
ALP SERPL-CCNC: 54 U/L — SIGNIFICANT CHANGE UP (ref 30–115)
ALT FLD-CCNC: 51 U/L — HIGH (ref 0–41)
ANION GAP SERPL CALC-SCNC: 14 MMOL/L — SIGNIFICANT CHANGE UP (ref 7–14)
APPEARANCE UR: ABNORMAL
AST SERPL-CCNC: 63 U/L — HIGH (ref 0–41)
BACTERIA # UR AUTO: ABNORMAL /HPF
BASE EXCESS BLDV CALC-SCNC: -1.2 MMOL/L — SIGNIFICANT CHANGE UP (ref -2–2)
BASE EXCESS BLDV CALC-SCNC: 0.7 MMOL/L — SIGNIFICANT CHANGE UP (ref -2–2)
BASOPHILS # BLD AUTO: 0.01 K/UL — SIGNIFICANT CHANGE UP (ref 0–0.2)
BASOPHILS NFR BLD AUTO: 0.1 % — SIGNIFICANT CHANGE UP (ref 0–1)
BILIRUB SERPL-MCNC: 0.6 MG/DL — SIGNIFICANT CHANGE UP (ref 0.2–1.2)
BILIRUB UR-MCNC: ABNORMAL
BUN SERPL-MCNC: 52 MG/DL — HIGH (ref 10–20)
CA-I SERPL-SCNC: 1.2 MMOL/L — SIGNIFICANT CHANGE UP (ref 1.12–1.3)
CA-I SERPL-SCNC: 1.23 MMOL/L — SIGNIFICANT CHANGE UP (ref 1.12–1.3)
CALCIUM SERPL-MCNC: 9.2 MG/DL — SIGNIFICANT CHANGE UP (ref 8.5–10.1)
CHLORIDE SERPL-SCNC: 102 MMOL/L — SIGNIFICANT CHANGE UP (ref 98–110)
CO2 SERPL-SCNC: 23 MMOL/L — SIGNIFICANT CHANGE UP (ref 17–32)
COLOR SPEC: SIGNIFICANT CHANGE UP
COMMENT - URINE: SIGNIFICANT CHANGE UP
CREAT SERPL-MCNC: 1.9 MG/DL — HIGH (ref 0.7–1.5)
DIFF PNL FLD: ABNORMAL
EOSINOPHIL # BLD AUTO: 0 K/UL — SIGNIFICANT CHANGE UP (ref 0–0.7)
EOSINOPHIL NFR BLD AUTO: 0 % — SIGNIFICANT CHANGE UP (ref 0–8)
GAS PNL BLDV: 138 MMOL/L — SIGNIFICANT CHANGE UP (ref 136–145)
GAS PNL BLDV: 140 MMOL/L — SIGNIFICANT CHANGE UP (ref 136–145)
GAS PNL BLDV: SIGNIFICANT CHANGE UP
GAS PNL BLDV: SIGNIFICANT CHANGE UP
GLUCOSE SERPL-MCNC: 97 MG/DL — SIGNIFICANT CHANGE UP (ref 70–99)
GLUCOSE UR QL: NEGATIVE MG/DL — SIGNIFICANT CHANGE UP
HCO3 BLDV-SCNC: 28 MMOL/L — SIGNIFICANT CHANGE UP (ref 22–29)
HCO3 BLDV-SCNC: 28 MMOL/L — SIGNIFICANT CHANGE UP (ref 22–29)
HCT VFR BLD CALC: 41.8 % — SIGNIFICANT CHANGE UP (ref 37–47)
HCT VFR BLDA CALC: 34.7 % — SIGNIFICANT CHANGE UP (ref 34–44)
HCT VFR BLDA CALC: 41.5 % — SIGNIFICANT CHANGE UP (ref 34–44)
HGB BLD CALC-MCNC: 11.3 G/DL — LOW (ref 14–18)
HGB BLD CALC-MCNC: 13.5 G/DL — LOW (ref 14–18)
HGB BLD-MCNC: 13.1 G/DL — SIGNIFICANT CHANGE UP (ref 12–16)
IMM GRANULOCYTES NFR BLD AUTO: 0.3 % — SIGNIFICANT CHANGE UP (ref 0.1–0.3)
KETONES UR-MCNC: NEGATIVE — SIGNIFICANT CHANGE UP
LACTATE BLDV-MCNC: 1.8 MMOL/L — HIGH (ref 0.5–1.6)
LACTATE BLDV-MCNC: 3.1 MMOL/L — HIGH (ref 0.5–1.6)
LACTATE SERPL-SCNC: 2.1 MMOL/L — SIGNIFICANT CHANGE UP (ref 0.5–2.2)
LEUKOCYTE ESTERASE UR-ACNC: ABNORMAL
LYMPHOCYTES # BLD AUTO: 0.61 K/UL — LOW (ref 1.2–3.4)
LYMPHOCYTES # BLD AUTO: 8 % — LOW (ref 20.5–51.1)
MCHC RBC-ENTMCNC: 29.9 PG — SIGNIFICANT CHANGE UP (ref 27–31)
MCHC RBC-ENTMCNC: 31.3 G/DL — LOW (ref 32–37)
MCV RBC AUTO: 95.4 FL — SIGNIFICANT CHANGE UP (ref 81–99)
MONOCYTES # BLD AUTO: 0.5 K/UL — SIGNIFICANT CHANGE UP (ref 0.1–0.6)
MONOCYTES NFR BLD AUTO: 6.5 % — SIGNIFICANT CHANGE UP (ref 1.7–9.3)
NEUTROPHILS # BLD AUTO: 6.51 K/UL — HIGH (ref 1.4–6.5)
NEUTROPHILS NFR BLD AUTO: 85.1 % — HIGH (ref 42.2–75.2)
NITRITE UR-MCNC: NEGATIVE — SIGNIFICANT CHANGE UP
NRBC # BLD: 0 /100 WBCS — SIGNIFICANT CHANGE UP (ref 0–0)
PCO2 BLDV: 53 MMHG — HIGH (ref 41–51)
PCO2 BLDV: 64 MMHG — HIGH (ref 41–51)
PH BLDV: 7.24 — LOW (ref 7.26–7.43)
PH BLDV: 7.32 — SIGNIFICANT CHANGE UP (ref 7.26–7.43)
PH UR: 5.5 — SIGNIFICANT CHANGE UP (ref 5–8)
PLATELET # BLD AUTO: 195 K/UL — SIGNIFICANT CHANGE UP (ref 130–400)
PO2 BLDV: 38 MMHG — SIGNIFICANT CHANGE UP (ref 20–40)
PO2 BLDV: 44 MMHG — HIGH (ref 20–40)
POTASSIUM BLDV-SCNC: 4 MMOL/L — SIGNIFICANT CHANGE UP (ref 3.3–5.6)
POTASSIUM BLDV-SCNC: 4.2 MMOL/L — SIGNIFICANT CHANGE UP (ref 3.3–5.6)
POTASSIUM SERPL-MCNC: 5 MMOL/L — SIGNIFICANT CHANGE UP (ref 3.5–5)
POTASSIUM SERPL-SCNC: 5 MMOL/L — SIGNIFICANT CHANGE UP (ref 3.5–5)
PROT SERPL-MCNC: 6.1 G/DL — SIGNIFICANT CHANGE UP (ref 6–8)
PROT UR-MCNC: ABNORMAL MG/DL
RBC # BLD: 4.38 M/UL — SIGNIFICANT CHANGE UP (ref 4.2–5.4)
RBC # FLD: 15 % — HIGH (ref 11.5–14.5)
RBC CASTS # UR COMP ASSIST: ABNORMAL /HPF
SAO2 % BLDV: 62 % — SIGNIFICANT CHANGE UP
SAO2 % BLDV: 75 % — SIGNIFICANT CHANGE UP
SODIUM SERPL-SCNC: 139 MMOL/L — SIGNIFICANT CHANGE UP (ref 135–146)
SP GR SPEC: 1.02 — SIGNIFICANT CHANGE UP (ref 1.01–1.03)
UROBILINOGEN FLD QL: 0.2 MG/DL — SIGNIFICANT CHANGE UP (ref 0.2–0.2)
WBC # BLD: 7.65 K/UL — SIGNIFICANT CHANGE UP (ref 4.8–10.8)
WBC # FLD AUTO: 7.65 K/UL — SIGNIFICANT CHANGE UP (ref 4.8–10.8)
WBC UR QL: ABNORMAL /HPF

## 2019-06-25 PROCEDURE — 99285 EMERGENCY DEPT VISIT HI MDM: CPT

## 2019-06-25 PROCEDURE — 93010 ELECTROCARDIOGRAM REPORT: CPT

## 2019-06-25 PROCEDURE — 71045 X-RAY EXAM CHEST 1 VIEW: CPT | Mod: 26

## 2019-06-25 PROCEDURE — 74176 CT ABD & PELVIS W/O CONTRAST: CPT | Mod: 26

## 2019-06-25 PROCEDURE — 70450 CT HEAD/BRAIN W/O DYE: CPT | Mod: 26

## 2019-06-25 RX ORDER — VANCOMYCIN HCL 1 G
1250 VIAL (EA) INTRAVENOUS ONCE
Refills: 0 | Status: COMPLETED | OUTPATIENT
Start: 2019-06-25 | End: 2019-06-25

## 2019-06-25 RX ORDER — CEFEPIME 1 G/1
1000 INJECTION, POWDER, FOR SOLUTION INTRAMUSCULAR; INTRAVENOUS ONCE
Refills: 0 | Status: COMPLETED | OUTPATIENT
Start: 2019-06-25 | End: 2019-06-25

## 2019-06-25 RX ORDER — SODIUM CHLORIDE 9 MG/ML
400 INJECTION, SOLUTION INTRAVENOUS ONCE
Refills: 0 | Status: COMPLETED | OUTPATIENT
Start: 2019-06-25 | End: 2019-06-25

## 2019-06-25 RX ORDER — ATORVASTATIN CALCIUM 80 MG/1
10 TABLET, FILM COATED ORAL AT BEDTIME
Refills: 0 | Status: DISCONTINUED | OUTPATIENT
Start: 2019-06-25 | End: 2019-07-13

## 2019-06-25 RX ORDER — HEPARIN SODIUM 5000 [USP'U]/ML
5000 INJECTION INTRAVENOUS; SUBCUTANEOUS EVERY 12 HOURS
Refills: 0 | Status: DISCONTINUED | OUTPATIENT
Start: 2019-06-25 | End: 2019-07-13

## 2019-06-25 RX ORDER — SODIUM CHLORIDE 9 MG/ML
1000 INJECTION, SOLUTION INTRAVENOUS ONCE
Refills: 0 | Status: COMPLETED | OUTPATIENT
Start: 2019-06-25 | End: 2019-06-25

## 2019-06-25 RX ORDER — SODIUM CHLORIDE 9 MG/ML
2000 INJECTION INTRAMUSCULAR; INTRAVENOUS; SUBCUTANEOUS ONCE
Refills: 0 | Status: COMPLETED | OUTPATIENT
Start: 2019-06-25 | End: 2019-06-25

## 2019-06-25 RX ORDER — ACETAMINOPHEN 500 MG
650 TABLET ORAL ONCE
Refills: 0 | Status: COMPLETED | OUTPATIENT
Start: 2019-06-25 | End: 2019-06-25

## 2019-06-25 RX ADMIN — Medication 166.67 MILLIGRAM(S): at 14:11

## 2019-06-25 RX ADMIN — SODIUM CHLORIDE 1000 MILLILITER(S): 9 INJECTION, SOLUTION INTRAVENOUS at 10:15

## 2019-06-25 RX ADMIN — SODIUM CHLORIDE 1000 MILLILITER(S): 9 INJECTION, SOLUTION INTRAVENOUS at 12:41

## 2019-06-25 RX ADMIN — HEPARIN SODIUM 5000 UNIT(S): 5000 INJECTION INTRAVENOUS; SUBCUTANEOUS at 18:34

## 2019-06-25 RX ADMIN — SODIUM CHLORIDE 800 MILLILITER(S): 9 INJECTION, SOLUTION INTRAVENOUS at 12:57

## 2019-06-25 RX ADMIN — Medication 650 MILLIGRAM(S): at 10:44

## 2019-06-25 RX ADMIN — ATORVASTATIN CALCIUM 10 MILLIGRAM(S): 80 TABLET, FILM COATED ORAL at 22:06

## 2019-06-25 RX ADMIN — CEFEPIME 100 MILLIGRAM(S): 1 INJECTION, POWDER, FOR SOLUTION INTRAMUSCULAR; INTRAVENOUS at 12:57

## 2019-06-25 RX ADMIN — SODIUM CHLORIDE 2000 MILLILITER(S): 9 INJECTION INTRAMUSCULAR; INTRAVENOUS; SUBCUTANEOUS at 14:11

## 2019-06-25 NOTE — H&P ADULT - ATTENDING COMMENTS
Patient was evaluated and examined by bedside, independently, remains lethargic, had low grade fever, low bsfs, on IVF.    All labs, radiology studies, VS was reviewed  Vital Signs Last 24 Hrs  T(C): 36.4 (26 Jun 2019 12:33), Max: 38 (26 Jun 2019 02:07)  T(F): 97.5 (26 Jun 2019 12:33), Max: 100.4 (26 Jun 2019 02:07)  HR: 71 (26 Jun 2019 06:05) (71 - 86)  BP: 106/53 (26 Jun 2019 06:05) (106/53 - 140/69)  BP(mean): --  RR: 17 (26 Jun 2019 12:33) (17 - 18)  SpO2: 95% (25 Jun 2019 23:45) (95% - 97%)  GENERAL: NAD, lethargic, patient is laying comfortably in bed  HEENT: AT, NC, PERRLA, SUPPLE, NO JVD, NO CB  LUNG: CTA B/L  CVS: normal S1, S2, RRR, NO M/G/R  ABDOMEN: soft, bowel sounds present, normoactive in all 4 quadrants, non-tender, non-distended  EXT: no E/C/C, positive PP b/l extremities  NEURO: patient remains in comatose state, not following commands  SKIN: no rash, no ecchymosis    I agree with medical plan outlined by Medical resident as stated above. Patient was evaluated and examined by bedside, independently, remains lethargic, had low grade fever, low bsfs, on IVF.    All labs, radiology studies, VS was reviewed  Vital Signs Last 24 Hrs  T(C): 36.4 (26 Jun 2019 12:33), Max: 38 (26 Jun 2019 02:07)  T(F): 97.5 (26 Jun 2019 12:33), Max: 100.4 (26 Jun 2019 02:07)  HR: 71 (26 Jun 2019 06:05) (71 - 86)  BP: 106/53 (26 Jun 2019 06:05) (106/53 - 140/69)  BP(mean): --  RR: 17 (26 Jun 2019 12:33) (17 - 18)  SpO2: 95% (25 Jun 2019 23:45) (95% - 97%)  GENERAL: NAD, lethargic, patient is laying comfortably in bed  HEENT: AT, NC, PERRLA, SUPPLE, NO JVD, NO CB  EYES: constricted pupils with reaction to direct light stimulation b/l  LUNG: CTA B/L  CVS: normal S1, S2, RRR, NO M/G/R  ABDOMEN: soft, bowel sounds present, normoactive in all 4 quadrants, non-tender, non-distended  EXT: no E/C/C, positive PP b/l extremities  NEURO: patient remains in comatose state, not following  verbal commands  SKIN: bluish discoloration of b/l feet toes, warm to touch    I agree with medical plan outlined by Medical resident as stated above.    -Comatose state- no metabolic encephalopathy, recent blood cxs- no bacterial growth, will f/up blood and urine cxs  -no leukocytosis on admission.  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    -Acute Urinary retention- seen on renal/bladder US- insert vázquez, monitor I and O strictly    -Recently tx. pneumonia- pt. completed tx., CXR- persistent left basilar opacity - underlying neoplasm can not be r/o, patient will need outpatient CT chest to be repeated in 1.5 months to assess left lung opacitiy.    -KATINA- most likely due to urinary retention, continue IVF, repeated renal function has improved.    -h/o HTN, due to hypotension, continue to hold all b/p meds, IVF    diet: keep pt. NPO till she is awake and evaluated by speech tx.

## 2019-06-25 NOTE — ED PROVIDER NOTE - OBJECTIVE STATEMENT
85 yo female h/o HLD, daily tobacco smoker, HTN, s/p recent hospital admission for pneumonia here for evaluation of fever since yesterday, generalized weakness and poor appetite.  According to daughter and home health aide, the patient seems a bit confused this morning, yesterday she was give a tablet of Bactrim she had left over from prior infection, but she threw up once after taking it. 85 yo female h/o HLD, daily tobacco smoker, HTN, s/p recent hospital admission for pneumonia here for evaluation of fever since yesterday, generalized weakness and poor appetite.  According to daughter and home health aide, the patient seems a bit confused this morning, yesterday she was give a tablet of Bactrim she had left over from prior infection, but she threw up once after taking it.  daughter also noticed a cut on her left hand and leg abrasion that seem to be new.  Paitent is usually not very talkative, butt seems to be quiet today.  Will check labs, CXR, CT head/abdomen/pelvis, fluds.

## 2019-06-25 NOTE — ED PROVIDER NOTE - UNABLE TO OBTAIN
Dementia As per daughter patient is a very poor historian at baseline and does not complain of anything.

## 2019-06-25 NOTE — ED ADULT NURSE REASSESSMENT NOTE - NS ED NURSE REASSESS COMMENT FT1
patient at bedside with family. patient received iv bolus & abx. patient has, unknown source of infection. patient moved to critical care area. report given to RN

## 2019-06-25 NOTE — ED PROVIDER NOTE - CLINICAL SUMMARY MEDICAL DECISION MAKING FREE TEXT BOX
86 female here for increasing deconditioning from home as per aide, recent admission for same, due to dementia is a limited historian. Got screening labs, imaging, IVF, broad spectrum ABX, reevaluation, and admission.

## 2019-06-25 NOTE — ED ADULT NURSE NOTE - NSIMPLEMENTINTERV_GEN_ALL_ED
Implemented All Fall Risk Interventions:  [place] to call system. Call bell, personal items and telephone within reach. Instruct patient to call for assistance. Room bathroom lighting operational. Non-slip footwear when patient is off stretcher. Physically safe environment: no spills, clutter or unnecessary equipment. Stretcher in lowest position, wheels locked, appropriate side rails in place. Provide visual cue, wrist band, yellow gown, etc. Monitor gait and stability. Monitor for mental status changes and reorient to person, place, and time. Review medications for side effects contributing to fall risk. Reinforce activity limits and safety measures with patient and family.

## 2019-06-25 NOTE — H&P ADULT - NSHPLABSRESULTS_GEN_ALL_CORE
13.1   7.65  )-----------( 195      ( 25 Jun 2019 10:00 )             41.8       06-25    139  |  102  |  52<H>  ----------------------------<  97  5.0   |  23  |  1.9<H>    Ca    9.2      25 Jun 2019 10:00    TPro  6.1  /  Alb  3.4<L>  /  TBili  0.6  /  DBili  x   /  AST  63<H>  /  ALT  51<H>  /  AlkPhos  54  06-25

## 2019-06-25 NOTE — ED ADULT NURSE NOTE - OBJECTIVE STATEMENT
patient c/o of b/l le edema and lethargy. patient at bedside responsive to stimuli however very drowsy. patient been having temps as per daughter +n/v/d

## 2019-06-25 NOTE — ED PROVIDER NOTE - PROGRESS NOTE DETAILS
Labs show KATINA as compared with recent labs, she is febrile, Fluid bolus was ordered as well as abx  , source is unknown at this time..  CXR and UA appear WNL.  Lactate is elevated, will repeat after fluid bolus.  Family aware of plan. The  patient's blood pressure dropped to 97 systolic, she appears tired, will upgrade her to CC area.  Radiologist read CXR as basilar opacity.  The case was endorsed to Dr Montero and the patient was moved to CC area for further care.

## 2019-06-25 NOTE — H&P ADULT - ASSESSMENT
86 y/f with PMH of DLD, HTN, recent hospitalization for pneumonia presents here from home with reported history of fever since yesterday.       # Metabolic encephalopathy:  Possibly d/t KATINA d/t dehydration vs sepsis  WIll treat for possible UTI LE small , WBC 6-10, will give Levaquin (also to cover for possible left basilar opacity)  CXR offical read as Left basilar opacity, although seems to be present in previous xray as well.  f/u urine and blood culture  Lactate decreased to 1.8 from 3.1 after iv fluids      # Acute on CKD3:  Baseline crt 0.6-0.7, currently 1.9  Likely prerenal elevated BUN/CRt  Received 3400 cc of LR in ED, will hold off further iv fluids for now  f/u urine lytes, Renal ultrasound    # HTN:  Hold off medication, controlled  Was discharged on Enalapril 10 mg q24 and metoprolol 25 q12 , please verify home medication with the family    # Dyslipidemia:  c/w statin, was discharged on Atorvastatin 10 mg q24    # Diet:  DASH diet, if passes dysphagia screening at bedside by nursing    # Activity:  As tolerated    # DVT Ppx:  heparin    # full code    # Dispo:   Has home health aide at home  May need nursing home placement given the recurrent admission 86 y/f with PMH of DLD, HTN, recent hospitalization for pneumonia presents here from home with reported history of fever since yesterday.       # Metabolic encephalopathy:  Possibly d/t KATINA d/t dehydration vs sepsis  WIll treat for possible UTI LE small , WBC 6-10, will give Levaquin (also to cover for possible left basilar opacity)  CXR offical read as Left basilar opacity, although seems to be present in previous xray as well.  f/u urine and blood culture  Lactate decreased to 1.8 from 3.1 after iv fluids  CT head -ve      # Acute on CKD3:  Baseline crt 0.6-0.7, currently 1.9  Likely prerenal elevated BUN/CRt  Received 3400 cc of LR in ED, will hold off further iv fluids for now  f/u urine lytes, Renal ultrasound    # HTN:  Hold off medication, controlled  Was discharged on Enalapril 10 mg q24 and metoprolol 25 q12 , please verify home medication with the family    # Mild Transminitis:  Mild, normal bilirubin  CT abdomen normal    # Dyslipidemia:  c/w statin, was discharged on Atorvastatin 10 mg q24    # Diet:  DASH diet, if passes dysphagia screening at bedside by nursing    # Activity:  As tolerated    # DVT Ppx:  heparin    # full code    # Dispo:   Has home health aide at home  May need nursing home placement given the recurrent admission

## 2019-06-25 NOTE — H&P ADULT - HISTORY OF PRESENT ILLNESS
· HPI Objective Statement: 87 yo female h/o HLD, daily tobacco smoker, HTN, s/p recent hospital admission for pneumonia here for evaluation of fever since yesterday, generalized weakness and poor appetite.  According to daughter and home health aide, the patient seems a bit confused this morning, yesterday she was give a tablet of Bactrim she had left over from prior infection, but she threw up once after taking it.  daughter also noticed a cut on her left hand and leg abrasion that seem to be new.  Paitent is usually not very talkative, butt seems to be quiet today.  Will check labs, CXR, CT head/abdomen/pelvis, fluds. 86 y/f with PMH of DLD, HTN, recent hospitalization for pneumonia presents here from home with reported history of fever since yesterday. She is unable to provide any history, no family at bedside, couldnt reach Daughter Waleska Ocasio at this time inspite of trying multiple times, history based on ED chart. She is reported to have she was having fever x 1 day, she also seemed to be confused this AM, was given Bactrim which was left over from her prior infection but she threw up after taking it.   In ED she was hypotensive to SBP 97, with elevated lactate. She was given 3400 ml of LR, after which her BP improved and lactate decreased.

## 2019-06-25 NOTE — H&P ADULT - NSHPPHYSICALEXAM_GEN_ALL_CORE
T(C): 38.4 (06-25-19 @ 09:25), Max: 38.4 (06-25-19 @ 09:25)  HR: 83 (06-25-19 @ 14:54) (80 - 98)  BP: 116/59 (06-25-19 @ 14:54) (97/56 - 144/56)  RR: 18 (06-25-19 @ 10:44) (18 - 18)  SpO2: 97% (06-25-19 @ 10:44) (97% - 97%)  PHYSICAL EXAM:    Constitutional: Lying in bed no acute distress    Eyes: PERLAA    ENMT: No facial deviation    Neck: No mass/ JVD    Respiratory: B/l clear, no crackles, wheeze    Cardiovascular: Regular rate and rhythm, no murmur    Gastrointestinal: soft non tender, no organomegaly    Extremities: No edema, B/l lower ext    Neurological: AO x 3, non focal T(C): 38.4 (06-25-19 @ 09:25), Max: 38.4 (06-25-19 @ 09:25)  HR: 83 (06-25-19 @ 14:54) (80 - 98)  BP: 116/59 (06-25-19 @ 14:54) (97/56 - 144/56)  RR: 18 (06-25-19 @ 10:44) (18 - 18)  SpO2: 97% (06-25-19 @ 10:44) (97% - 97%)  PHYSICAL EXAM:    Constitutional: Lying in bed no acute distress    Eyes: PERLAA    ENMT: No facial deviation    Neck: No mass/ JVD    Respiratory: B/l clear, no crackles, wheeze    Cardiovascular: Regular rate, systolic murmur in mitral area    Gastrointestinal: soft non tender, no organomegaly    Extremities: B/l lower ext non pitting edema    Neurological: AO x 0, opens eyes to verbal stimuli but responds yes only, further assessment couldn't be done as she is not following simple commands

## 2019-06-25 NOTE — ED PROVIDER NOTE - PHYSICAL EXAMINATION
Vital Signs: I have reviewed the initial vital signs.  Constitutional: well-nourished elderly female, appears stated age, no acute distress  HEENT: head AT/NC, PERRL, refuses to show her tongue,   Cardiovascular: regular rate, regular rhythm, well-perfused extremities  Respiratory: unlabored respiratory effort, clear to auscultation bilaterally, equal air entry  Gastrointestinal: soft, non-distended abdomen, no pulsatile mass, patient winced when RUQ was palpated  Musculoskeletal: supple neck, no lower extremity edema or calf ttp, feet are cool to the touch b/l  Integumentary: warm, dry, no rash, + non-bleeding skin tear left 1st webspace,  5 cm superficial abrasion on the rt ankle  Neurologic: awake, alert, but does not answer questions, no facial droop, moving extremities equally  Psychiatric: appropriate mood, appropriate affect

## 2019-06-25 NOTE — ED PROVIDER NOTE - CARE PLAN
Principal Discharge DX:	Pneumonia  Secondary Diagnosis:	Sepsis  Secondary Diagnosis:	UTI (urinary tract infection)

## 2019-06-26 LAB
ALBUMIN SERPL ELPH-MCNC: 2.8 G/DL — LOW (ref 3.5–5.2)
ALP SERPL-CCNC: 44 U/L — SIGNIFICANT CHANGE UP (ref 30–115)
ALT FLD-CCNC: 38 U/L — SIGNIFICANT CHANGE UP (ref 0–41)
ANION GAP SERPL CALC-SCNC: 8 MMOL/L — SIGNIFICANT CHANGE UP (ref 7–14)
AST SERPL-CCNC: 47 U/L — HIGH (ref 0–41)
BILIRUB SERPL-MCNC: 0.4 MG/DL — SIGNIFICANT CHANGE UP (ref 0.2–1.2)
BUN SERPL-MCNC: 33 MG/DL — HIGH (ref 10–20)
CALCIUM SERPL-MCNC: 8.5 MG/DL — SIGNIFICANT CHANGE UP (ref 8.5–10.1)
CHLORIDE SERPL-SCNC: 109 MMOL/L — SIGNIFICANT CHANGE UP (ref 98–110)
CO2 SERPL-SCNC: 25 MMOL/L — SIGNIFICANT CHANGE UP (ref 17–32)
CREAT SERPL-MCNC: 1 MG/DL — SIGNIFICANT CHANGE UP (ref 0.7–1.5)
CULTURE RESULTS: NO GROWTH — SIGNIFICANT CHANGE UP
GLUCOSE BLDC GLUCOMTR-MCNC: 119 MG/DL — HIGH (ref 70–99)
GLUCOSE BLDC GLUCOMTR-MCNC: 68 MG/DL — LOW (ref 70–99)
GLUCOSE BLDC GLUCOMTR-MCNC: 73 MG/DL — SIGNIFICANT CHANGE UP (ref 70–99)
GLUCOSE BLDC GLUCOMTR-MCNC: 88 MG/DL — SIGNIFICANT CHANGE UP (ref 70–99)
GLUCOSE SERPL-MCNC: 84 MG/DL — SIGNIFICANT CHANGE UP (ref 70–99)
HCT VFR BLD CALC: 34.7 % — LOW (ref 37–47)
HGB BLD-MCNC: 10.8 G/DL — LOW (ref 12–16)
MCHC RBC-ENTMCNC: 29.9 PG — SIGNIFICANT CHANGE UP (ref 27–31)
MCHC RBC-ENTMCNC: 31.1 G/DL — LOW (ref 32–37)
MCV RBC AUTO: 96.1 FL — SIGNIFICANT CHANGE UP (ref 81–99)
NRBC # BLD: 0 /100 WBCS — SIGNIFICANT CHANGE UP (ref 0–0)
PLATELET # BLD AUTO: 133 K/UL — SIGNIFICANT CHANGE UP (ref 130–400)
POTASSIUM SERPL-MCNC: 4.5 MMOL/L — SIGNIFICANT CHANGE UP (ref 3.5–5)
POTASSIUM SERPL-SCNC: 4.5 MMOL/L — SIGNIFICANT CHANGE UP (ref 3.5–5)
PROT SERPL-MCNC: 4.9 G/DL — LOW (ref 6–8)
RBC # BLD: 3.61 M/UL — LOW (ref 4.2–5.4)
RBC # FLD: 15 % — HIGH (ref 11.5–14.5)
SODIUM SERPL-SCNC: 142 MMOL/L — SIGNIFICANT CHANGE UP (ref 135–146)
SPECIMEN SOURCE: SIGNIFICANT CHANGE UP
WBC # BLD: 4.55 K/UL — LOW (ref 4.8–10.8)
WBC # FLD AUTO: 4.55 K/UL — LOW (ref 4.8–10.8)

## 2019-06-26 PROCEDURE — 76770 US EXAM ABDO BACK WALL COMP: CPT | Mod: 26

## 2019-06-26 PROCEDURE — 99223 1ST HOSP IP/OBS HIGH 75: CPT | Mod: AI

## 2019-06-26 RX ORDER — CHLORHEXIDINE GLUCONATE 213 G/1000ML
1 SOLUTION TOPICAL
Refills: 0 | Status: DISCONTINUED | OUTPATIENT
Start: 2019-06-26 | End: 2019-07-13

## 2019-06-26 RX ORDER — DEXTROSE 50 % IN WATER 50 %
25 SYRINGE (ML) INTRAVENOUS ONCE
Refills: 0 | Status: COMPLETED | OUTPATIENT
Start: 2019-06-26 | End: 2019-06-26

## 2019-06-26 RX ORDER — ACETAMINOPHEN 500 MG
650 TABLET ORAL EVERY 6 HOURS
Refills: 0 | Status: DISCONTINUED | OUTPATIENT
Start: 2019-06-26 | End: 2019-07-13

## 2019-06-26 RX ORDER — SODIUM CHLORIDE 9 MG/ML
1000 INJECTION, SOLUTION INTRAVENOUS
Refills: 0 | Status: DISCONTINUED | OUTPATIENT
Start: 2019-06-26 | End: 2019-06-29

## 2019-06-26 RX ADMIN — CHLORHEXIDINE GLUCONATE 1 APPLICATION(S): 213 SOLUTION TOPICAL at 05:11

## 2019-06-26 RX ADMIN — HEPARIN SODIUM 5000 UNIT(S): 5000 INJECTION INTRAVENOUS; SUBCUTANEOUS at 05:11

## 2019-06-26 RX ADMIN — Medication 650 MILLIGRAM(S): at 02:42

## 2019-06-26 RX ADMIN — SODIUM CHLORIDE 100 MILLILITER(S): 9 INJECTION, SOLUTION INTRAVENOUS at 09:19

## 2019-06-26 RX ADMIN — Medication 25 MILLILITER(S): at 16:20

## 2019-06-26 RX ADMIN — Medication 650 MILLIGRAM(S): at 02:12

## 2019-06-26 RX ADMIN — HEPARIN SODIUM 5000 UNIT(S): 5000 INJECTION INTRAVENOUS; SUBCUTANEOUS at 17:26

## 2019-06-26 RX ADMIN — SODIUM CHLORIDE 100 MILLILITER(S): 9 INJECTION, SOLUTION INTRAVENOUS at 22:07

## 2019-06-26 NOTE — CONSULT NOTE ADULT - ATTENDING COMMENTS
Patietn examined last night.  AMS likely toxic/metabolic encephalopathy secondary to infection.  Reconsult if fails to improve with anitbiotics.

## 2019-06-26 NOTE — PHYSICAL THERAPY INITIAL EVALUATION ADULT - SPECIFY REASON(S)
PT Initial Evaluation attempted. Pt was seen from 13:10-13:20 for a total of 10 min. Pt is currently on hold secondary to being unable to be aroused after various attempts... cont

## 2019-06-26 NOTE — MEDICAL STUDENT PROGRESS NOTE(EDUCATION) - SUBJECTIVE AND OBJECTIVE BOX
CC: fever x 1 day  HPI: 87 yo F with PMH of DLD, HTN, CHF, CKD3, and recent hospitalization for pneumonia (6/6/19-6/10/19) presents from home with fever for one day. Patient is unable to provide any history and no family at bedside, so current history obtained from patients chart from last admission. In the ED patient was hypotensive with SBP 97 and elevated lactate, was given 3400mL LR which improved BP and lactate.     Patient is admitted to medicine for sepsis/pneumonia/UTI workup.    Today is hospital day 1. Patient is examined at bedside. She is lethargic and non-verbal at this time. Will open eyes to name but otherwise not communicating. Last admission patient was AAOx2, able to ambulate with walker, incontinent of urine and stool. Currently afebrile.    PMH:  DLD, HTN, CHF, CKD3  Hospitalizations: 6/6/19-6/10/19 for pneumonia  PSH: bilateral hip replacements, h/o cholecystectomy  Allergies: no known allergies  Social Hx: current smoker 4 cigarrettes/day, admits to smoking all of adult life; denies alcohol or illicit drug use    Vitals:  T 98.3F  HR 71  /53  RR 17  SpO2 95% on 3L NC

## 2019-06-26 NOTE — CONSULT NOTE ADULT - ASSESSMENT
Impression:  86 y/f with PMH of DLD, HTN, recent hospitalization for pneumonia presents here from home with reported history of fever, lethargy    Plan:  treat underlying metabolic/infection process  check B12/Folate/Ammonia/TSH  REEG

## 2019-06-26 NOTE — SWALLOW BEDSIDE ASSESSMENT ADULT - SLP PERTINENT HISTORY OF CURRENT PROBLEM
Pt presented w/ fever x1 day; recent hospitalization for PNA, known to ST service w/ reccs on 6/11 for dysphagia 3 w/ nectar thick liquids. PMHx: DLD, HTN.

## 2019-06-26 NOTE — PROCEDURE NOTE - NSPOSTCAREGUIDE_GEN_A_CORE
Verbal/written post procedure instructions were given to patient/caregiver/Instructed patient/caregiver regarding signs and symptoms of infection/Keep the cast/splint/dressing clean and dry

## 2019-06-26 NOTE — CONSULT NOTE ADULT - SUBJECTIVE AND OBJECTIVE BOX
HPI:  86 y/f with PMH of DLD, HTN, recent hospitalization for pneumonia presents here from home with reported history of fever since yesterday. She is unable to provide any history, no family at bedside, couldnt reach Daughter Waleska Ocasio at this time inspite of trying multiple times, history based on ED chart. She is reported to have she was having fever x 1 day, she also seemed to be confused this AM, was given Bactrim which was left over from her prior infection but she threw up after taking it.   In ED she was hypotensive to SBP 97, with elevated lactate. She was given 3400 ml of LR, after which her BP improved and lactate decreased. (25 Jun 2019 15:06)    PAST MEDICAL & SURGICAL HISTORY:  HLD (hyperlipidemia)  Diastolic dysfunction  HTN (hypertension)  History of cholecystectomy  H/O bilateral hip replacements: Right Hip ORIF on Xray    Home Medications:  atorvastatin 10 mg oral tablet: 1 tab(s) orally once a day (06 Jun 2019 19:51)  enalapril 10 mg oral tablet: 1 tab(s) orally once a day (06 Jun 2019 19:51)  ferrous sulfate 325 mg (65 mg elemental iron) oral tablet: 1 tab(s) orally 2 times a day (06 Jun 2019 19:52)    Vital Signs Last 24 Hrs  T(C): 36.4 (26 Jun 2019 12:33), Max: 38 (26 Jun 2019 02:07)  T(F): 97.5 (26 Jun 2019 12:33), Max: 100.4 (26 Jun 2019 02:07)  HR: 71 (26 Jun 2019 06:05) (71 - 86)  BP: 106/53 (26 Jun 2019 06:05) (106/53 - 111/53)  BP(mean): --  RR: 17 (26 Jun 2019 12:33) (17 - 17)  SpO2: 100% (26 Jun 2019 20:09) (95% - 100%)    Lethargic  PERRL  follows basic commands but non-verbal, only moaning  generalized weakness  no gross sensory deficit  upgoing plantar on left    Allergies    No Known Allergies    Intolerances      MEDICATIONS  (STANDING):  atorvastatin 10 milliGRAM(s) Oral at bedtime  chlorhexidine 4% Liquid 1 Application(s) Topical <User Schedule>  dextrose 5% + sodium chloride 0.9%. 1000 milliLiter(s) (100 mL/Hr) IV Continuous <Continuous>  heparin  Injectable 5000 Unit(s) SubCutaneous every 12 hours  levoFLOXacin IVPB 250 milliGRAM(s) IV Intermittent every 48 hours    MEDICATIONS  (PRN):  acetaminophen   Tablet .. 650 milliGRAM(s) Oral every 6 hours PRN Temp greater or equal to 38C (100.4F)      LABS:                        10.8   4.55  )-----------( 133      ( 26 Jun 2019 06:14 )             34.7     06-26    142  |  109  |  33<H>  ----------------------------<  84  4.5   |  25  |  1.0    Ca    8.5      26 Jun 2019 06:14    TPro  4.9<L>  /  Alb  2.8<L>  /  TBili  0.4  /  DBili  x   /  AST  47<H>  /  ALT  38  /  AlkPhos  44  06-26        < from: CT Head No Cont (06.25.19 @ 13:22) >  The cortical sulci and ventricular system are symmetrically prominent   consistent with age related cerebral volume loss.    Chronic left basal ganglia lacunar infarction is seen. There are   scattered subcortical and periventricular hypodensities without mass   effect most consistent with chronic microvascular ischemic changes.    There is no mass effect, midline shift or intracranial hemorrhage.      The bones are intact without depressed skull fracture.    The visualized paranasal sinuses are unremarkable.  The mastoid air cells   are well aerated.      Impression:      No mass effect or intracranial hemorrhage.     Chronic left basal ganglia lacunar infarction.    < end of copied text >

## 2019-06-26 NOTE — MEDICAL STUDENT PROGRESS NOTE(EDUCATION) - NS MD HP STUD ASPLAN ASSES FT
85 yo F with PMH of DLD, HTN, CHF, CKD3, and recent hospitalization for pneumonia presents from home with fever for one day, currently admitted and being worked up for sepsis/pneumonia/uti.

## 2019-06-26 NOTE — MEDICAL STUDENT PROGRESS NOTE(EDUCATION) - NS MD HP STUD ASPLAN PLAN FT
#Sepsis/pneumonia/UTI workup  - UA: small LE, wbc 6-10, mod bacteria, negative nitrite  - Ucx negative  - f/u blood Cx- received  - CXR: left basilar opacity (possibly unchanged since last admission- CT at that time showed LLL consolidation)  - monitor renal function  - neuro assessment qshift  - f/u speech and swallow once patient awake    #Acute on CKD3- improving  - Baseline Cr 0.6-0.7, currently 1.0  - GFR improving 51<27  - f/u renal ultrasound    #HTN  - /53 - hold BP meds  - f/u orthostatics  - start D5NS @100ml/hr for 24 hrs  - verify home BP meds- d/c on enalapril 10 mg qd and metoprolol 25mg q12hrs last admission    #Dyslipidemia  - c/w atorvastatin 10mg qd    #DVT Ppx: heparin SQ  #GI Ppx: none  #Activity: ambulate as tolerated, with walker, PT consulted  #Diet: DASH  #Dispo: from home with HHA  #Code status: full code

## 2019-06-26 NOTE — SWALLOW BEDSIDE ASSESSMENT ADULT - SWALLOW EVAL: DIAGNOSIS
Pt unable to maintain appropriate arousal for PO trials. Pt opened eyes, however did not appear oriented, very minimal verbalizations, and did not maintain attention to feeding task. Pt not appropriate for PO trials at this time.

## 2019-06-27 LAB
ALBUMIN SERPL ELPH-MCNC: 2.8 G/DL — LOW (ref 3.5–5.2)
ALP SERPL-CCNC: 46 U/L — SIGNIFICANT CHANGE UP (ref 30–115)
ALT FLD-CCNC: 31 U/L — SIGNIFICANT CHANGE UP (ref 0–41)
AMMONIA BLD-MCNC: 59 UMOL/L — HIGH (ref 11–55)
ANION GAP SERPL CALC-SCNC: 9 MMOL/L — SIGNIFICANT CHANGE UP (ref 7–14)
AST SERPL-CCNC: 29 U/L — SIGNIFICANT CHANGE UP (ref 0–41)
BASE EXCESS BLDA CALC-SCNC: 0.6 MMOL/L — SIGNIFICANT CHANGE UP (ref -2–2)
BILIRUB SERPL-MCNC: 0.3 MG/DL — SIGNIFICANT CHANGE UP (ref 0.2–1.2)
BUN SERPL-MCNC: 18 MG/DL — SIGNIFICANT CHANGE UP (ref 10–20)
CALCIUM SERPL-MCNC: 8.2 MG/DL — LOW (ref 8.5–10.1)
CHLORIDE SERPL-SCNC: 111 MMOL/L — HIGH (ref 98–110)
CO2 SERPL-SCNC: 26 MMOL/L — SIGNIFICANT CHANGE UP (ref 17–32)
CREAT ?TM UR-MCNC: 73 MG/DL — SIGNIFICANT CHANGE UP
CREAT SERPL-MCNC: 0.7 MG/DL — SIGNIFICANT CHANGE UP (ref 0.7–1.5)
GAS PNL BLDA: SIGNIFICANT CHANGE UP
GLUCOSE BLDC GLUCOMTR-MCNC: 157 MG/DL — HIGH (ref 70–99)
GLUCOSE BLDC GLUCOMTR-MCNC: 87 MG/DL — SIGNIFICANT CHANGE UP (ref 70–99)
GLUCOSE BLDC GLUCOMTR-MCNC: 92 MG/DL — SIGNIFICANT CHANGE UP (ref 70–99)
GLUCOSE BLDC GLUCOMTR-MCNC: 94 MG/DL — SIGNIFICANT CHANGE UP (ref 70–99)
GLUCOSE BLDC GLUCOMTR-MCNC: 97 MG/DL — SIGNIFICANT CHANGE UP (ref 70–99)
GLUCOSE SERPL-MCNC: 89 MG/DL — SIGNIFICANT CHANGE UP (ref 70–99)
HCO3 BLDA-SCNC: 28 MMOL/L — HIGH (ref 23–27)
HCO3 BLDA-SCNC: 28 MMOL/L — HIGH (ref 23–27)
HCT VFR BLD CALC: 38.3 % — SIGNIFICANT CHANGE UP (ref 37–47)
HGB BLD-MCNC: 11.5 G/DL — LOW (ref 12–16)
MCHC RBC-ENTMCNC: 29.8 PG — SIGNIFICANT CHANGE UP (ref 27–31)
MCHC RBC-ENTMCNC: 30 G/DL — LOW (ref 32–37)
MCV RBC AUTO: 99.2 FL — HIGH (ref 81–99)
NRBC # BLD: 0 /100 WBCS — SIGNIFICANT CHANGE UP (ref 0–0)
PCO2 BLDA: 57 MMHG — HIGH (ref 38–42)
PCO2 BLDA: 65 MMHG — CRITICAL HIGH (ref 38–42)
PH BLDA: 7.25 — LOW (ref 7.38–7.42)
PH BLDA: 7.3 — LOW (ref 7.38–7.42)
PLATELET # BLD AUTO: 129 K/UL — LOW (ref 130–400)
PO2 BLDA: 101 MMHG — HIGH (ref 78–95)
PO2 BLDA: 149 MMHG — HIGH (ref 78–95)
POTASSIUM SERPL-MCNC: 4.8 MMOL/L — SIGNIFICANT CHANGE UP (ref 3.5–5)
POTASSIUM SERPL-SCNC: 4.8 MMOL/L — SIGNIFICANT CHANGE UP (ref 3.5–5)
POTASSIUM UR-SCNC: 23 MMOL/L — SIGNIFICANT CHANGE UP
PROT SERPL-MCNC: 5.1 G/DL — LOW (ref 6–8)
RBC # BLD: 3.86 M/UL — LOW (ref 4.2–5.4)
RBC # FLD: 15.3 % — HIGH (ref 11.5–14.5)
SAO2 % BLDA: 98 % — SIGNIFICANT CHANGE UP (ref 94–98)
SAO2 % BLDA: 99 % — HIGH (ref 94–98)
SODIUM SERPL-SCNC: 146 MMOL/L — SIGNIFICANT CHANGE UP (ref 135–146)
SODIUM UR-SCNC: 96 MMOL/L — SIGNIFICANT CHANGE UP
UUN UR-MCNC: 759 MG/DL — SIGNIFICANT CHANGE UP
WBC # BLD: 4.96 K/UL — SIGNIFICANT CHANGE UP (ref 4.8–10.8)
WBC # FLD AUTO: 4.96 K/UL — SIGNIFICANT CHANGE UP (ref 4.8–10.8)

## 2019-06-27 PROCEDURE — 99222 1ST HOSP IP/OBS MODERATE 55: CPT

## 2019-06-27 PROCEDURE — 93010 ELECTROCARDIOGRAM REPORT: CPT

## 2019-06-27 PROCEDURE — 99233 SBSQ HOSP IP/OBS HIGH 50: CPT

## 2019-06-27 RX ORDER — DEXTROSE 50 % IN WATER 50 %
50 SYRINGE (ML) INTRAVENOUS ONCE
Refills: 0 | Status: DISCONTINUED | OUTPATIENT
Start: 2019-06-27 | End: 2019-07-13

## 2019-06-27 RX ORDER — SODIUM CHLORIDE 9 MG/ML
500 INJECTION INTRAMUSCULAR; INTRAVENOUS; SUBCUTANEOUS ONCE
Refills: 0 | Status: COMPLETED | OUTPATIENT
Start: 2019-06-27 | End: 2019-06-27

## 2019-06-27 RX ORDER — IPRATROPIUM/ALBUTEROL SULFATE 18-103MCG
3 AEROSOL WITH ADAPTER (GRAM) INHALATION EVERY 6 HOURS
Refills: 0 | Status: DISCONTINUED | OUTPATIENT
Start: 2019-06-27 | End: 2019-07-11

## 2019-06-27 RX ORDER — DEXTROSE 50 % IN WATER 50 %
50 SYRINGE (ML) INTRAVENOUS ONCE
Refills: 0 | Status: COMPLETED | OUTPATIENT
Start: 2019-06-27 | End: 2019-06-27

## 2019-06-27 RX ADMIN — SODIUM CHLORIDE 100 MILLILITER(S): 9 INJECTION, SOLUTION INTRAVENOUS at 21:26

## 2019-06-27 RX ADMIN — Medication 3 MILLILITER(S): at 19:27

## 2019-06-27 RX ADMIN — Medication 50 MILLILITER(S): at 09:03

## 2019-06-27 RX ADMIN — SODIUM CHLORIDE 100 MILLILITER(S): 9 INJECTION, SOLUTION INTRAVENOUS at 06:03

## 2019-06-27 RX ADMIN — Medication 3 MILLILITER(S): at 13:11

## 2019-06-27 RX ADMIN — Medication 3 MILLILITER(S): at 09:23

## 2019-06-27 RX ADMIN — HEPARIN SODIUM 5000 UNIT(S): 5000 INJECTION INTRAVENOUS; SUBCUTANEOUS at 06:03

## 2019-06-27 RX ADMIN — HEPARIN SODIUM 5000 UNIT(S): 5000 INJECTION INTRAVENOUS; SUBCUTANEOUS at 17:11

## 2019-06-27 RX ADMIN — CHLORHEXIDINE GLUCONATE 1 APPLICATION(S): 213 SOLUTION TOPICAL at 06:03

## 2019-06-27 RX ADMIN — SODIUM CHLORIDE 2000 MILLILITER(S): 9 INJECTION INTRAMUSCULAR; INTRAVENOUS; SUBCUTANEOUS at 09:10

## 2019-06-27 NOTE — PROGRESS NOTE ADULT - SUBJECTIVE AND OBJECTIVE BOX
HPI:   87 y/o F w/ PMH of DLD, HTN, CHF, and recent hospitalization of PNA presented from home w/ fever and AMS. Patient is non-verbal.    INTERVAL HPI:  Patient was examined by bedside. Patient was sleeping, unarousable, and lethargic. Patient transiently responded to sternal rubs, not fully arousable.and no dyspnea at rest.    Vital Signs Last 24 Hrs  T(C): 35.8 (27 Jun 2019 14:36), Max: 37.4 (26 Jun 2019 21:41)  T(F): 96.4 (27 Jun 2019 14:36), Max: 99.3 (26 Jun 2019 21:41)  HR: 71 (27 Jun 2019 14:44) (68 - 79)  BP: 131/58 (27 Jun 2019 14:36) (105/49 - 138/98)  BP(mean): --  RR: 20 (27 Jun 2019 14:36) (18 - 20)  SpO2: 100% (27 Jun 2019 18:18) (96% - 100%)    MEDICATIONS  (STANDING):  ALBUTerol/ipratropium for Nebulization 3 milliLiter(s) Nebulizer every 6 hours  atorvastatin 10 milliGRAM(s) Oral at bedtime  chlorhexidine 4% Liquid 1 Application(s) Topical <User Schedule>  dextrose 5% + sodium chloride 0.9%. 1000 milliLiter(s) (100 mL/Hr) IV Continuous <Continuous>  heparin  Injectable 5000 Unit(s) SubCutaneous every 12 hours  levoFLOXacin IVPB 250 milliGRAM(s) IV Intermittent every 48 hours    MEDICATIONS  (PRN):  acetaminophen   Tablet .. 650 milliGRAM(s) Oral every 6 hours PRN Temp greater or equal to 38C (100.4F)  dextrose 50% Injectable 50 milliLiter(s) IV Push once PRN If Blood glucose is LESS than 70    PHYSICAL EXAM:   General: Patient is laying comfortably in bed, lethargic, and unarousable. NAD  Lungs: CTA B/L, no wheezing  Cardio: S1/S2, RRR, no murmurs   HEENT: Normocephalic, atraumatic. PERRL  GI: NT/ND, soft, no guarding  Neuro: Patient is lethargic, non-arousable, does not respond to verbal commands or sternal rubs.    LABS             11.5   4.96  )-----------( 129      ( 27 Jun 2019 08:13 )             38.3   06-27    146  |  111<H>  |  18  ----------------------------<  89  4.8   |  26  |  0.7    Ca    8.2<L>      27 Jun 2019 08:13    TPro  5.1<L>  /  Alb  2.8<L>  /  TBili  0.3  /  DBili  x   /  AST  29  /  ALT  31  /  AlkPhos  46  06-27    ABG TREND:  ABG - ( 27 Jun 2019 17:20 )  pH: 7.30<L> /  pCO2: 57<H> /  pO2: 101<H> / HCO3: 28<H> / Base Excess: 0.6   /  SaO2: 98    / Lactate: x        ABG - ( 27 Jun 2019 14:17 )  pH: 7.25<L> /  pCO2: 65<HH> /  pO2: 149<H> / HCO3: 28<H> / Base Excess: x     /  SaO2: 99<H> / Lactate: x        ABG - ( 27 Jun 2019 08:49 )  pH: 7.24<L> /  pCO2: 68<HH> /  pO2: 60<L> / HCO3: 29<H> / Base Excess: 0.5   /  SaO2: 91<L> / Lactate: x        GLUCOSE TREND:  POCT GLUCOSE:  POCT Blood Glucose.: 92 mg/dL [70 - 99] (06-27-19)  POCT Blood Glucose.: 94 mg/dL [70 - 99] (06-27-19)  POCT Blood Glucose.: 157 mg/dL <H> [70 - 99] (06-27-19)  POCT Blood Glucose.: 87 mg/dL [70 - 99] (06-27-19)  POCT Blood Glucose.: 88 mg/dL [70 - 99] (06-26-19)  POCT Blood Glucose.: 119 mg/dL <H> [70 - 99] (06-26-19)  POCT Blood Glucose.: 73 mg/dL [70 - 99] (06-26-19)  POCT Blood Glucose.: 68 mg/dL <L> [70 - 99] (06-26-19)  POCT Blood Glucose.: 83 mg/dL [70 - 99] (06-25-19)    AMMONIA:  Ammonia, Serum (06.27.19 @ 12:12)    Ammonia, Serum: 59 umol/L    MICROBIOLOGY:  Culture - Urine (06.25.19 @ 11:23)    Specimen Source: .Urine Catheterized    Culture Results:   No growth    Culture - Blood (06.25.19 @ 10:00)    Specimen Source: .Blood Blood-Peripheral    Culture Results:   No growth to date.    RADIOLOGY:    < from: Xray Chest 1 View- PORTABLE-Urgent (06.25.19 @ 10:39) >  Impression:      Left base opacity. Follow-up to resolution is recommended.    < from: CT Abdomen and Pelvis No Cont (06.25.19 @ 13:32) >  IMPRESSION:     1. Since September 13, 2017, no definite evidence of acute/inflammatory   process within the abdomen or pelvis.    2. Status post cholecystectomy.    < from: CT Head No Cont (06.25.19 @ 13:22) >  Impression:      No mass effect or intracranial hemorrhage.   POCT Glucose Trend  92 mg/dL06-27 @ 16:40  94 mg/dL06-27 @ 11:18  157 mg/dL06-27 @ 09:41  87 mg/dL06-27 @ 07:38    Chronic left basal ganglia lacunar infarction.    < from: US Kidney and Bladder (06.26.19 @ 10:58) >  IMPRESSION:    No hydronephrosis. Bilateral renal cysts as above.    Distended urinary bladder with estimated volume of 564 cc. The patient   was unable to void for this exam.    < end of copied text >    CONSULTS:    Neuro:  Plan:  treat underlying metabolic/infection process  check B12/Folate/Ammonia/TSH  REEG    Speech Pathology:  Pt not appropriate for PO trials at this time. Pt unable to maintain adequate arousal. Pt w/ audible rhonchi and increased secretions in oral cavity. Pt unable to follow commands.

## 2019-06-27 NOTE — PROGRESS NOTE ADULT - ASSESSMENT
Assessment:  87 y/o F w/ PMH of DLD, HTN, CHF, and recent hospitalization of PNA presented from home w/ fever and AMS. Patient is non-verbal.    #Hypercapnic Encephalopathy  - First blood gas revealed pH of 7.24, pCO2 of 68. Trending well.  - Metabolic encephalopathy less likely (see below)  - BCX negative  - UCX negative  - No leukocytosis  - Ammonia elevated (59)  - Repeat EEG and check Folate, TSH, ammonia, B12 levels as per neuro recommendations  - BiPAP  - F/U Brain MR  - Monitor pulse ox    #Hypoglycemia?  - NPO as per speech pathology recommendations  - IVF D5NS@100ml/hr  - POCT reveals low/lower limit of normal blood glucose levels, even with D50 infusion  - Underlying neoplasm (i.e. insulinoma)?  - Infuse dextrose if <80   - AM Cortisol level    #Urinary Retention (acute)  - Renal/Bladder ultrasound revealed retention (560ml)  - Inserted vázquez, with good urine output  - Monitor I&O  - Ammonia level elevated (59)    #DVT PPX  - Lovenox    #Diet  - NPO Assessment:  87 y/o F w/ PMH of DLD, HTN, CHF, and recent hospitalization of PNA presented from home w/ fever and AMS. Patient is non-verbal.    #Hypercapnic Encephalopathy  - First blood gas revealed pH of 7.24, pCO2 of 68. Trending well.  - Metabolic encephalopathy less likely (see below)  - BCX negative  - UCX negative  - No leukocytosis  - Ammonia elevated (59)  - Repeat EEG and check Folate, TSH, ammonia, B12 levels as per neuro recommendations  - BiPAP  - F/U Brain MR  - Monitor pulse ox  - Pulmonary consult placed    #Hypoglycemia?  - NPO as per speech pathology recommendations  - IVF D5NS@100ml/hr  - POCT reveals low/lower limit of normal blood glucose levels, even with D50 infusion  - Underlying neoplasm (i.e. insulinoma)?  - Infuse dextrose if <80   - AM Cortisol level    #Urinary Retention (acute)  - Renal/Bladder ultrasound revealed retention (560ml)  - Inserted vázquez, with good urine output  - Monitor I&O  - Ammonia level elevated (59)    #DVT PPX  - Lovenox    #Diet  - NPO

## 2019-06-27 NOTE — SWALLOW BEDSIDE ASSESSMENT ADULT - SLP PERTINENT HISTORY OF CURRENT PROBLEM
Pt presented w/ fever x1 day; recent hospitalization for PNA, known to ST service w/ reccs on 6/11 for dysphagia 3 w/ nectar thick liquids. PMHx: DLD, HTN. MD reccs for EEG and MRI to r/o new CVA, CXR (-), no new PNA.

## 2019-06-27 NOTE — PROGRESS NOTE ADULT - SUBJECTIVE AND OBJECTIVE BOX
KYRA MENDIOLA  [facility]            Patient was evaluated and examined  by bedside, remains lethargic, with transient awake episodes, confused, no fever, no dyspnea at rest        REVIEW OF SYSTEMS:  unable to obtain due to patient's lethargic state      T(C): , Max: 37.4 (06-26-19 @ 21:41)  HR: 75 (06-27-19 @ 06:00)  BP: 105/49 (06-27-19 @ 06:00)  RR: 18 (06-27-19 @ 06:00)  SpO2: 96% (06-27-19 @ 10:14)  CAPILLARY BLOOD GLUCOSE      POCT Blood Glucose.: 94 mg/dL (27 Jun 2019 11:18)  POCT Blood Glucose.: 157 mg/dL (27 Jun 2019 09:41)  POCT Blood Glucose.: 87 mg/dL (27 Jun 2019 07:38)  POCT Blood Glucose.: 88 mg/dL (26 Jun 2019 21:49)  POCT Blood Glucose.: 119 mg/dL (26 Jun 2019 17:06)      PHYSICAL EXAM:  GENERAL: NAD, lethargic, patient is laying comfortably in bed  HEENT: AT, NC, SUPPLE, NO JVD, NO CB  EYES: constricted pupils with reaction to direct light stimulation b/l  LUNG: good breath sounds B/L  CVS: normal S1, S2, RRR, NO M/G/R  ABDOMEN: soft, bowel sounds present, normoactive in all 4 quadrants, non-tender, non-distended  EXT: no E/C/C, positive PP b/l extremities  NEURO: patient remains in comatose lethargic state, not following  verbal commands  SKIN: no rash        LAB  CBC  Date: 06-27-19 @ 08:13  Mean cell Rlmfxidggc92.8  Mean cell Hemoglobin Conc30.0  Mean cell Volum 99.2  Platelet count-Automate 129  RBC Count 3.86  Red Cell Distrib Width15.3  WBC Count4.96  % Albumin, Urine--  Hematocrit 38.3  Hemoglobin 11.5  CBC  Date: 06-26-19 @ 06:14  Mean cell Julspfbuym12.9  Mean cell Hemoglobin Conc31.1  Mean cell Volum 96.1  Platelet count-Automate 133  RBC Count 3.61  Red Cell Distrib Width15.0  WBC Count4.55  % Albumin, Urine--  Hematocrit 34.7  Hemoglobin 10.8  CBC  Date: 06-25-19 @ 10:00  Mean cell Uxxkjoqnii12.9  Mean cell Hemoglobin Conc31.3  Mean cell Volum 95.4  Platelet count-Automate 195  RBC Count 4.38  Red Cell Distrib Width15.0  WBC Count7.65  % Albumin, Urine--  Hematocrit 41.8  Hemoglobin 13.1    [facility]  06-27-19 @ 08:49  Blood Gas Arterial-Calcium,Ionized1.25 mmoL/L [1.12 - 1.30] [given to Dr. Fox at 6007. 3 lpm cannula]  Blood Urea Nitrogen, Serum --  Carbon Dioxide, Serum--  Chloride, Serum--  Creatinie, Serum--  Glucose, Serum--  Potassium, Serum--  Sodium, Serum --  [facility]  06-27-19 @ 08:13  Blood Gas Arterial-Calcium,Ionized--  Blood Urea Nitrogen, Serum 18 mg/dL [10 - 20]  Carbon Dioxide, Serum26 mmol/L [17 - 32]  Chloride, [id number] mmol/L<H> [98 - 110]  Creatinie, Serum0.7 mg/dL [0.7 - 1.5]  Glucose, Serum89 mg/dL [70 - 99]  Potassium, Serum4.8 mmol/L [3.5 - 5.0]  Sodium, Serum 146 mmol/L [135 - 146]  [facility]  06-26-19 @ 06:14  Blood Gas Arterial-Calcium,Ionized--  Blood Urea Nitrogen, Serum 33 mg/dL<H> [10 - 20]  Carbon Dioxide, Serum25 mmol/L [17 - 32]  Chloride, [id number] mmol/L [98 - 110]  Creatinie, Serum1.0 mg/dL [0.7 - 1.5]  Glucose, Serum84 mg/dL [70 - 99]  Potassium, Serum4.5 mmol/L [3.5 - 5.0]  Sodium, Serum 142 mmol/L [135 - 146]  [facility]  06-25-19 @ 10:00  Blood Gas Arterial-Calcium,Ionized--  Blood Urea Nitrogen, Serum 52 mg/dL<H> [10 - 20]  Carbon Dioxide, Serum23 mmol/L [17 - 32]  Chloride, [id number] mmol/L [98 - 110]  Creatinie, Serum1.9 mg/dL<H> [0.7 - 1.5]  Glucose, Serum97 mg/dL [70 - 99]  Potassium, Serum5.0 mmol/L [3.5 - 5.0] [Slighty Hemolyzed use with Caution]  Sodium, Serum 139 mmol/L [135 - 146]              Microbiology:    Culture - Urine (collected 06-25-19 @ 11:23)  Source: .Urine Catheterized  Final Report (06-26-19 @ 18:11):    No growth    Culture - Blood (collected 06-25-19 @ 10:00)  Source: .Blood Blood-Peripheral  Preliminary Report (06-26-19 @ 17:01):    No growth to date.    Culture - Blood (collected 06-25-19 @ 10:00)  Source: .Blood Blood-Peripheral  Preliminary Report (06-26-19 @ 17:01):    No growth to date.    Culture - Urine (collected 06-06-19 @ 16:45)  Source: .Urine Clean Catch (Midstream)  Final Report (06-07-19 @ 22:56):    <10,000 CFU/mL Normal Urogenital Meghana    Culture - Blood (collected 06-06-19 @ 14:54)  Source: .Blood Blood-Peripheral  Final Report (06-12-19 @ 02:01):    No growth at 5 days.    Culture - Blood (collected 06-06-19 @ 14:54)  Source: .Blood Blood-Peripheral  Final Report (06-12-19 @ 02:01):    No growth at 5 days.          Medications:  acetaminophen   Tablet .. 650 milliGRAM(s) Oral every 6 hours PRN  ALBUTerol/ipratropium for Nebulization 3 milliLiter(s) Nebulizer every 6 hours  atorvastatin 10 milliGRAM(s) Oral at bedtime  chlorhexidine 4% Liquid 1 Application(s) Topical <User Schedule>  dextrose 5% + sodium chloride 0.9%. 1000 milliLiter(s) IV Continuous <Continuous>  heparin  Injectable 5000 Unit(s) SubCutaneous every 12 hours  levoFLOXacin IVPB 250 milliGRAM(s) IV Intermittent every 48 hours        Assessment and Plan:    -Persistent Comatose state- no metabolic encephalopathy, recent blood cxs- no bacterial growth,   - repeated  blood and urine cxs- no bacterial growth  -no leukocytosis on admission.  -CT head was completed on admission , showed old CVA, no acute changes, patient was evaluated by  Neurology specialist , recommended to complete EEG- f/up results  -obtain MRI of Brain to r/o new CVA  -IVF- D5NS@100ml/hr  -bsfs monitoring  -neuro assessments every shift.    -borderline low bsfs- 70-80  range- continue dextrose infusion tx. with close bsfs monitoring    -Acute hypercapneic respiratory failure- acute Respiratory acidosis- will place pt. on BIPAP- repeat ABG two hours post BIPAP tx.  -monitor pulse ox level  -duonebs tx.    -Acute Urinary retention- seen on renal/bladder US- inserted vázquez, patient has good urine output,  - monitor I and O strictly    -Recently tx. pneumonia- pt. completed tx., CXR- persistent left basilar opacity- no new pneumonia   - underlying neoplasm can not be r/o, patient will need outpatient CT chest to be repeated in 1.5 months to assess left lung opacitiy.    -KATINA- most likely due to urinary retention, resolved  continue IVF    -h/o HTN, due to hypotension, continue to hold all b/p meds, IVF    diet: keep pt. NPO till she is awake and evaluated by speech tx.     daily GI and DVT proph.       #Progress Note Handoff: Pending Consults___neuro f/up______,Tests___MRI of brain, EEG,_____,Test Results__ABG at 3pm_____, other: monitor neuro status  Family discussion: yes Disposition: to be determined

## 2019-06-28 LAB
ALBUMIN SERPL ELPH-MCNC: 2.6 G/DL — LOW (ref 3.5–5.2)
ALP SERPL-CCNC: 47 U/L — SIGNIFICANT CHANGE UP (ref 30–115)
ALT FLD-CCNC: 25 U/L — SIGNIFICANT CHANGE UP (ref 0–41)
ANION GAP SERPL CALC-SCNC: 6 MMOL/L — LOW (ref 7–14)
AST SERPL-CCNC: 20 U/L — SIGNIFICANT CHANGE UP (ref 0–41)
BASE EXCESS BLDA CALC-SCNC: 0.9 MMOL/L — SIGNIFICANT CHANGE UP (ref -2–2)
BASE EXCESS BLDA CALC-SCNC: 1.5 MMOL/L — SIGNIFICANT CHANGE UP (ref -2–2)
BILIRUB SERPL-MCNC: 0.3 MG/DL — SIGNIFICANT CHANGE UP (ref 0.2–1.2)
BUN SERPL-MCNC: 14 MG/DL — SIGNIFICANT CHANGE UP (ref 10–20)
CALCIUM SERPL-MCNC: 7.8 MG/DL — LOW (ref 8.5–10.1)
CHLORIDE SERPL-SCNC: 115 MMOL/L — HIGH (ref 98–110)
CO2 SERPL-SCNC: 26 MMOL/L — SIGNIFICANT CHANGE UP (ref 17–32)
CORTIS AM PEAK SERPL-MCNC: 11.8 UG/DL — SIGNIFICANT CHANGE UP (ref 6–18.4)
CREAT SERPL-MCNC: 0.5 MG/DL — LOW (ref 0.7–1.5)
FOLATE SERPL-MCNC: 9.2 NG/ML — SIGNIFICANT CHANGE UP
GAS PNL BLDA: SIGNIFICANT CHANGE UP
GLUCOSE BLDC GLUCOMTR-MCNC: 105 MG/DL — HIGH (ref 70–99)
GLUCOSE BLDC GLUCOMTR-MCNC: 112 MG/DL — HIGH (ref 70–99)
GLUCOSE BLDC GLUCOMTR-MCNC: 115 MG/DL — HIGH (ref 70–99)
GLUCOSE BLDC GLUCOMTR-MCNC: 90 MG/DL — SIGNIFICANT CHANGE UP (ref 70–99)
GLUCOSE BLDC GLUCOMTR-MCNC: 93 MG/DL — SIGNIFICANT CHANGE UP (ref 70–99)
GLUCOSE SERPL-MCNC: 109 MG/DL — HIGH (ref 70–99)
HCO3 BLDA-SCNC: 26 MMOL/L — SIGNIFICANT CHANGE UP (ref 23–27)
HCO3 BLDA-SCNC: 28 MMOL/L — HIGH (ref 23–27)
HCT VFR BLD CALC: 35.8 % — LOW (ref 37–47)
HGB BLD-MCNC: 10.8 G/DL — LOW (ref 12–16)
HOROWITZ INDEX BLDA+IHG-RTO: 30 — SIGNIFICANT CHANGE UP
MCHC RBC-ENTMCNC: 29.4 PG — SIGNIFICANT CHANGE UP (ref 27–31)
MCHC RBC-ENTMCNC: 30.2 G/DL — LOW (ref 32–37)
MCV RBC AUTO: 97.5 FL — SIGNIFICANT CHANGE UP (ref 81–99)
NRBC # BLD: 0 /100 WBCS — SIGNIFICANT CHANGE UP (ref 0–0)
PCO2 BLDA: 38 MMHG — SIGNIFICANT CHANGE UP (ref 38–42)
PCO2 BLDA: 54 MMHG — HIGH (ref 38–42)
PH BLDA: 7.32 — LOW (ref 7.38–7.42)
PH BLDA: 7.43 — HIGH (ref 7.38–7.42)
PLATELET # BLD AUTO: 115 K/UL — LOW (ref 130–400)
PO2 BLDA: 146 MMHG — HIGH (ref 78–95)
PO2 BLDA: 96 MMHG — HIGH (ref 78–95)
POTASSIUM SERPL-MCNC: 4.4 MMOL/L — SIGNIFICANT CHANGE UP (ref 3.5–5)
POTASSIUM SERPL-SCNC: 4.4 MMOL/L — SIGNIFICANT CHANGE UP (ref 3.5–5)
PROCALCITONIN SERPL-MCNC: 12.3 NG/ML — HIGH (ref 0.02–0.1)
PROT SERPL-MCNC: 4.6 G/DL — LOW (ref 6–8)
RBC # BLD: 3.67 M/UL — LOW (ref 4.2–5.4)
RBC # FLD: 14.8 % — HIGH (ref 11.5–14.5)
SAO2 % BLDA: 98 % — SIGNIFICANT CHANGE UP (ref 94–98)
SAO2 % BLDA: 99 % — HIGH (ref 94–98)
SODIUM SERPL-SCNC: 147 MMOL/L — HIGH (ref 135–146)
TSH SERPL-MCNC: 1.48 UIU/ML — SIGNIFICANT CHANGE UP (ref 0.27–4.2)
VIT B12 SERPL-MCNC: 414 PG/ML — SIGNIFICANT CHANGE UP (ref 232–1245)
WBC # BLD: 5.21 K/UL — SIGNIFICANT CHANGE UP (ref 4.8–10.8)
WBC # FLD AUTO: 5.21 K/UL — SIGNIFICANT CHANGE UP (ref 4.8–10.8)

## 2019-06-28 PROCEDURE — 95819 EEG AWAKE AND ASLEEP: CPT | Mod: 26

## 2019-06-28 PROCEDURE — 99232 SBSQ HOSP IP/OBS MODERATE 35: CPT

## 2019-06-28 PROCEDURE — 71045 X-RAY EXAM CHEST 1 VIEW: CPT | Mod: 26

## 2019-06-28 PROCEDURE — 99233 SBSQ HOSP IP/OBS HIGH 50: CPT

## 2019-06-28 RX ADMIN — Medication 3 MILLILITER(S): at 19:46

## 2019-06-28 RX ADMIN — Medication 3 MILLILITER(S): at 13:47

## 2019-06-28 RX ADMIN — CHLORHEXIDINE GLUCONATE 1 APPLICATION(S): 213 SOLUTION TOPICAL at 07:21

## 2019-06-28 RX ADMIN — HEPARIN SODIUM 5000 UNIT(S): 5000 INJECTION INTRAVENOUS; SUBCUTANEOUS at 17:05

## 2019-06-28 RX ADMIN — HEPARIN SODIUM 5000 UNIT(S): 5000 INJECTION INTRAVENOUS; SUBCUTANEOUS at 07:21

## 2019-06-28 RX ADMIN — Medication 3 MILLILITER(S): at 07:54

## 2019-06-28 NOTE — PROGRESS NOTE ADULT - SUBJECTIVE AND OBJECTIVE BOX
HPI:   87 y/o F w/ PMH of DLD, HTN, CHF, and recent hospitalization of PNA presented from home w/ fever and AMS. Patient is non-verbal.    INTERVAL HPI:  Patient was examined at bedside. Patient was sleeping with BIPAP mask, but was arousable and opened her eyes. Still non-verbal, but responded to directions and nodded. Attempted to vocalize something but wasn't understandable. Moved both upper extremities, and lower extremities a little. Much improvement versus yesterday since BIPAP.     Vital Signs Last 24 Hrs  T(C): 35.8 (28 Jun 2019 13:42), Max: 36.3 (27 Jun 2019 21:19)  T(F): 96.5 (28 Jun 2019 13:42), Max: 97.3 (27 Jun 2019 21:19)  HR: 68 (28 Jun 2019 16:02) (65 - 70)  BP: 118/58 (28 Jun 2019 13:42) (118/58 - 136/65)  BP(mean): --  RR: 20 (28 Jun 2019 13:42) (18 - 20)  SpO2: 100% (28 Jun 2019 16:02) (100% - 100%)    MEDICATIONS  (STANDING):  ALBUTerol/ipratropium for Nebulization 3 milliLiter(s) Nebulizer every 6 hours  atorvastatin 10 milliGRAM(s) Oral at bedtime  chlorhexidine 4% Liquid 1 Application(s) Topical <User Schedule>  dextrose 5% + sodium chloride 0.9%. 1000 milliLiter(s) (100 mL/Hr) IV Continuous <Continuous>  heparin  Injectable 5000 Unit(s) SubCutaneous every 12 hours  levoFLOXacin IVPB 250 milliGRAM(s) IV Intermittent every 48 hours    MEDICATIONS  (PRN):  acetaminophen   Tablet .. 650 milliGRAM(s) Oral every 6 hours PRN Temp greater or equal to 38C (100.4F)  dextrose 50% Injectable 50 milliLiter(s) IV Push once PRN If Blood glucose is LESS than 70    PHYSICAL EXAM:  General: Patient is laying comfortably in bed, arousable with eyes open NAD  Lungs: CTA B/L, no wheezing  Cardio: S1/S2, RRR, no murmurs   HEENT: Normocephalic, atraumatic  GI: NT/ND, soft, no guarding  Neuro: Patient is arousable, responded to verbal commands.    LABS:                        10.8   5.21  )-----------( 115      ( 28 Jun 2019 05:58 )             35.8   06-28    147<H>  |  115<H>  |  14  ----------------------------<  109<H>  4.4   |  26  |  0.5<L>    Ca    7.8<L>      28 Jun 2019 05:58    TPro  4.6<L>  /  Alb  2.6<L>  /  TBili  0.3  /  DBili  x   /  AST  20  /  ALT  25  /  AlkPhos  47  06-28    ABG - ( 28 Jun 2019 13:06 )  pH: 7.43<H> /  pCO2: 38    /  pO2: 146<H> / HCO3: 26    / Base Excess: 1.5   /  SaO2: 99<H> / Lactate: x        ABG - ( 28 Jun 2019 08:58 )  pH: 7.32<L> /  pCO2: 54<H> /  pO2: 96<H> / HCO3: 28<H> / Base Excess: 0.9   /  SaO2: 98    / Lactate: x        ABG - ( 27 Jun 2019 17:20 )  pH: 7.30<L> /  pCO2: 57<H> /  pO2: 101<H> / HCO3: 28<H> / Base Excess: 0.6   /  SaO2: 98    / Lactate: x        ABG - ( 27 Jun 2019 14:17 )  pH: 7.25<L> /  pCO2: 65<HH> /  pO2: 149<H> / HCO3: 28<H> / Base Excess: x     /  SaO2: 99<H> / Lactate: x        ABG - ( 27 Jun 2019 08:49 )  pH: 7.24<L> /  pCO2: 68<HH> /  pO2: 60<L> / HCO3: 29<H> / Base Excess: 0.5   /  SaO2: 91<L> / Lactate: x        POCT GLUCOSE:  POCT Blood Glucose.: 115 mg/dL <H> [70 - 99] (06-28-19)  POCT Blood Glucose.: 112 mg/dL <H> [70 - 99] (06-28-19)  POCT Blood Glucose.: 90 mg/dL [70 - 99] (06-28-19)  POCT Blood Glucose.: 93 mg/dL [70 - 99] (06-28-19)  POCT Blood Glucose.: 97 mg/dL [70 - 99] (06-27-19)  POCT Blood Glucose.: 92 mg/dL [70 - 99] (06-27-19)  POCT Blood Glucose.: 94 mg/dL [70 - 99] (06-27-19)  POCT Blood Glucose.: 157 mg/dL <H> [70 - 99] (06-27-19)  POCT Blood Glucose.: 87 mg/dL [70 - 99] (06-27-19)  POCT Blood Glucose.: 88 mg/dL [70 - 99] (06-26-19)  POCT Blood Glucose.: 119 mg/dL <H> [70 - 99] (06-26-19)  POCT Blood Glucose.: 73 mg/dL [70 - 99] (06-26-19)  POCT Blood Glucose.: 68 mg/dL <L> [70 - 99] (06-26-19)  POCT Blood Glucose.: 83 mg/dL [70 - 99] (06-25-19)    AMMONIA:  Ammonia, Serum (06.27.19 @ 12:12)    Ammonia, Serum: 59 umol/L    FOLATE:  Folate, Serum (06.27.19 @ 12:12)    Folate, Serum: 9.2: Mild hemolysis. Results may be falsely elevated. ng/mL    TSH  Thyroid Stimulating Hormone, Serum: 1.48 uIU/mL (06.27.19 @ 12:12)    B12  Vitamin B12, Serum: 414 pg/mL (06.27.19 @ 12:12)    MICROBIOLOGY:  Culture - Urine (06.25.19 @ 11:23)    Specimen Source: .Urine Catheterized    Culture Results:   No growth    Culture - Blood (06.25.19 @ 10:00)    Specimen Source: .Blood Blood-Peripheral    Culture Results:   No growth to date.    RADIOLOGY:    < from: Xray Chest 1 View- PORTABLE-Routine (06.28.19 @ 15:33) >  Impression:      Basilar effusions.    Follow-up as needed.    < end of copied text >    < from: Xray Chest 1 View- PORTABLE-Urgent (06.25.19 @ 10:39) >  Impression:      Left base opacity. Follow-up to resolution is recommended.    < from: CT Abdomen and Pelvis No Cont (06.25.19 @ 13:32) >  IMPRESSION:     1. Since September 13, 2017, no definite evidence of acute/inflammatory   process within the abdomen or pelvis.    2. Status post cholecystectomy.    < from: CT Head No Cont (06.25.19 @ 13:22) >  Impression:      No mass effect or intracranial hemorrhage.   POCT Glucose Trend  92 mg/dL06-27 @ 16:40  94 mg/dL06-27 @ 11:18  157 mg/dL06-27 @ 09:41  87 mg/dL06-27 @ 07:38    Chronic left basal ganglia lacunar infarction.    < from: US Kidney and Bladder (06.26.19 @ 10:58) >  IMPRESSION:    No hydronephrosis. Bilateral renal cysts as above.    Distended urinary bladder with estimated volume of 564 cc. The patient   was unable to void for this exam.    EEG:  < from: EEG Awake or Drowsy (06.27.19 @ 14:10) >  IMPRESSIONS  This is an abnormal routine EEG due to the presence of focal and   generalized slowing as described above    < end of copied text >

## 2019-06-28 NOTE — PHYSICAL THERAPY INITIAL EVALUATION ADULT - SPECIFY REASON(S)
PT Initial Evaluation attempted. Pt is on hold secondary to using BIPAP machine. PT will follow up appropriately at a later time.

## 2019-06-28 NOTE — CONSULT NOTE ADULT - SUBJECTIVE AND OBJECTIVE BOX
HPI:     86 y/f with PMHx of DLD, HTN, recent hospitalization for pneumonia presents here from home with reported history of fever .She was having fever x 1 day, she also seemed to be confused on day of presentation, was given Bactrim which was left over from her prior infection but she threw up after taking it.   In ED she was hypotensive to SBP 97, with elevated lactate. She was given 3400 ml of LR, after which her BP improved and lactate decreased. (25 Jun 2019 15:06)  Pulmonary has been consulted for evaluation of hypercapneic state as pt has been retaining CO2 on ABG.    Review of System:  See HPI    Allergies    No Known Allergies    Intolerances        Home Medications:  atorvastatin 10 mg oral tablet: 1 tab(s) orally once a day (06 Jun 2019 19:51)  enalapril 10 mg oral tablet: 1 tab(s) orally once a day (06 Jun 2019 19:51)  ferrous sulfate 325 mg (65 mg elemental iron) oral tablet: 1 tab(s) orally 2 times a day (06 Jun 2019 19:52)      SOCIAL HX:    Smoking            ETOH/illicit drugs             Occupation    PAST MEDICAL & SURGICAL HISTORY:  HLD (hyperlipidemia)  Diastolic dysfunction  HTN (hypertension)  History of cholecystectomy  H/O bilateral hip replacements: Right Hip ORIF on Xray      FAMILY HISTORY:  No pertinent family history in first degree relatives    No cardiovascular or pulmonary family history         PHYSICAL EXAM  Vital Signs Last 24 Hrs  T(C): 36.1 (28 Jun 2019 06:00), Max: 36.3 (27 Jun 2019 21:19)  T(F): 97 (28 Jun 2019 06:00), Max: 97.3 (27 Jun 2019 21:19)  HR: 65 (28 Jun 2019 06:00) (65 - 71)  BP: 135/60 (28 Jun 2019 06:00) (131/58 - 136/65)  BP(mean): --  RR: 18 (28 Jun 2019 06:00) (18 - 20)  SpO2: 100% (27 Jun 2019 18:18) (97% - 100%)    General: In NAD  HEENT: IBETH             Lymphatic system: No LN  appreciated  Lungs: Micha BS  Cardiovascular: Regular  Gastrointestinal: Soft.  + BS   Musculoskeletal: No Clubbing.  Moves all extremities  Skin: Warm.  Intact  Neurological: No motor or sensory deficit       LABS:                          10.8   5.21  )-----------( 115      ( 28 Jun 2019 05:58 )             35.8                                               06-28    147<H>  |  115<H>  |  14  ----------------------------<  109<H>  4.4   |  26  |  0.5<L>    Ca    7.8<L>      28 Jun 2019 05:58    TPro  4.6<L>  /  Alb  2.6<L>  /  TBili  0.3  /  DBili  x   /  AST  20  /  ALT  25  /  AlkPhos  47  06-28                                                                                           LIVER FUNCTIONS - ( 28 Jun 2019 05:58 )  Alb: 2.6 g/dL / Pro: 4.6 g/dL / ALK PHOS: 47 U/L / ALT: 25 U/L / AST: 20 U/L / GGT: x                                                  Culture - Urine (collected 25 Jun 2019 11:23)  Source: .Urine Catheterized  Final Report (26 Jun 2019 18:11):    No growth                                                ABG - ( 28 Jun 2019 08:58 )  pH, Arterial: 7.32  pH, Blood: x     /  pCO2: 54    /  pO2: 96    / HCO3: 28    / Base Excess: 0.9   /  SaO2: 98            ECHO < from: Transthoracic Echocardiogram (07.01.18 @ 08:44) >  1. Left ventricular ejection fraction, by visual estimation, is 65 to   70%.   2. Normal global left ventricular systolic function.   3. The left ventricular diastolic function could not be assessed in this   study.   4. Moderate septal left ventricular hypertrophy.   5. Mild-to-moderate mitral regurgitation.   6. Moderate mitral annular calcification.   7. Mild aortic stenosis.   8. Mild aortic regurgitation.   9. Moderate tricuspid regurgitation.  10. Estimated pulmonary artery systolic pressure is 60.7 mmHg assuming a   right atrial pressure of 8 mmHg, which is consistent with severe   pulmonary hypertension.    < end of copied text >      CXR reviewed: < from: Xray Chest 1 View- PORTABLE-Urgent (06.25.19 @ 10:39) >  Left base opacity. Follow-up to resolution is recommended..          < end of copied text >      MEDICATIONS  (STANDING):  ALBUTerol/ipratropium for Nebulization 3 milliLiter(s) Nebulizer every 6 hours  atorvastatin 10 milliGRAM(s) Oral at bedtime  chlorhexidine 4% Liquid 1 Application(s) Topical <User Schedule>  dextrose 5% + sodium chloride 0.9%. 1000 milliLiter(s) (100 mL/Hr) IV Continuous <Continuous>  heparin  Injectable 5000 Unit(s) SubCutaneous every 12 hours  levoFLOXacin IVPB 250 milliGRAM(s) IV Intermittent every 48 hours    MEDICATIONS  (PRN):  acetaminophen   Tablet .. 650 milliGRAM(s) Oral every 6 hours PRN Temp greater or equal to 38C (100.4F)  dextrose 50% Injectable 50 milliLiter(s) IV Push once PRN If Blood glucose is LESS than 70 HPI:     86 y/f with PMHx of DLD, HTN, recent hospitalization for pneumonia presents here from home with reported history of fever .She was having fever x 1 day, she also seemed to be confused on day of presentation, was given Bactrim which was left over from her prior infection but she threw up after taking it.   In ED she was hypotensive to SBP 97, with elevated lactate. She was given 3400 ml of LR, after which her BP improved and lactate decreased. (25 Jun 2019 15:06)  Pulmonary has been consulted for evaluation of hypercapneic state as pt has been retaining CO2 on ABG.  Pt. seen at bedside, currently awake, on BIPAP, not in acute distress and nodding her head upon questioning if she feels better and on every further question. pt does not know if she has underlying hx of copd or any other lung condition but admits smoking actively. Denies other symptoms. Her mental status has improved since presentation.    Review of System:  See HPI    Allergies    No Known Allergies    Intolerances        Home Medications:  atorvastatin 10 mg oral tablet: 1 tab(s) orally once a day (06 Jun 2019 19:51)  enalapril 10 mg oral tablet: 1 tab(s) orally once a day (06 Jun 2019 19:51)  ferrous sulfate 325 mg (65 mg elemental iron) oral tablet: 1 tab(s) orally 2 times a day (06 Jun 2019 19:52)      SOCIAL HX:    Smoking    : Current smoker. Unknown duration and quantity        ETOH/illicit drugs           : denies    PAST MEDICAL & SURGICAL HISTORY:  HLD (hyperlipidemia)  Diastolic dysfunction  HTN (hypertension)  History of cholecystectomy  H/O bilateral hip replacements: Right Hip ORIF on Xray      FAMILY HISTORY:  No pertinent family history in first degree relatives    No cardiovascular or pulmonary family history         PHYSICAL EXAM  Vital Signs Last 24 Hrs  T(C): 36.1 (28 Jun 2019 06:00), Max: 36.3 (27 Jun 2019 21:19)  T(F): 97 (28 Jun 2019 06:00), Max: 97.3 (27 Jun 2019 21:19)  HR: 65 (28 Jun 2019 06:00) (65 - 71)  BP: 135/60 (28 Jun 2019 06:00) (131/58 - 136/65)  BP(mean): --  RR: 18 (28 Jun 2019 06:00) (18 - 20)  SpO2: 100% (27 Jun 2019 18:18) (97% - 100%)    General: on Bipap, NAD  HEENT: IBETH             Lungs: B/l scattered rhonchi, ?occasional wheeze  Cardiovascular: Regular  Gastrointestinal: Soft.  + BS   Musculoskeletal: No Clubbing.  Moves all extremities  Skin: Warm.  Intact  Neurological: No motor or sensory deficit       LABS:                          10.8   5.21  )-----------( 115      ( 28 Jun 2019 05:58 )             35.8                                               06-28    147<H>  |  115<H>  |  14  ----------------------------<  109<H>  4.4   |  26  |  0.5<L>    Ca    7.8<L>      28 Jun 2019 05:58    TPro  4.6<L>  /  Alb  2.6<L>  /  TBili  0.3  /  DBili  x   /  AST  20  /  ALT  25  /  AlkPhos  47  06-28        LIVER FUNCTIONS - ( 28 Jun 2019 05:58 )  Alb: 2.6 g/dL / Pro: 4.6 g/dL / ALK PHOS: 47 U/L / ALT: 25 U/L / AST: 20 U/L / GGT: x                                                  Culture - Urine (collected 25 Jun 2019 11:23)  Source: .Urine Catheterized  Final Report (26 Jun 2019 18:11):    No growth    ABG - ( 28 Jun 2019 08:58 )  pH, Arterial: 7.32  pH, Blood: x     /  pCO2: 54    /  pO2: 96    / HCO3: 28    / Base Excess: 0.9   /  SaO2: 98        ABG TREND:  ABG - ( 27 Jun 2019 17:20 )  pH: 7.30<L> /  pCO2: 57<H> /  pO2: 101<H> / HCO3: 28<H> / Base Excess: 0.6   /  SaO2: 98    / Lactate: x        ABG - ( 27 Jun 2019 14:17 )  pH: 7.25<L> /  pCO2: 65<HH> /  pO2: 149<H> / HCO3: 28<H> / Base Excess: x     /  SaO2: 99<H> / Lactate: x        ABG - ( 27 Jun 2019 08:49 )  pH: 7.24<L> /  pCO2: 68<HH> /  pO2: 60<L> / HCO3: 29<H> / Base Excess: 0.5   /  SaO2: 91<L> / Lactate: x            ECHO < from: Transthoracic Echocardiogram (07.01.18 @ 08:44) >  1. Left ventricular ejection fraction, by visual estimation, is 65 to   70%.   2. Normal global left ventricular systolic function.   3. The left ventricular diastolic function could not be assessed in this   study.   4. Moderate septal left ventricular hypertrophy.   5. Mild-to-moderate mitral regurgitation.   6. Moderate mitral annular calcification.   7. Mild aortic stenosis.   8. Mild aortic regurgitation.   9. Moderate tricuspid regurgitation.  10. Estimated pulmonary artery systolic pressure is 60.7 mmHg assuming a   right atrial pressure of 8 mmHg, which is consistent with severe   pulmonary hypertension.    < end of copied text >      CXR reviewed: < from: Xray Chest 1 View- PORTABLE-Urgent (06.25.19 @ 10:39) >  Left base opacity. Follow-up to resolution is recommended..          < end of copied text >      MEDICATIONS  (STANDING):  ALBUTerol/ipratropium for Nebulization 3 milliLiter(s) Nebulizer every 6 hours  atorvastatin 10 milliGRAM(s) Oral at bedtime  chlorhexidine 4% Liquid 1 Application(s) Topical <User Schedule>  dextrose 5% + sodium chloride 0.9%. 1000 milliLiter(s) (100 mL/Hr) IV Continuous <Continuous>  heparin  Injectable 5000 Unit(s) SubCutaneous every 12 hours  levoFLOXacin IVPB 250 milliGRAM(s) IV Intermittent every 48 hours    MEDICATIONS  (PRN):  acetaminophen   Tablet .. 650 milliGRAM(s) Oral every 6 hours PRN Temp greater or equal to 38C (100.4F)  dextrose 50% Injectable 50 milliLiter(s) IV Push once PRN If Blood glucose is LESS than 70

## 2019-06-28 NOTE — PROGRESS NOTE ADULT - SUBJECTIVE AND OBJECTIVE BOX
MARLENA KYRA  [facility] ([facility])            Patient was evaluated and examined  by bedside, more awake today with BIPAP tx, remains confused, has good urine output, no fever.        REVIEW OF SYSTEMS:  unable to obtain due to patient's confusion state      T(C): , Max: 36.3 (06-27-19 @ 21:19)  HR: 65 (06-28-19 @ 06:00)  BP: 135/60 (06-28-19 @ 06:00)  RR: 18 (06-28-19 @ 06:00)  SpO2: 100% (06-27-19 @ 18:18)  CAPILLARY BLOOD GLUCOSE      POCT Blood Glucose.: 112 mg/dL (28 Jun 2019 11:19)  POCT Blood Glucose.: 90 mg/dL (28 Jun 2019 07:36)  POCT Blood Glucose.: 93 mg/dL (28 Jun 2019 02:32)  POCT Blood Glucose.: 97 mg/dL (27 Jun 2019 20:55)  POCT Blood Glucose.: 92 mg/dL (27 Jun 2019 16:40)      PHYSICAL EXAM:  GENERAL: NAD, awake, confused, patient is laying comfortably in bed  HEENT: AT, NC, SUPPLE, NO JVD, NO CB  EYES:  pupils with reaction to direct light stimulation b/l  LUNG: good breath sounds B/L  CVS: normal S1, S2, RRR, NO M/G/R  ABDOMEN: soft, bowel sounds present, normoactive in all 4 quadrants, non-tender, non-distended  EXT: no E/C/C, positive PP b/l extremities  NEURO: patient is awake, confused, not following  verbal commands  SKIN: no rash        LAB  CBC  Date: 06-28-19 @ 05:58  Mean cell Boebnwxxgo80.4  Mean cell Hemoglobin Conc30.2  Mean cell Volum 97.5  Platelet count-Automate 115  RBC Count 3.67  Red Cell Distrib Width14.8  WBC Count5.21  % Albumin, Urine--  Hematocrit 35.8  Hemoglobin 10.8  CBC  Date: 06-27-19 @ 08:13  Mean cell Cerbebstiy12.8  Mean cell Hemoglobin Conc30.0  Mean cell Volum 99.2  Platelet count-Automate 129  RBC Count 3.86  Red Cell Distrib Width15.3  WBC Count4.96  % Albumin, Urine--  Hematocrit 38.3  Hemoglobin 11.5  CBC  Date: 06-26-19 @ 06:14  Mean cell Crrpyknsmf59.9  Mean cell Hemoglobin Conc31.1  Mean cell Volum 96.1  Platelet count-Automate 133  RBC Count 3.61  Red Cell Distrib Width15.0  WBC Count4.55  % Albumin, Urine--  Hematocrit 34.7  Hemoglobin 10.8  CBC  Date: 06-25-19 @ 10:00  Mean cell Gccokilcbp92.9  Mean cell Hemoglobin Conc31.3  Mean cell Volum 95.4  Platelet count-Automate 195  RBC Count 4.38  Red Cell Distrib Width15.0  WBC Count7.65  % Albumin, Urine--  Hematocrit 41.8  Hemoglobin 13.1    [facility]  06-28-19 @ 05:58  Blood Gas Arterial-Calcium,Ionized--  Blood Urea Nitrogen, Serum 14 mg/dL [10 - 20]  Carbon Dioxide, Serum26 mmol/L [17 - 32]  Chloride, Dwtte019 mmol/L<H> [98 - 110]  Creatinie, Serum0.5 mg/dL<L> [0.7 - 1.5]  Glucose, Rrbzk041 mg/dL<H> [70 - 99]  Potassium, Serum4.4 mmol/L [3.5 - 5.0]  Sodium, Serum 147 mmol/L<H> [135 - 146]  [facility]  06-27-19 @ 08:49  Blood Gas Arterial-Calcium,Ionized1.25 mmoL/L [1.12 - 1.30] [given to Dr. Fox at 6007. 3 lpm cannula]  Blood Urea Nitrogen, Serum --  Carbon Dioxide, Serum--  Chloride, Serum--  Creatinie, Serum--  Glucose, Serum--  Potassium, Serum--  Sodium, Serum --  [facility]  06-27-19 @ 08:13  Blood Gas Arterial-Calcium,Ionized--  Blood Urea Nitrogen, Serum 18 mg/dL [10 - 20]  Carbon Dioxide, Serum26 mmol/L [17 - 32]  Chloride, Mdamm667 mmol/L<H> [98 - 110]  Creatinie, Serum0.7 mg/dL [0.7 - 1.5]  Glucose, Serum89 mg/dL [70 - 99]  Potassium, Serum4.8 mmol/L [3.5 - 5.0]  Sodium, Serum 146 mmol/L [135 - 146]  [facility]  06-26-19 @ 06:14  Blood Gas Arterial-Calcium,Ionized--  Blood Urea Nitrogen, Serum 33 mg/dL<H> [10 - 20]  Carbon Dioxide, Serum25 mmol/L [17 - 32]  Chloride, Dfuyp961 mmol/L [98 - 110]  Creatinie, Serum1.0 mg/dL [0.7 - 1.5]  Glucose, Serum84 mg/dL [70 - 99]  Potassium, Serum4.5 mmol/L [3.5 - 5.0]  Sodium, Serum 142 mmol/L [135 - 146]  [facility]  06-25-19 @ 10:00  Blood Gas Arterial-Calcium,Ionized--  Blood Urea Nitrogen, Serum 52 mg/dL<H> [10 - 20]  Carbon Dioxide, Serum23 mmol/L [17 - 32]  Chloride, Jwrer163 mmol/L [98 - 110]  Creatinie, Serum1.9 mg/dL<H> [0.7 - 1.5]  Glucose, Serum97 mg/dL [70 - 99]  Potassium, Serum5.0 mmol/L [3.5 - 5.0] [Slighty Hemolyzed use with Caution]  Sodium, Serum 139 mmol/L [135 - 146]              Microbiology:    Culture - Urine (collected 06-25-19 @ 11:23)  Source: .Urine Catheterized  Final Report (06-26-19 @ 18:11):    No growth    Culture - Blood (collected 06-25-19 @ 10:00)  Source: .Blood Blood-Peripheral  Preliminary Report (06-26-19 @ 17:01):    No growth to date.    Culture - Blood (collected 06-25-19 @ 10:00)  Source: .Blood Blood-Peripheral  Preliminary Report (06-26-19 @ 17:01):    No growth to date.    Culture - Urine (collected 06-06-19 @ 16:45)  Source: .Urine Clean Catch (Midstream)  Final Report (06-07-19 @ 22:56):    <10,000 CFU/mL Normal Urogenital Meghana    Culture - Blood (collected 06-06-19 @ 14:54)  Source: .Blood Blood-Peripheral  Final Report (06-12-19 @ 02:01):    No growth at 5 days.    Culture - Blood (collected 06-06-19 @ 14:54)  Source: .Blood Blood-Peripheral  Final Report (06-12-19 @ 02:01):    No growth at 5 days.        Medications:  acetaminophen   Tablet .. 650 milliGRAM(s) Oral every 6 hours PRN  ALBUTerol/ipratropium for Nebulization 3 milliLiter(s) Nebulizer every 6 hours  atorvastatin 10 milliGRAM(s) Oral at bedtime  chlorhexidine 4% Liquid 1 Application(s) Topical <User Schedule>  dextrose 5% + sodium chloride 0.9%. 1000 milliLiter(s) IV Continuous <Continuous>  dextrose 50% Injectable 50 milliLiter(s) IV Push once PRN  heparin  Injectable 5000 Unit(s) SubCutaneous every 12 hours  levoFLOXacin IVPB 250 milliGRAM(s) IV Intermittent every 48 hours        Assessment and Plan:  - Lethargic state- most likely as a result of hypercapnic respiratory failure  no metabolic encephalopathy, recent blood cxs- no bacterial growth,   - repeated  blood and urine cxs- no bacterial growth  -no leukocytosis on admission.  -CT head was completed on admission , showed old CVA, no acute changes, patient was evaluated by  Neurology specialist ,  - s/p  EEG- no seizure like activity  -obtain MRI of Brain to r/o new CVA  - continue IVF- D5NS@100ml/hr  -bsfs monitoring  -neuro assessments every shift.    -borderline low bsfs- 70-80  range- continue dextrose infusion tx. with close bsfs monitoring    -Acute hypercapneic respiratory failure- acute Respiratory acidosis-  continue  pt. on BIPAP- repeat ABG today post BIPAP setting adjustment  -monitor pulse ox level  -duonebs tx.  - repeat CXR today    -Acute Urinary retention- seen on renal/bladder US- inserted vázquez, patient has good urine output,  - monitor I and O strictly    -Recently tx. pneumonia- pt. completed tx., CXR- persistent left basilar opacity- no new pneumonia   - underlying neoplasm can not be r/o, patient will need outpatient CT chest to be repeated in 1.5 months to assess left lung opacitiy.    -KATINA- most likely due to urinary retention, resolved  continue IVF    -h/o HTN, due to hypotension, continue to hold all b/p meds, IVF    diet: keep pt. NPO till she is off BIPAP tx.    daily GI and DVT proph.       #Progress Note Handoff: Pending Consults___Pulmonary ______,Tests___MRI of brain,_____,Test Results__ABG _____, other: monitor neuro status  Family discussion: yes Disposition: to be determined

## 2019-06-28 NOTE — PROGRESS NOTE ADULT - SUBJECTIVE AND OBJECTIVE BOX
Follow up neurology consult note    Patient is responsive today, able to answer questions with one word answers. Still on BIPAP, hemodynamically stable. She reports no pain or discomfort and she is currently afebrile. No family at bedside.     Home Medications:  atorvastatin 10 mg oral tablet: 1 tab(s) orally once a day (06 Jun 2019 19:51)  enalapril 10 mg oral tablet: 1 tab(s) orally once a day (06 Jun 2019 19:51)  ferrous sulfate 325 mg (65 mg elemental iron) oral tablet: 1 tab(s) orally 2 times a day (06 Jun 2019 19:52)    MEDICATIONS  (STANDING):  ALBUTerol/ipratropium for Nebulization 3 milliLiter(s) Nebulizer every 6 hours  atorvastatin 10 milliGRAM(s) Oral at bedtime  chlorhexidine 4% Liquid 1 Application(s) Topical <User Schedule>  dextrose 5% + sodium chloride 0.9%. 1000 milliLiter(s) (100 mL/Hr) IV Continuous <Continuous>  heparin  Injectable 5000 Unit(s) SubCutaneous every 12 hours  levoFLOXacin IVPB 250 milliGRAM(s) IV Intermittent every 48 hours    MEDICATIONS  (PRN):  acetaminophen   Tablet .. 650 milliGRAM(s) Oral every 6 hours PRN Temp greater or equal to 38C (100.4F)  dextrose 50% Injectable 50 milliLiter(s) IV Push once PRN If Blood glucose is LESS than 70    Vital Signs Last 24 Hrs  T(C): 36.1 (28 Jun 2019 06:00), Max: 36.3 (27 Jun 2019 21:19)  T(F): 97 (28 Jun 2019 06:00), Max: 97.3 (27 Jun 2019 21:19)  HR: 65 (28 Jun 2019 06:00) (65 - 71)  BP: 135/60 (28 Jun 2019 06:00) (131/58 - 136/65)  BP(mean): --  RR: 18 (28 Jun 2019 06:00) (18 - 20)  SpO2: 100% (27 Jun 2019 18:18) (97% - 100%)                           10.8   5.21  )-----------( 115      ( 28 Jun 2019 05:58 )             35.8         147<H>  |  115<H>  |  14  ----------------------------<  109<H>  4.4   |  26  |  0.5<L>    Ca    7.8<L>          ABG - ( 28 Jun 2019 08:58 )  pH, Arterial: 7.32  pH, Blood: x     /  pCO2: 54    /  pO2: 96    / HCO3: 28    / Base Excess: 0.9   /  SaO2: 98        TPro  4.6<L>  /  Alb  2.6<L>  /  TBili  0.3  /  DBili  x   /  AST  20  /  ALT  25  /  AlkPhos  47     CT HEAD:  < from: CT Head No Cont (06.25.19 @ 13:22) >  Impression:      No mass effect or intracranial hemorrhage.     Chronic left basal ganglia lacunar infarction.    EEG:           PHYSICAL EXAM:  Patient is on BIPAP    General: Alert, able to communicate  Lungs: Mild decreased air entry appreciated on left lower lobe. No wheezing heard  Cardio: S1/S2, RRR, no murmurs   HEENT: Normocephalic, atraumatic. PERRL  GI: NT/ND, soft, no guarding  Neuro: Alert, able to answer questions with 1 word answers. Follows command Follow up neurology consult note    Patient is responsive today, able to answer questions with one word answers. Still on BIPAP, hemodynamically stable. She reports no pain or discomfort and she is currently afebrile. No family at bedside.     Home Medications:  atorvastatin 10 mg oral tablet: 1 tab(s) orally once a day (06 Jun 2019 19:51)  enalapril 10 mg oral tablet: 1 tab(s) orally once a day (06 Jun 2019 19:51)  ferrous sulfate 325 mg (65 mg elemental iron) oral tablet: 1 tab(s) orally 2 times a day (06 Jun 2019 19:52)    MEDICATIONS  (STANDING):  ALBUTerol/ipratropium for Nebulization 3 milliLiter(s) Nebulizer every 6 hours  atorvastatin 10 milliGRAM(s) Oral at bedtime  chlorhexidine 4% Liquid 1 Application(s) Topical <User Schedule>  dextrose 5% + sodium chloride 0.9%. 1000 milliLiter(s) (100 mL/Hr) IV Continuous <Continuous>  heparin  Injectable 5000 Unit(s) SubCutaneous every 12 hours  levoFLOXacin IVPB 250 milliGRAM(s) IV Intermittent every 48 hours    MEDICATIONS  (PRN):  acetaminophen   Tablet .. 650 milliGRAM(s) Oral every 6 hours PRN Temp greater or equal to 38C (100.4F)  dextrose 50% Injectable 50 milliLiter(s) IV Push once PRN If Blood glucose is LESS than 70    Vital Signs Last 24 Hrs  T(C): 36.1 (28 Jun 2019 06:00), Max: 36.3 (27 Jun 2019 21:19)  T(F): 97 (28 Jun 2019 06:00), Max: 97.3 (27 Jun 2019 21:19)  HR: 65 (28 Jun 2019 06:00) (65 - 71)  BP: 135/60 (28 Jun 2019 06:00) (131/58 - 136/65)  BP(mean): --  RR: 18 (28 Jun 2019 06:00) (18 - 20)  SpO2: 100% (27 Jun 2019 18:18) (97% - 100%)                           10.8   5.21  )-----------( 115      ( 28 Jun 2019 05:58 )             35.8         147<H>  |  115<H>  |  14  ----------------------------<  109<H>  4.4   |  26  |  0.5<L>    Ca    7.8<L>          ABG - ( 28 Jun 2019 08:58 )  pH, Arterial: 7.32  pH, Blood: x     /  pCO2: 54    /  pO2: 96    / HCO3: 28    / Base Excess: 0.9   /  SaO2: 98        TPro  4.6<L>  /  Alb  2.6<L>  /  TBili  0.3  /  DBili  x   /  AST  20  /  ALT  25  /  AlkPhos  47     CT HEAD:  < from: CT Head No Cont (06.25.19 @ 13:22) >  Impression:      No mass effect or intracranial hemorrhage.     Chronic left basal ganglia lacunar infarction.      EEG: Generalized slowing.           PHYSICAL EXAM:  Patient is on BIPAP    General: Alert, able to communicate  Lungs: Mild decreased air entry appreciated on left lower lobe. No wheezing heard  Cardio: S1/S2, RRR, no murmurs   HEENT: Normocephalic, atraumatic. PERRL  GI: NT/ND, soft, no guarding  Neuro: Alert, able to answer questions with 1 word answers. Follows command

## 2019-06-28 NOTE — PROGRESS NOTE ADULT - ASSESSMENT
Assessment and Plan:   · Assessment		  Assessment:  85 y/o F w/ PMH of DLD, HTN, CHF, and recent hospitalization of PNA presented from home w/ fever and AMS. Patient is non-verbal.    #Hypercapnic Encephalopathy- improved  - First blood gas revealed pH of 7.24, pCO2 of 68. Trending up toward pH of 7.43 and PCO2: 38  - Repeating ABG in AM  - Metabolic encephalopathy less likely d/t - BCX/UCX negative- No leukocytosis  - Ammonia elevated (59)  - EEG showed no seizure activity  - Folate/B12/TSH all WNL  - BiPAP  - F/U Brain MR  - Monitor pulse ox  - CXR shows basilar effusions  - Pulmonary consult recommended weaning off BIPAP, albuterol nebulizers, IV steroids, outpatient PFT's, and GOC discussion    #Hypoglycemia?  - NPO as per speech pathology recommendations  - IVF D5NS@100ml/hr  - POCT reveals low/lower limit of normal blood glucose levels, even with D50 infusion  - Underlying neoplasm (i.e. insulinoma)?  - Infuse dextrose if <80   - F/U Cortisol    #Urinary Retention (acute)  - Renal/Bladder ultrasound revealed retention (422ml)  - Inserted vázquez, with good urine output  - Monitor I&O  - Ammonia level elevated (59)  - F/U bladder scan, if retaining - renew vázquez    #DVT PPX  - Lovenox    #Diet  - NPO Assessment and Plan:   · Assessment		  Assessment:  85 y/o F w/ PMH of DLD, HTN, CHF, and recent hospitalization of PNA presented from home w/ fever and AMS. Patient is non-verbal.    #Hypercapnic Encephalopathy- improved  - First blood gas revealed pH of 7.24, pCO2 of 68. Trending up toward pH of 7.43 and PCO2: 38  - Repeating ABG in AM  - Metabolic encephalopathy less likely d/t - BCX/UCX negative- No leukocytosis  - Ammonia elevated (59)  - EEG showed no seizure activity  - Folate/B12/TSH all WNL  - BiPAP  - F/U Brain MR  - Monitor pulse ox  - CXR shows basilar effusions  - Pulmonary consult recommended weaning off BIPAP, albuterol nebulizers, IV steroids, outpatient PFT's, and GOC discussion    #Hypoglycemia?  - NPO as per speech pathology recommendations  - IVF D5NS@100ml/hr  - POCT reveals low/lower limit of normal blood glucose levels, even with D50 infusion  - Underlying neoplasm (i.e. insulinoma)?  - Infuse dextrose if <80   - F/U Cortisol    #Urinary Retention (acute)  - Renal/Bladder ultrasound revealed retention  - Inserted vázquez, with good urine output  - Monitor I&O  - Ammonia level elevated (59)    #DVT PPX  - Lovenox    #Diet  - NPO

## 2019-06-28 NOTE — CONSULT NOTE ADULT - ASSESSMENT
Impression    Acute hypercapneic respiratory failure  Acute Encephalopathy  Sepsis Impression    Acute hypercapneic respiratory failure  Acute Encephalopathy likely due to co2 Narcosis  Sepsis    -- currently on Bipap , c/w same. Use bipap prn and at night, repeat ABG   -- Albuterol nebs prn  -- Incentive spirometry  -- correct electrolytes  -- Need sleep studies and PFTs as outpt to establish diagnosis for co2 retention    Will discuss with Pulmonary team Impression    Acute hypercapneic respiratory failure  Acute Encephalopathy likely due to co2 Narcosis  Sepsis    -- currently on Bipap ,repeat ABG findings improving-->wean off BIPAP  -- Albuterol nebs prn  -- Iv steroids  60 mg q12h  -- correct electrolytes  -- Need sleep studies and PFTs as outpt to establish diagnosis for co2 retention  -- [facility] discussion-->palliative eval

## 2019-06-28 NOTE — PROGRESS NOTE ADULT - ASSESSMENT
86 year old female with a history of HTN, DLD, diastolic dysfunction and a recent admission for UTI and pneumonia (discharged on Eduardo 10), presenting for fever and altered mental status. We were consulted for AMS. On admission she had CXR findings of LLL consolidation, and CO2 retention on ABGs (68).     Neuro: AMS is likely due to a combination of infection related encephalopathy and CO2 narcosis. The CXR findings could be residual from her previous pneumonia, but in the setting of elderly female and documented fever (101 in ED), an active infection cannot be ruled out.     PLAN  (1) would recommend continuing antibiotics.   (2)CO2 levels improving slightly (today is 54). Continue BIPAP and adjust settings per fiollow up ABGs and clinically indicated    Endo: Patient had an episode on hypoglycemia This could also contribute to her AMS. However in the setting on BIPAP, we cannot feed her.     PLAN  (1) Keep NPO and give dextrose by IV. 86 year old female with a history of HTN, DLD, diastolic dysfunction and a recent admission for UTI and pneumonia (discharged on Eduardo 10), presenting for fever and altered mental status. We were consulted for AMS. On admission she had CXR findings of LLL consolidation, and CO2 retention on ABGs (68).     Neuro: AMS is likely due to a combination of infection related encephalopathy and CO2 narcosis. The CXR findings could be residual from her previous pneumonia, but in the setting of elderly female and documented fever (101 in ED), an active infection cannot be ruled out. EEG showed non-specific generalized slowing with no epileptiform activity so seizure is low on the differential diagnosis for the patients AMS.     PLAN  (1) would recommend continuing antibiotics.   (2)CO2 levels improving slightly (today is 54). Continue BIPAP and adjust settings per fiollow up ABGs and clinically indicated  (3) MRI brain    Endo: Patient had an episode on hypoglycemia This could also contribute to her AMS. However in the setting on BIPAP, we cannot feed her.     PLAN  (1) Keep NPO and give dextrose by IV.

## 2019-06-29 LAB
ALBUMIN SERPL ELPH-MCNC: 2.3 G/DL — LOW (ref 3.5–5.2)
ALP SERPL-CCNC: 50 U/L — SIGNIFICANT CHANGE UP (ref 30–115)
ALT FLD-CCNC: 17 U/L — SIGNIFICANT CHANGE UP (ref 0–41)
ANION GAP SERPL CALC-SCNC: 8 MMOL/L — SIGNIFICANT CHANGE UP (ref 7–14)
ANION GAP SERPL CALC-SCNC: 8 MMOL/L — SIGNIFICANT CHANGE UP (ref 7–14)
AST SERPL-CCNC: 13 U/L — SIGNIFICANT CHANGE UP (ref 0–41)
BILIRUB SERPL-MCNC: 0.3 MG/DL — SIGNIFICANT CHANGE UP (ref 0.2–1.2)
BUN SERPL-MCNC: 11 MG/DL — SIGNIFICANT CHANGE UP (ref 10–20)
BUN SERPL-MCNC: 12 MG/DL — SIGNIFICANT CHANGE UP (ref 10–20)
CALCIUM SERPL-MCNC: 7.2 MG/DL — LOW (ref 8.5–10.1)
CALCIUM SERPL-MCNC: 7.5 MG/DL — LOW (ref 8.5–10.1)
CHLORIDE SERPL-SCNC: 117 MMOL/L — HIGH (ref 98–110)
CHLORIDE SERPL-SCNC: 119 MMOL/L — HIGH (ref 98–110)
CO2 SERPL-SCNC: 24 MMOL/L — SIGNIFICANT CHANGE UP (ref 17–32)
CO2 SERPL-SCNC: 25 MMOL/L — SIGNIFICANT CHANGE UP (ref 17–32)
CREAT SERPL-MCNC: 0.5 MG/DL — LOW (ref 0.7–1.5)
CREAT SERPL-MCNC: 0.5 MG/DL — LOW (ref 0.7–1.5)
GAS PNL BLDA: SIGNIFICANT CHANGE UP
GLUCOSE BLDC GLUCOMTR-MCNC: 84 MG/DL — SIGNIFICANT CHANGE UP (ref 70–99)
GLUCOSE BLDC GLUCOMTR-MCNC: 88 MG/DL — SIGNIFICANT CHANGE UP (ref 70–99)
GLUCOSE BLDC GLUCOMTR-MCNC: 90 MG/DL — SIGNIFICANT CHANGE UP (ref 70–99)
GLUCOSE BLDC GLUCOMTR-MCNC: 97 MG/DL — SIGNIFICANT CHANGE UP (ref 70–99)
GLUCOSE SERPL-MCNC: 90 MG/DL — SIGNIFICANT CHANGE UP (ref 70–99)
GLUCOSE SERPL-MCNC: 98 MG/DL — SIGNIFICANT CHANGE UP (ref 70–99)
HCT VFR BLD CALC: 33.5 % — LOW (ref 37–47)
HGB BLD-MCNC: 10.3 G/DL — LOW (ref 12–16)
MCHC RBC-ENTMCNC: 29.4 PG — SIGNIFICANT CHANGE UP (ref 27–31)
MCHC RBC-ENTMCNC: 30.7 G/DL — LOW (ref 32–37)
MCV RBC AUTO: 95.7 FL — SIGNIFICANT CHANGE UP (ref 81–99)
NRBC # BLD: 0 /100 WBCS — SIGNIFICANT CHANGE UP (ref 0–0)
PLATELET # BLD AUTO: 118 K/UL — LOW (ref 130–400)
POTASSIUM SERPL-MCNC: 3.9 MMOL/L — SIGNIFICANT CHANGE UP (ref 3.5–5)
POTASSIUM SERPL-MCNC: 4.3 MMOL/L — SIGNIFICANT CHANGE UP (ref 3.5–5)
POTASSIUM SERPL-SCNC: 3.9 MMOL/L — SIGNIFICANT CHANGE UP (ref 3.5–5)
POTASSIUM SERPL-SCNC: 4.3 MMOL/L — SIGNIFICANT CHANGE UP (ref 3.5–5)
PROT SERPL-MCNC: 4.3 G/DL — LOW (ref 6–8)
RBC # BLD: 3.5 M/UL — LOW (ref 4.2–5.4)
RBC # FLD: 14.6 % — HIGH (ref 11.5–14.5)
SODIUM SERPL-SCNC: 149 MMOL/L — HIGH (ref 135–146)
SODIUM SERPL-SCNC: 152 MMOL/L — HIGH (ref 135–146)
WBC # BLD: 3.91 K/UL — LOW (ref 4.8–10.8)
WBC # FLD AUTO: 3.91 K/UL — LOW (ref 4.8–10.8)

## 2019-06-29 PROCEDURE — 70551 MRI BRAIN STEM W/O DYE: CPT | Mod: 26

## 2019-06-29 PROCEDURE — 99233 SBSQ HOSP IP/OBS HIGH 50: CPT

## 2019-06-29 RX ORDER — SODIUM CHLORIDE 9 MG/ML
1000 INJECTION INTRAMUSCULAR; INTRAVENOUS; SUBCUTANEOUS ONCE
Refills: 0 | Status: COMPLETED | OUTPATIENT
Start: 2019-06-29 | End: 2019-06-29

## 2019-06-29 RX ORDER — SODIUM CHLORIDE 9 MG/ML
1000 INJECTION, SOLUTION INTRAVENOUS
Refills: 0 | Status: DISCONTINUED | OUTPATIENT
Start: 2019-06-29 | End: 2019-07-09

## 2019-06-29 RX ADMIN — SODIUM CHLORIDE 100 MILLILITER(S): 9 INJECTION, SOLUTION INTRAVENOUS at 14:20

## 2019-06-29 RX ADMIN — HEPARIN SODIUM 5000 UNIT(S): 5000 INJECTION INTRAVENOUS; SUBCUTANEOUS at 17:28

## 2019-06-29 RX ADMIN — CHLORHEXIDINE GLUCONATE 1 APPLICATION(S): 213 SOLUTION TOPICAL at 05:09

## 2019-06-29 RX ADMIN — Medication 3 MILLILITER(S): at 20:41

## 2019-06-29 RX ADMIN — SODIUM CHLORIDE 1000 MILLILITER(S): 9 INJECTION INTRAMUSCULAR; INTRAVENOUS; SUBCUTANEOUS at 17:29

## 2019-06-29 RX ADMIN — Medication 3 MILLILITER(S): at 09:24

## 2019-06-29 RX ADMIN — SODIUM CHLORIDE 100 MILLILITER(S): 9 INJECTION, SOLUTION INTRAVENOUS at 05:09

## 2019-06-29 RX ADMIN — HEPARIN SODIUM 5000 UNIT(S): 5000 INJECTION INTRAVENOUS; SUBCUTANEOUS at 05:09

## 2019-06-29 NOTE — PROGRESS NOTE ADULT - SUBJECTIVE AND OBJECTIVE BOX
MARLENA KYRA  [id number]            Patient was evaluated and examined  by bedside, remains confused, more awake today, was on BIPAP at night  and switched to NC in am, no dyspnea at rest, no cough , no fever      REVIEW OF SYSTEMS:  unable to obtain due to patient's confusion state    T(C): , Max: 36.3 (06-28-19 @ 20:18)  HR: 70 (06-29-19 @ 06:14)  BP: 144/60 (06-29-19 @ 06:14)  RR: 18 (06-29-19 @ 05:26)  SpO2: 100% (06-29-19 @ 05:26)  CAPILLARY BLOOD GLUCOSE      POCT Blood Glucose.: 84 mg/dL (29 Jun 2019 11:26)  POCT Blood Glucose.: 97 mg/dL (29 Jun 2019 07:42)  POCT Blood Glucose.: 105 mg/dL (28 Jun 2019 20:28)  POCT Blood Glucose.: 115 mg/dL (28 Jun 2019 16:40)      PHYSICAL EXAM:  GENERAL: NAD, awake, confused, patient is laying comfortably in bed  HEENT: AT, NC, SUPPLE, NO JVD, NO CB  EYES:  pupils with reaction to direct light stimulation b/l  LUNG: good breath sounds B/L  CVS: normal S1, S2, RRR, NO M/G/R  ABDOMEN: soft, bowel sounds present, normoactive in all 4 quadrants, non-tender, non-distended  EXT: no E/C/C, positive PP b/l extremities  NEURO: patient is awake, confused, not following  verbal commands  SKIN: no rash        LAB  CBC  Date: 06-29-19 @ 07:40  Mean cell Znwiuhzceh31.4  Mean cell Hemoglobin Conc30.7  Mean cell Volum 95.7  Platelet count-Automate 118  RBC Count 3.50  Red Cell Distrib Width14.6  WBC Count3.91  % Albumin, Urine--  Hematocrit 33.5  Hemoglobin 10.3  CBC  Date: 06-28-19 @ 05:58  Mean cell Utfklobwia23.4  Mean cell Hemoglobin Conc30.2  Mean cell Volum 97.5  Platelet count-Automate 115  RBC Count 3.67  Red Cell Distrib Width14.8  WBC Count5.21  % Albumin, Urine--  Hematocrit 35.8  Hemoglobin 10.8  CBC  Date: 06-27-19 @ 08:13  Mean cell Aeuxzqqkzp85.8  Mean cell Hemoglobin Conc30.0  Mean cell Volum 99.2  Platelet count-Automate 129  RBC Count 3.86  Red Cell Distrib Width15.3  WBC Count4.96  % Albumin, Urine--  Hematocrit 38.3  Hemoglobin 11.5  CBC  Date: 06-26-19 @ 06:14  Mean cell Mgayzdyvoc73.9  Mean cell Hemoglobin Conc31.1  Mean cell Volum 96.1  Platelet count-Automate 133  RBC Count 3.61  Red Cell Distrib Width15.0  WBC Count4.55  % Albumin, Urine--  Hematocrit 34.7  Hemoglobin 10.8  CBC  Date: 06-25-19 @ 10:00  Mean cell Jphdgmcxtp68.9  Mean cell Hemoglobin Conc31.3  Mean cell Volum 95.4  Platelet count-Automate 195  RBC Count 4.38  Red Cell Distrib Width15.0  WBC Count7.65  % Albumin, Urine--  Hematocrit 41.8  Hemoglobin 13.1    [facility]  06-29-19 @ 07:40  Blood Gas Arterial-Calcium,Ionized--  Blood Urea Nitrogen, Serum 12 mg/dL [10 - 20]  Carbon Dioxide, Serum25 mmol/L [17 - 32]  Chloride, Iozdl735 mmol/L<H> [98 - 110]  Creatinie, Serum0.5 mg/dL<L> [0.7 - 1.5]  Glucose, Serum98 mg/dL [70 - 99]  Potassium, Serum4.3 mmol/L [3.5 - 5.0]  Sodium, Serum 152 mmol/L<H> [135 - 146]  [facility]  06-28-19 @ 05:58  Blood Gas Arterial-Calcium,Ionized--  Blood Urea Nitrogen, Serum 14 mg/dL [10 - 20]  Carbon Dioxide, Serum26 mmol/L [17 - 32]  Chloride, Olkha283 mmol/L<H> [98 - 110]  Creatinie, Serum0.5 mg/dL<L> [0.7 - 1.5]  Glucose, Tkbkc495 mg/dL<H> [70 - 99]  Potassium, Serum4.4 mmol/L [3.5 - 5.0]  Sodium, Serum 147 mmol/L<H> [135 - 146]  [facility]  06-27-19 @ 08:49  Blood Gas Arterial-Calcium,Ionized1.25 mmoL/L [1.12 - 1.30] [given to Dr. Fox at 6007. 3 lpm cannula]  Blood Urea Nitrogen, Serum --  Carbon Dioxide, Serum--  Chloride, Serum--  Creatinie, Serum--  Glucose, Serum--  Potassium, Serum--  Sodium, Serum --  BMP  06-27-19 @ 08:13  Blood Gas Arterial-Calcium,Ionized--  Blood Urea Nitrogen, Serum 18 mg/dL [10 - 20]  Carbon Dioxide, Serum26 mmol/L [17 - 32]  Chloride, Jfmlh458 mmol/L<H> [98 - 110]  Creatinie, Serum0.7 mg/dL [0.7 - 1.5]  Glucose, Serum89 mg/dL [70 - 99]  Potassium, Serum4.8 mmol/L [3.5 - 5.0]  Sodium, Serum 146 mmol/L [135 - 146]  BMP  06-26-19 @ 06:14  Blood Gas Arterial-Calcium,Ionized--  Blood Urea Nitrogen, Serum 33 mg/dL<H> [10 - 20]  Carbon Dioxide, Serum25 mmol/L [17 - 32]  Chloride, Retod429 mmol/L [98 - 110]  Creatinie, Serum1.0 mg/dL [0.7 - 1.5]  Glucose, Serum84 mg/dL [70 - 99]  Potassium, Serum4.5 mmol/L [3.5 - 5.0]  Sodium, Serum 142 mmol/L [135 - 146]  BMP  06-25-19 @ 10:00  Blood Gas Arterial-Calcium,Ionized--  Blood Urea Nitrogen, Serum 52 mg/dL<H> [10 - 20]  Carbon Dioxide, Serum23 mmol/L [17 - 32]  Chloride, Vcfsm904 mmol/L [98 - 110]  Creatinie, Serum1.9 mg/dL<H> [0.7 - 1.5]  Glucose, Serum97 mg/dL [70 - 99]  Potassium, Serum5.0 mmol/L [3.5 - 5.0] [Slighty Hemolyzed use with Caution]  Sodium, Serum 139 mmol/L [135 - 146]              Microbiology:    Culture - Urine (collected 06-25-19 @ 11:23)  Source: .Urine Catheterized  Final Report (06-26-19 @ 18:11):    No growth    Culture - Blood (collected 06-25-19 @ 10:00)  Source: .Blood Blood-Peripheral  Preliminary Report (06-26-19 @ 17:01):    No growth to date.    Culture - Blood (collected 06-25-19 @ 10:00)  Source: .Blood Blood-Peripheral  Preliminary Report (06-26-19 @ 17:01):    No growth to date.    Culture - Urine (collected 06-06-19 @ 16:45)  Source: .Urine Clean Catch (Midstream)  Final Report (06-07-19 @ 22:56):    <10,000 CFU/mL Normal Urogenital Meghana    Culture - Blood (collected 06-06-19 @ 14:54)  Source: .Blood Blood-Peripheral  Final Report (06-12-19 @ 02:01):    No growth at 5 days.    Culture - Blood (collected 06-06-19 @ 14:54)  Source: .Blood Blood-Peripheral  Final Report (06-12-19 @ 02:01):    No growth at 5 days.          Medications:  acetaminophen   Tablet .. 650 milliGRAM(s) Oral every 6 hours PRN  ALBUTerol/ipratropium for Nebulization 3 milliLiter(s) Nebulizer every 6 hours  atorvastatin 10 milliGRAM(s) Oral at bedtime  chlorhexidine 4% Liquid 1 Application(s) Topical <User Schedule>  dextrose 5%. 1000 milliLiter(s) IV Continuous <Continuous>  dextrose 50% Injectable 50 milliLiter(s) IV Push once PRN  heparin  Injectable 5000 Unit(s) SubCutaneous every 12 hours  levoFLOXacin IVPB 250 milliGRAM(s) IV Intermittent every 48 hours        Assessment and Plan:  - Lethargic state- most likely as a result of hypercapnic respiratory failure  - recent blood cxs- no bacterial growth,   - repeated  blood and urine cxs- no bacterial growth  -no leukocytosis on admission.  -CT head was completed on admission , showed old CVA, no acute changes, patient was evaluated by  Neurology specialist ,  - s/p  EEG- no seizure like activity  -obtain MRI of Brain to r/o new CVA  - continue IVF- D5NS@100ml/hr  -bsfs monitoring  -neuro assessments every shift.    - Hypernatremia- with dehydration state, although pt. was on continuous IVF  -will change IVF to Dextrose 5 in water @ 100 ml/hr  -next BMP today at 8pm, and in am    -borderline low bsfs- 70-80  range- continue dextrose infusion tx. with close bsfs monitoring    -Acute hypercapneic respiratory failure- acute Respiratory acidosis- pt was . on BIPAP at night, removed in am  -will check ABG now and reassess if pt's needs to be placed back on BIPAP  -monitor pulse ox level  -duonebs tx.  - repeated CXR - pleural effusions    -Acute Urinary retention- seen on renal/bladder US- inserted vázquez, patient has good urine output,  - monitor I and O strictly    -Recently tx. pneumonia- pt. completed tx., CXR- persistent left basilar opacity- no new pneumonia   - underlying neoplasm can not be r/o, patient will need outpatient CT chest to be repeated in 1.5 months to assess left lung opacitiy.    -KATINA- most likely due to urinary retention, resolved  continue IVF    -h/o HTN continue to hold all b/p meds meanwhile pt. NPO    diet: keep pt. NPO due to patient's high risk of aspiration, if she will not need BIPAP today, consider inserting NGT for feedings    daily GI and DVT proph.       #Progress Note Handoff: Pending Consults___none ______,Tests___MRI of brain,_____,Test Results__ABG today, BMP at 8pm to review Sodium level _____, other: monitor neuro status  Family discussion: no family available during my eval. Disposition: to be determined

## 2019-06-29 NOTE — PROGRESS NOTE ADULT - SUBJECTIVE AND OBJECTIVE BOX
HPI:   85 y/o F w/ PMH of DLD, HTN, CHF, and recent hospitalization of PNA presented from home w/ fever and AMS. Patient is non-verbal.    INTERVAL HPI:  Patient was examined at bedside. Patient was same as yesterday. Was sleeping with BIPAP mask, but was arousable and opened her eyes. Still non-verbal, but responded to directions and nodded. Attempted to vocalize something but wasn't understandable. Moved both upper extremities, and lower extremities a little.     Vital Signs Last 24 Hrs  T(C): 36.2 (29 Jun 2019 12:30), Max: 36.3 (28 Jun 2019 20:18)  T(F): 97.1 (29 Jun 2019 12:30), Max: 97.4 (28 Jun 2019 20:18)  HR: 77 (29 Jun 2019 12:30) (70 - 77)  BP: 151/69 (29 Jun 2019 12:30) (132/63 - 152/73)  BP(mean): --  RR: 18 (29 Jun 2019 12:30) (18 - 18)  SpO2: 100% (29 Jun 2019 05:26) (100% - 100%)    MEDICATIONS  (STANDING):  ALBUTerol/ipratropium for Nebulization 3 milliLiter(s) Nebulizer every 6 hours  atorvastatin 10 milliGRAM(s) Oral at bedtime  chlorhexidine 4% Liquid 1 Application(s) Topical <User Schedule>  dextrose 5%. 1000 milliLiter(s) (100 mL/Hr) IV Continuous <Continuous>  heparin  Injectable 5000 Unit(s) SubCutaneous every 12 hours  levoFLOXacin IVPB 250 milliGRAM(s) IV Intermittent every 48 hours  sodium chloride 0.9% Bolus 1000 milliLiter(s) IV Bolus once    MEDICATIONS  (PRN):  acetaminophen   Tablet .. 650 milliGRAM(s) Oral every 6 hours PRN Temp greater or equal to 38C (100.4F)  dextrose 50% Injectable 50 milliLiter(s) IV Push once PRN If Blood glucose is LESS than 70    PHYSICAL EXAM  General: Patient is laying comfortably in bed, arousable with eyes open NAD  Lungs: CTA B/L, no wheezing  Cardio: S1/S2, RRR, no murmurs   HEENT: Normocephalic, atraumatic  GI: NT/ND, soft, no guarding  Neuro: Patient is arousable, responded to verbal commands. HPI:   85 y/o F w/ PMH of DLD, HTN, CHF, and recent hospitalization of PNA presented from home w/ fever and AMS. Patient is non-verbal.    INTERVAL HPI:  Patient was examined at bedside. Patient was same as yesterday. Was sleeping with BIPAP mask, but was arousable and opened her eyes. Still non-verbal, but responded to directions and nodded. Attempted to vocalize something but wasn't understandable. Moved both upper extremities, and lower extremities a little.     Vital Signs Last 24 Hrs  T(C): 36.2 (29 Jun 2019 12:30), Max: 36.3 (28 Jun 2019 20:18)  T(F): 97.1 (29 Jun 2019 12:30), Max: 97.4 (28 Jun 2019 20:18)  HR: 77 (29 Jun 2019 12:30) (70 - 77)  BP: 151/69 (29 Jun 2019 12:30) (132/63 - 152/73)  BP(mean): --  RR: 18 (29 Jun 2019 12:30) (18 - 18)  SpO2: 100% (29 Jun 2019 05:26) (100% - 100%)    MEDICATIONS  (STANDING):  ALBUTerol/ipratropium for Nebulization 3 milliLiter(s) Nebulizer every 6 hours  atorvastatin 10 milliGRAM(s) Oral at bedtime  chlorhexidine 4% Liquid 1 Application(s) Topical <User Schedule>  dextrose 5%. 1000 milliLiter(s) (100 mL/Hr) IV Continuous <Continuous>  heparin  Injectable 5000 Unit(s) SubCutaneous every 12 hours  levoFLOXacin IVPB 250 milliGRAM(s) IV Intermittent every 48 hours  sodium chloride 0.9% Bolus 1000 milliLiter(s) IV Bolus once    MEDICATIONS  (PRN):  acetaminophen   Tablet .. 650 milliGRAM(s) Oral every 6 hours PRN Temp greater or equal to 38C (100.4F)  dextrose 50% Injectable 50 milliLiter(s) IV Push once PRN If Blood glucose is LESS than 70    PHYSICAL EXAM  General: Patient is laying comfortably in bed, arousable with eyes open on BIPAP. NAD  Lungs: CTA B/L, no wheezing  Cardio: S1/S2, RRR, no murmurs   HEENT: Normocephalic, atraumatic  GI: NT/ND, soft, no guarding  Neuro: Patient is arousable, responded to verbal commands    LABS:                        10.3   3.91  )-----------( 118      ( 29 Jun 2019 07:40 )             33.5     06-29    152<H>  |  119<H>  |  12  ----------------------------<  98  4.3   |  25  |  0.5<L>    Ca    7.5<L>      29 Jun 2019 07:40    TPro  4.3<L>  /  Alb  2.3<L>  /  TBili  0.3  /  DBili  x   /  AST  13  /  ALT  17  /  AlkPhos  50  06-29    ABG TREND:  ABG - ( 29 Jun 2019 14:56 )  pH: 7.33<L> /  pCO2: 53<H> /  pO2: 62<L> / HCO3: 28<H> / Base Excess: 1.0   /  SaO2: 93<L> / Lactate: x        ABG - ( 28 Jun 2019 13:06 )  pH: 7.43<H> /  pCO2: 38    /  pO2: 146<H> / HCO3: 26    / Base Excess: 1.5   /  SaO2: 99<H> / Lactate: x        ABG - ( 28 Jun 2019 08:58 )  pH: 7.32<L> /  pCO2: 54<H> /  pO2: 96<H> / HCO3: 28<H> / Base Excess: 0.9   /  SaO2: 98    / Lactate: x        ABG - ( 27 Jun 2019 17:20 )  pH: 7.30<L> /  pCO2: 57<H> /  pO2: 101<H> / HCO3: 28<H> / Base Excess: 0.6   /  SaO2: 98    / Lactate: x        ABG - ( 27 Jun 2019 14:17 )  pH: 7.25<L> /  pCO2: 65<HH> /  pO2: 149<H> / HCO3: 28<H> / Base Excess: x     /  SaO2: 99<H> / Lactate: x        POCT GLUCOSE:  POCT Blood Glucose.: 90 mg/dL [70 - 99] (06-29-19)  POCT Blood Glucose.: 84 mg/dL [70 - 99] (06-29-19)  POCT Blood Glucose.: 97 mg/dL [70 - 99] (06-29-19)  POCT Blood Glucose.: 105 mg/dL <H> [70 - 99] (06-28-19)  POCT Blood Glucose.: 115 mg/dL <H> [70 - 99] (06-28-19)  POCT Blood Glucose.: 112 mg/dL <H> [70 - 99] (06-28-19)  POCT Blood Glucose.: 90 mg/dL [70 - 99] (06-28-19)  POCT Blood Glucose.: 93 mg/dL [70 - 99] (06-28-19)  POCT Blood Glucose.: 97 mg/dL [70 - 99] (06-27-19)  POCT Blood Glucose.: 92 mg/dL [70 - 99] (06-27-19)  POCT Blood Glucose.: 94 mg/dL [70 - 99] (06-27-19)    Ammonia, Serum (06.27.19 @ 12:12)    Ammonia, Serum: 59 umol/L    FOLATE:  Folate, Serum (06.27.19 @ 12:12)    Folate, Serum: 9.2: Mild hemolysis. Results may be falsely elevated. ng/mL    TSH  Thyroid Stimulating Hormone, Serum: 1.48 uIU/mL (06.27.19 @ 12:12)    B12  Vitamin B12, Serum: 414 pg/mL (06.27.19 @ 12:12)    MICROBIOLOGY:  Culture - Urine (06.25.19 @ 11:23)    Specimen Source: .Urine Catheterized    Culture Results:   No growth    Culture - Blood (06.25.19 @ 10:00)    Specimen Source: .Blood Blood-Peripheral    Culture Results:   No growth to date.      RADIOLOGY:    < from: Xray Chest 1 View- PORTABLE-Routine (06.28.19 @ 15:33) >  Impression:      Basilar effusions.    Follow-up as needed.    < end of copied text >    < from: Xray Chest 1 View- PORTABLE-Urgent (06.25.19 @ 10:39) >  Impression:      Left base opacity. Follow-up to resolution is recommended.    < from: CT Abdomen and Pelvis No Cont (06.25.19 @ 13:32) >  IMPRESSION:     1. Since September 13, 2017, no definite evidence of acute/inflammatory   process within the abdomen or pelvis.    2. Status post cholecystectomy.    < from: CT Head No Cont (06.25.19 @ 13:22) >  Impression:      No mass effect or intracranial hemorrhage.   POCT Glucose Trend  92 mg/dL06-27 @ 16:40  94 mg/dL06-27 @ 11:18  157 mg/dL06-27 @ 09:41  87 mg/dL06-27 @ 07:38    Chronic left basal ganglia lacunar infarction.    < from: US Kidney and Bladder (06.26.19 @ 10:58) >  IMPRESSION:    No hydronephrosis. Bilateral renal cysts as above.    Distended urinary bladder with estimated volume of 564 cc. The patient   was unable to void for this exam.    EEG:  < from: EEG Awake or Drowsy (06.27.19 @ 14:10) >  IMPRESSIONS  This is an abnormal routine EEG due to the presence of focal and   generalized slowing as described above

## 2019-06-29 NOTE — PROGRESS NOTE ADULT - ASSESSMENT
Assessment:  87 y/o F w/ PMH of DLD, HTN, CHF, and recent hospitalization of PNA presented from home w/ fever and AMS. Patient is non-verbal.    #Hypercapnic Encephalopathy- improved  - First blood gas revealed pH of 7.24, pCO2 of 68. Trending up toward pH of 7.43 and PCO2: 38. Failed to wean off BIPAP as pH returned to 7.33  - Repeating ABG in AM  - Metabolic encephalopathy less likely d/t - BCX/UCX negative- No leukocytosis  - Ammonia elevated (59)  - EEG showed no seizure activity  - Folate/B12/TSH all WNL  - BiPAP  - F/U Brain MR  - Monitor pulse ox  - CXR shows basilar effusions  - Pulmonary consult recommended weaning off BIPAP, albuterol nebulizers, IV steroids, outpatient PFT's, and GOC discussion    #Hypoglycemia?  - NPO as per speech pathology recommendations  - IVF D5NS@100ml/hr  - POCT reveals low/lower limit of normal blood glucose levels, even with D50 infusion  - Underlying neoplasm (i.e. insulinoma)?  - Infuse dextrose if <80   - F/U Cortisol Assessment:  85 y/o F w/ PMH of DLD, HTN, CHF, and recent hospitalization of PNA presented from home w/ fever and AMS. Patient is non-verbal.    #Hypercapnic Encephalopathy- improved  - First blood gas revealed pH of 7.24, pCO2 of 68. Trending up toward pH of 7.43 and PCO2: 38. Failed to wean off BIPAP as pH returned to 7.33 and CO2 of 53, returning to BIPAP  - Continue to monitor ABG  - Metabolic encephalopathy less likely d/t - BCX/UCX negative- No leukocytosis  - Ammonia elevated (59)  - EEG showed no seizure activity  - Folate/B12/TSH all WNL  - BiPAP  - F/U Brain MR  - Monitor pulse ox  - CXR shows basilar effusions  - Pulmonary consult recommended weaning off BIPAP, albuterol nebulizers, IV steroids, outpatient PFT's, and GOC discussion    #Hypernatremia  - patient was on continuous IVF, now changed to D5 in water 100ml/hr  - Follow BMP at 8PM    #Hypoglycemia?  - NPO as per speech pathology recommendations  - IVF D5NS@100ml/hr changed to D5 in water due to hypernatremia  - POCT reveals low/lower limit of normal blood glucose levels, even with D50 infusion  - Underlying neoplasm (i.e. insulinoma)?  - Infuse dextrose if <80   - Cortisol AM, Serum: 11.8 ug/dL (06.27.19 @ 21:16) WNL    #Acute urinary retention  - seen on renal/bladder US.  - Funez inserted  - monitor I&O    #KATINA  - Urinary retention related, resolved    Diet:  NPO due to high risk of aspiration    GI + DVT PPX daily

## 2019-06-30 LAB
ANION GAP SERPL CALC-SCNC: 8 MMOL/L — SIGNIFICANT CHANGE UP (ref 7–14)
BUN SERPL-MCNC: 10 MG/DL — SIGNIFICANT CHANGE UP (ref 10–20)
CALCIUM SERPL-MCNC: 7.2 MG/DL — LOW (ref 8.5–10.1)
CHLORIDE SERPL-SCNC: 113 MMOL/L — HIGH (ref 98–110)
CO2 SERPL-SCNC: 24 MMOL/L — SIGNIFICANT CHANGE UP (ref 17–32)
CREAT SERPL-MCNC: 0.5 MG/DL — LOW (ref 0.7–1.5)
CULTURE RESULTS: SIGNIFICANT CHANGE UP
CULTURE RESULTS: SIGNIFICANT CHANGE UP
GLUCOSE BLDC GLUCOMTR-MCNC: 123 MG/DL — HIGH (ref 70–99)
GLUCOSE BLDC GLUCOMTR-MCNC: 77 MG/DL — SIGNIFICANT CHANGE UP (ref 70–99)
GLUCOSE BLDC GLUCOMTR-MCNC: 83 MG/DL — SIGNIFICANT CHANGE UP (ref 70–99)
GLUCOSE BLDC GLUCOMTR-MCNC: 90 MG/DL — SIGNIFICANT CHANGE UP (ref 70–99)
GLUCOSE BLDC GLUCOMTR-MCNC: 95 MG/DL — SIGNIFICANT CHANGE UP (ref 70–99)
GLUCOSE SERPL-MCNC: 80 MG/DL — SIGNIFICANT CHANGE UP (ref 70–99)
POTASSIUM SERPL-MCNC: 3.8 MMOL/L — SIGNIFICANT CHANGE UP (ref 3.5–5)
POTASSIUM SERPL-SCNC: 3.8 MMOL/L — SIGNIFICANT CHANGE UP (ref 3.5–5)
SODIUM SERPL-SCNC: 145 MMOL/L — SIGNIFICANT CHANGE UP (ref 135–146)
SPECIMEN SOURCE: SIGNIFICANT CHANGE UP
SPECIMEN SOURCE: SIGNIFICANT CHANGE UP

## 2019-06-30 PROCEDURE — 99233 SBSQ HOSP IP/OBS HIGH 50: CPT

## 2019-06-30 RX ORDER — DEXTROSE 50 % IN WATER 50 %
25 SYRINGE (ML) INTRAVENOUS ONCE
Refills: 0 | Status: COMPLETED | OUTPATIENT
Start: 2019-06-30 | End: 2019-06-30

## 2019-06-30 RX ADMIN — SODIUM CHLORIDE 100 MILLILITER(S): 9 INJECTION, SOLUTION INTRAVENOUS at 00:42

## 2019-06-30 RX ADMIN — Medication 25 MILLILITER(S): at 08:39

## 2019-06-30 RX ADMIN — SODIUM CHLORIDE 100 MILLILITER(S): 9 INJECTION, SOLUTION INTRAVENOUS at 09:52

## 2019-06-30 RX ADMIN — SODIUM CHLORIDE 100 MILLILITER(S): 9 INJECTION, SOLUTION INTRAVENOUS at 22:54

## 2019-06-30 RX ADMIN — Medication 3 MILLILITER(S): at 14:58

## 2019-06-30 RX ADMIN — Medication 3 MILLILITER(S): at 08:49

## 2019-06-30 RX ADMIN — HEPARIN SODIUM 5000 UNIT(S): 5000 INJECTION INTRAVENOUS; SUBCUTANEOUS at 17:05

## 2019-06-30 RX ADMIN — CHLORHEXIDINE GLUCONATE 1 APPLICATION(S): 213 SOLUTION TOPICAL at 05:16

## 2019-06-30 RX ADMIN — HEPARIN SODIUM 5000 UNIT(S): 5000 INJECTION INTRAVENOUS; SUBCUTANEOUS at 05:16

## 2019-06-30 NOTE — PROGRESS NOTE ADULT - SUBJECTIVE AND OBJECTIVE BOX
MARLENA KYRA  [facility] T2-3A 019 B ([facility] T2[facility])            Patient was evaluated and examined  by bedside, remains confused, awake, constantly wants to remove BIPAP mask from her face, no dyspnea at rest      REVIEW OF SYSTEMS:  unable to obtain due to patient's confusion state      T(C): , Max: 36.2 (06-29-19 @ 12:30)  HR: 73 (06-30-19 @ 04:46)  BP: 135/65 (06-30-19 @ 04:46)  RR: 18 (06-30-19 @ 04:46)  SpO2: 97% (06-29-19 @ 19:51)  CAPILLARY BLOOD GLUCOSE      POCT Blood Glucose.: 123 mg/dL (30 Jun 2019 08:58)  POCT Blood Glucose.: 77 mg/dL (30 Jun 2019 07:50)  POCT Blood Glucose.: 88 mg/dL (29 Jun 2019 20:17)  POCT Blood Glucose.: 90 mg/dL (29 Jun 2019 16:43)  POCT Blood Glucose.: 84 mg/dL (29 Jun 2019 11:26)      PHYSICAL EXAM:  GENERAL: NAD, awake, confused, patient is laying comfortably in bed  HEENT: AT, NC, SUPPLE, NO JVD, NO CB  EYES:  pupils with reaction to direct light stimulation b/l  LUNG: good breath sounds B/L  CVS: normal S1, S2, RRR, NO M/G/R  ABDOMEN: soft, bowel sounds present, normoactive in all 4 quadrants, non-tender, non-distended  EXT: no E/C/C, positive PP b/l extremities  NEURO: patient is awake, confused, not following  verbal commands  SKIN: no rash          LAB  CBC  Date: 06-29-19 @ 07:40  Mean cell Zfhwpmtzmf43.4  Mean cell Hemoglobin Conc30.7  Mean cell Volum 95.7  Platelet count-Automate 118  RBC Count 3.50  Red Cell Distrib Width14.6  WBC Count3.91  % Albumin, Urine--  Hematocrit 33.5  Hemoglobin 10.3  CBC  Date: 06-28-19 @ 05:58  Mean cell Amwhcyvddx25.4  Mean cell Hemoglobin Conc30.2  Mean cell Volum 97.5  Platelet count-Automate 115  RBC Count 3.67  Red Cell Distrib Width14.8  WBC Count5.21  % Albumin, Urine--  Hematocrit 35.8  Hemoglobin 10.8  CBC  Date: 06-27-19 @ 08:13  Mean cell Kucfhifqno90.8  Mean cell Hemoglobin Conc30.0  Mean cell Volum 99.2  Platelet count-Automate 129  RBC Count 3.86  Red Cell Distrib Width15.3  WBC Count4.96  % Albumin, Urine--  Hematocrit 38.3  Hemoglobin 11.5  CBC  Date: 06-26-19 @ 06:14  Mean cell Ccoffctawm48.9  Mean cell Hemoglobin Conc31.1  Mean cell Volum 96.1  Platelet count-Automate 133  RBC Count 3.61  Red Cell Distrib Width15.0  WBC Count4.55  % Albumin, Urine--  Hematocrit 34.7  Hemoglobin 10.8  CBC  Date: 06-25-19 @ 10:00  Mean cell Nzbhwtnvwo20.9  Mean cell Hemoglobin Conc31.3  Mean cell Volum 95.4  Platelet count-Automate 195  RBC Count 4.38  Red Cell Distrib Width15.0  WBC Count7.65  % Albumin, Urine--  Hematocrit 41.8  Hemoglobin 13.1    [facility]  06-30-19 @ 06:20  Blood Gas Arterial-Calcium,Ionized--  Blood Urea Nitrogen, Serum 10 mg/dL [10 - 20]  Carbon Dioxide, Serum24 mmol/L [17 - 32]  Chloride, Ywzwe377 mmol/L<H> [98 - 110]  Creatinie, Serum0.5 mg/dL<L> [0.7 - 1.5]  Glucose, Serum80 mg/dL [70 - 99]  Potassium, Serum3.8 mmol/L [3.5 - 5.0]  Sodium, Serum 145 mmol/L [135 - 146]  [facility]  06-29-19 @ 22:09  Blood Gas Arterial-Calcium,Ionized--  Blood Urea Nitrogen, Serum 11 mg/dL [10 - 20]  Carbon Dioxide, Serum24 mmol/L [17 - 32]  Chloride, [id number] mmol/L<H> [98 - 110]  Creatinie, Serum0.5 mg/dL<L> [0.7 - 1.5]  Glucose, Serum90 mg/dL [70 - 99]  Potassium, Serum3.9 mmol/L [3.5 - 5.0]  Sodium, Serum 149 mmol/L<H> [135 - 146]  [facility]  06-29-19 @ 14:56  Blood Gas Arterial-Calcium,Ionized1.15 mmoL/L [1.12 - 1.30]  Blood Urea Nitrogen, Serum --  Carbon Dioxide, Serum--  Chloride, Serum--  Creatinie, Serum--  Glucose, Serum--  Potassium, Serum--  Sodium, Serum --  [facility]  06-29-19 @ 07:40  Blood Gas Arterial-Calcium,Ionized--  Blood Urea Nitrogen, Serum 12 mg/dL [10 - 20]  Carbon Dioxide, Serum25 mmol/L [17 - 32]  Chloride, Djndr864 mmol/L<H> [98 - 110]  Creatinie, Serum0.5 mg/dL<L> [0.7 - 1.5]  Glucose, Serum98 mg/dL [70 - 99]  Potassium, Serum4.3 mmol/L [3.5 - 5.0]  Sodium, Serum 152 mmol/L<H> [135 - 146]  [facility]  06-28-19 @ 05:58  Blood Gas Arterial-Calcium,Ionized--  Blood Urea Nitrogen, Serum 14 mg/dL [10 - 20]  Carbon Dioxide, Serum26 mmol/L [17 - 32]  Chloride, Lidgq282 mmol/L<H> [98 - 110]  Creatinie, Serum0.5 mg/dL<L> [0.7 - 1.5]  Glucose, Ynzgs397 mg/dL<H> [70 - 99]  Potassium, Serum4.4 mmol/L [3.5 - 5.0]  Sodium, Serum 147 mmol/L<H> [135 - 146]  [facility]  06-27-19 @ 08:49  Blood Gas Arterial-Calcium,Ionized1.25 mmoL/L [1.12 - 1.30] [given to Dr. Fox at 6007. 3 lpm cannula]  Blood Urea Nitrogen, Serum --  Carbon Dioxide, Serum--  Chloride, Serum--  Creatinie, Serum--  Glucose, Serum--  Potassium, Serum--  Sodium, Serum --  [facility]  06-27-19 @ 08:13  Blood Gas Arterial-Calcium,Ionized--  Blood Urea Nitrogen, Serum 18 mg/dL [10 - 20]  Carbon Dioxide, Serum26 mmol/L [17 - 32]  Chloride, Zrrqq983 mmol/L<H> [98 - 110]  Creatinie, Serum0.7 mg/dL [0.7 - 1.5]  Glucose, Serum89 mg/dL [70 - 99]  Potassium, Serum4.8 mmol/L [3.5 - 5.0]  Sodium, Serum 146 mmol/L [135 - 146]  [facility]  06-26-19 @ 06:14  Blood Gas Arterial-Calcium,Ionized--  Blood Urea Nitrogen, Serum 33 mg/dL<H> [10 - 20]  Carbon Dioxide, Serum25 mmol/L [17 - 32]  Chloride, Yubtu740 mmol/L [98 - 110]  Creatinie, Serum1.0 mg/dL [0.7 - 1.5]  Glucose, Serum84 mg/dL [70 - 99]  Potassium, Serum4.5 mmol/L [3.5 - 5.0]  Sodium, Serum 142 mmol/L [135 - 146]              Microbiology:    Culture - Urine (collected 06-25-19 @ 11:23)  Source: .Urine Catheterized  Final Report (06-26-19 @ 18:11):    No growth    Culture - Blood (collected 06-25-19 @ 10:00)  Source: .Blood Blood-Peripheral  Preliminary Report (06-26-19 @ 17:01):    No growth to date.    Culture - Blood (collected 06-25-19 @ 10:00)  Source: .Blood Blood-Peripheral  Preliminary Report (06-26-19 @ 17:01):    No growth to date.    Culture - Urine (collected 06-06-19 @ 16:45)  Source: .Urine Clean Catch (Midstream)  Final Report (06-07-19 @ 22:56):    <10,000 CFU/mL Normal Urogenital Meghana    Culture - Blood (collected 06-06-19 @ 14:54)  Source: .Blood Blood-Peripheral  Final Report (06-12-19 @ 02:01):    No growth at 5 days.    Culture - Blood (collected 06-06-19 @ 14:54)  Source: .Blood Blood-Peripheral  Final Report (06-12-19 @ 02:01):    No growth at 5 days.        Medications:  acetaminophen   Tablet .. 650 milliGRAM(s) Oral every 6 hours PRN  ALBUTerol/ipratropium for Nebulization 3 milliLiter(s) Nebulizer every 6 hours  atorvastatin 10 milliGRAM(s) Oral at bedtime  chlorhexidine 4% Liquid 1 Application(s) Topical <User Schedule>  dextrose 5%. 1000 milliLiter(s) IV Continuous <Continuous>  dextrose 50% Injectable 50 milliLiter(s) IV Push once PRN  heparin  Injectable 5000 Unit(s) SubCutaneous every 12 hours  levoFLOXacin IVPB 250 milliGRAM(s) IV Intermittent every 48 hours        Assessment and Plan:  - Persistent confusion state- most likely as a result of hypercapnic respiratory failure  - recent blood cxs- no bacterial growth,   - repeated  blood and urine cxs- no bacterial growth  -no leukocytosis on admission.  -CT head was completed on admission , showed old CVA, no acute changes, patient was evaluated by  Neurology specialist ,  - s/p  EEG- no seizure like activity  -patient had completed  MRI of Brain on 6/29 to r/o new CVA -we still dont have official radiology report  - continue IVF- D5W@100ml/hr  -bsfs monitoring  -neuro assessments every shift.    - Hypernatremia- with dehydration state, corrected  -continue  IVF to Dextrose 5 in water @ 100 ml/hr  -next BMP in am    -borderline low bsfs- 70-80  range- continue dextrose infusion tx. with close bsfs monitoring    -Acute hypercapneic respiratory failure- acute Respiratory acidosis  - continue  on BIPAP tx  -monitor pulse ox level  -duonebs tx.  - repeated CXR - pleural effusions    -Acute Urinary retention- seen on renal/bladder US- inserted vázquez, patient has good urine output,  - monitor I and O strictly    -Recently tx. pneumonia- pt. completed tx., CXR- persistent left basilar opacity- no new pneumonia   - underlying neoplasm can not be r/o, patient will need outpatient CT chest to be repeated in 1.5 months to assess left lung opacitiy.    -KATINA- most likely due to urinary retention, resolved  continue IVF    -h/o HTN continue to hold all b/p meds meanwhile pt. NPO    diet: keep pt. NPO due to patient's high risk of aspiration, if she will not need BIPAP today, consider inserting NGT for feedings    daily GI and DVT proph.     overall prognosis: poor      #Progress Note Handoff: Pending Consults___none ______,Tests___MRI of brain results,_____,Test Results__ABG today _____, other: monitor neuro status  Family discussion: patient's daughter by bedside  Disposition: to be determined

## 2019-07-01 LAB
ANION GAP SERPL CALC-SCNC: 9 MMOL/L — SIGNIFICANT CHANGE UP (ref 7–14)
BASE EXCESS BLDA CALC-SCNC: 1.4 MMOL/L — SIGNIFICANT CHANGE UP (ref -2–2)
BUN SERPL-MCNC: 6 MG/DL — LOW (ref 10–20)
CALCIUM SERPL-MCNC: 7.3 MG/DL — LOW (ref 8.5–10.1)
CHLORIDE SERPL-SCNC: 111 MMOL/L — HIGH (ref 98–110)
CO2 SERPL-SCNC: 24 MMOL/L — SIGNIFICANT CHANGE UP (ref 17–32)
CREAT SERPL-MCNC: 0.5 MG/DL — LOW (ref 0.7–1.5)
GAS PNL BLDA: SIGNIFICANT CHANGE UP
GLUCOSE BLDC GLUCOMTR-MCNC: 111 MG/DL — HIGH (ref 70–99)
GLUCOSE BLDC GLUCOMTR-MCNC: 120 MG/DL — HIGH (ref 70–99)
GLUCOSE SERPL-MCNC: 96 MG/DL — SIGNIFICANT CHANGE UP (ref 70–99)
HCO3 BLDA-SCNC: 28 MMOL/L — HIGH (ref 23–27)
HOROWITZ INDEX BLDA+IHG-RTO: 36 — SIGNIFICANT CHANGE UP
PCO2 BLDA: 50 MMHG — HIGH (ref 38–42)
PH BLDA: 7.35 — LOW (ref 7.38–7.42)
PO2 BLDA: 123 MMHG — HIGH (ref 78–95)
POTASSIUM SERPL-MCNC: 3.6 MMOL/L — SIGNIFICANT CHANGE UP (ref 3.5–5)
POTASSIUM SERPL-SCNC: 3.6 MMOL/L — SIGNIFICANT CHANGE UP (ref 3.5–5)
SAO2 % BLDA: 99 % — HIGH (ref 94–98)
SODIUM SERPL-SCNC: 144 MMOL/L — SIGNIFICANT CHANGE UP (ref 135–146)

## 2019-07-01 PROCEDURE — 99233 SBSQ HOSP IP/OBS HIGH 50: CPT

## 2019-07-01 RX ADMIN — Medication 3 MILLILITER(S): at 19:28

## 2019-07-01 RX ADMIN — HEPARIN SODIUM 5000 UNIT(S): 5000 INJECTION INTRAVENOUS; SUBCUTANEOUS at 05:13

## 2019-07-01 RX ADMIN — SODIUM CHLORIDE 100 MILLILITER(S): 9 INJECTION, SOLUTION INTRAVENOUS at 19:52

## 2019-07-01 RX ADMIN — HEPARIN SODIUM 5000 UNIT(S): 5000 INJECTION INTRAVENOUS; SUBCUTANEOUS at 18:26

## 2019-07-01 RX ADMIN — CHLORHEXIDINE GLUCONATE 1 APPLICATION(S): 213 SOLUTION TOPICAL at 05:13

## 2019-07-01 RX ADMIN — Medication 3 MILLILITER(S): at 01:45

## 2019-07-01 RX ADMIN — Medication 3 MILLILITER(S): at 07:19

## 2019-07-01 RX ADMIN — Medication 3 MILLILITER(S): at 13:51

## 2019-07-01 NOTE — PROGRESS NOTE ADULT - ASSESSMENT
Assessment:  87 y/o F w/ PMH of DLD, HTN, CHF, and recent hospitalization of PNA presented from home w/ fever and AMS. Patient is non-verbal.    #Hypercapnic Encephalopathy- improves w/ BIPAP  - Improves when on BIPAP. Will attempt to wean to cannula and monitor ABG's today (7/1)  - Metabolic encephalopathy less likely d/t - BCX/UCX negative- No leukocytosis  - Ammonia elevated (59)  - EEG showed no seizure activity  - Folate/B12/TSH all WNL  - BiPAP  - MR brain revealed chronic microvascular changes and chronic lacunar infarctions  - Monitor pulse ox  - CXR shows basilar effusions  - Pulmonary consult recommended weaning off BIPAP, albuterol nebulizers, IV steroids, outpatient PFT's, and GOC discussion    #Hypernatremia - resolved   - patient was on continuous IVF, was changed to D5 in water 100ml/hr  - Sodium, Serum: 144 mmol/L (07.01.19 @ 05:32)    #Hypoglycemia?  - NPO as per speech pathology recommendations  - IVF D5NS@100ml/hr changed to D5 in water due to previous hypernatremia  - POCT reveals low/lower limit of normal blood glucose levels, even with D50 infusion  - Underlying neoplasm (i.e. insulinoma)?  - Infuse dextrose if <80   - Cortisol AM, Serum: 11.8 ug/dL (06.27.19 @ 21:16) WNL    #Acute urinary retention  - seen on renal/bladder US.  - Funez inserted  - monitor I&O    #KATINA  - Urinary retention related, resolved    Diet:  NPO due to high risk of aspiration

## 2019-07-01 NOTE — PROGRESS NOTE ADULT - SUBJECTIVE AND OBJECTIVE BOX
HPI:  86 y/f with PMH of DLD, HTN, recent hospitalization for pneumonia presents here from home with reported history of fever since yesterday. She is unable to provide any history, no family at bedside, couldnt reach Daughter Waleska Ocasio at this time inspite of trying multiple times, history based on ED chart. She is reported to have she was having fever x 1 day, she also seemed to be confused this AM, was given Bactrim which was left over from her prior infection but she threw up after taking it.     INTERVAL HPI/OVERNIGHT EVENTS:  Patient was seen and examined at bedside. She was on a BIPAP machine sleeping in bed. The patient was much more arousable, opened her eyes, and looked in my direction. She was able to follow commands, was able to give me a handshake with each hand when requested, and squeezed my hand. She was nodding yes to pain regardless of where I palpated. This included lower extremities, upper extremities, chest and abdomen to very light palpation - questionable understanding despite her responsiveness. Has a vázquez in.     ROS: Otherwise unremarkable    VITALS  T(C): 37.2 (07-01-19 @ 12:30), Max: 37.2 (07-01-19 @ 12:30)  HR: 80 (07-01-19 @ 12:30) (64 - 80)  BP: 162/71 (07-01-19 @ 12:30) (138/70 - 162/71)  RR: 18 (07-01-19 @ 12:30) (18 - 18)  SpO2: 95% (06-30-19 @ 20:56) (95% - 99%)  Wt(kg): --Vital Signs Last 24 Hrs  T(C): 37.2 (01 Jul 2019 12:30), Max: 37.2 (01 Jul 2019 12:30)  T(F): 98.9 (01 Jul 2019 12:30), Max: 98.9 (01 Jul 2019 12:30)  HR: 80 (01 Jul 2019 12:30) (64 - 80)  BP: 162/71 (01 Jul 2019 12:30) (138/70 - 162/71)  BP(mean): --  RR: 18 (01 Jul 2019 12:30) (18 - 18)  SpO2: 95% (30 Jun 2019 20:56) (95% - 99%)    MEDICATIONS (STANDING))  ALBUTerol/ipratropium for Nebulization 3 milliLiter(s) Nebulizer every 6 hours  atorvastatin 10 milliGRAM(s) Oral at bedtime  chlorhexidine 4% Liquid 1 Application(s) Topical <User Schedule>  dextrose 5%. 1000 milliLiter(s) IV Continuous <Continuous>  heparin  Injectable 5000 Unit(s) SubCutaneous every 12 hours      MEDICATIONS (PRN)  acetaminophen   Tablet .. 650 milliGRAM(s) Oral every 6 hours PRN Temp greater or equal to 38C (100.4F)  dextrose 50% Injectable 50 milliLiter(s) IV Push once PRN If Blood glucose is LESS than 70      ALLERGIES  No Known Allergies      PHYSICAL EXAM:  GENERAL: NAD, sleeping on BIPAP  HEAD:  Atraumatic, Normocephalic  NERVOUS SYSTEM: Alert, questonable if oriented. Non-verbal.   CHEST/LUNG: Clear to auscultaton B/L, loud BIPAP sounds.   HEART: S1/S2, no murmurs.   ABDOMEN: Soft, non-distended. Tenderness undetermined.    LABS:  07-01    144  |  111<H>  |  6<L>  ----------------------------<  96  3.6   |  24  |  0.5<L>    Ca    7.3<L>      01 Jul 2019 05:32    POCT GLUCOSE:  POCT Blood Glucose.: 111 mg/dL <H> [70 - 99] (07-01-19)  POCT Blood Glucose.: 120 mg/dL <H> [70 - 99] (07-01-19)  POCT Blood Glucose.: 95 mg/dL [70 - 99] (06-30-19)  POCT Blood Glucose.: 90 mg/dL [70 - 99] (06-30-19)  POCT Blood Glucose.: 83 mg/dL [70 - 99] (06-30-19)  POCT Blood Glucose.: 123 mg/dL <H> [70 - 99] (06-30-19)  POCT Blood Glucose.: 77 mg/dL [70 - 99] (06-30-19)    MICROBIOLOGY  Culture - Urine (06.25.19 @ 11:23)    Specimen Source: .Urine Catheterized    Culture Results:   No growth    Culture - Blood (06.25.19 @ 10:00)    Specimen Source: .Blood Blood-Peripheral    Culture Results:   No growth at 5 days.    RADIOLOGY & ADDITIONAL TESTS:  < from: CT Abdomen and Pelvis No Cont (06.25.19 @ 13:32) >  IMPRESSION:     1. Since September 13, 2017, no definite evidence of acute/inflammatory   process within the abdomen or pelvis.    2. Status post cholecystectomy.    < from: MR Head No Cont (06.29.19 @ 11:02) >  IMPRESSION:     Limited by motion artifact.    No evidence of acute intracranial pathology.    Chronic microvascular ischemic changes and chronic lacunar infarctions.

## 2019-07-01 NOTE — PROGRESS NOTE ADULT - SUBJECTIVE AND OBJECTIVE BOX
JUDYKYRA WESTBROOK  [facility] T2-3A 019 B ([facility] T2[facility]3A)            Patient was evaluated and examined  by bedside, awake, confused, was given 4 hours break from BIPAP tx, tried to feed her, she has swallowed 2 spoons of apple sauce and said she was not hungry anymore, no dyspnea at rest         REVIEW OF SYSTEMS:  unable to obtain, patient is confused       T(C): , Max: 37.2 (07-01-19 @ 12:30)  HR: 80 (07-01-19 @ 12:30)  BP: 162/71 (07-01-19 @ 12:30)  RR: 18 (07-01-19 @ 12:30)  SpO2: 95% (06-30-19 @ 20:56)  CAPILLARY BLOOD GLUCOSE      POCT Blood Glucose.: 111 mg/dL (01 Jul 2019 11:46)  POCT Blood Glucose.: 120 mg/dL (01 Jul 2019 07:41)  POCT Blood Glucose.: 95 mg/dL (30 Jun 2019 21:47)  POCT Blood Glucose.: 90 mg/dL (30 Jun 2019 16:55)      PHYSICAL EXAM:  GENERAL: NAD, awake, confused, patient is laying comfortably in bed  HEENT: AT, NC, SUPPLE, NO JVD, NO CB  EYES:  pupils with reaction to direct light stimulation b/l  LUNG: good breath sounds B/L  CVS: normal S1, S2, RRR, NO M/G/R  ABDOMEN: soft, bowel sounds present, normoactive in all 4 quadrants, non-tender, non-distended  EXT: no E/C/C, positive PP b/l extremities  NEURO: patient is awake, confused, not following  verbal commands  SKIN: no rash          LAB  CBC  Date: 06-29-19 @ 07:40  Mean cell Shdkewbfly01.4  Mean cell Hemoglobin Conc30.7  Mean cell Volum 95.7  Platelet count-Automate 118  RBC Count 3.50  Red Cell Distrib Width14.6  WBC Count3.91  % Albumin, Urine--  Hematocrit 33.5  Hemoglobin 10.3  CBC  Date: 06-28-19 @ 05:58  Mean cell Rbmlydznkk82.4  Mean cell Hemoglobin Conc30.2  Mean cell Volum 97.5  Platelet count-Automate 115  RBC Count 3.67  Red Cell Distrib Width14.8  WBC Count5.21  % Albumin, Urine--  Hematocrit 35.8  Hemoglobin 10.8  CBC  Date: 06-27-19 @ 08:13  Mean cell Vhbvevwhaj17.8  Mean cell Hemoglobin Conc30.0  Mean cell Volum 99.2  Platelet count-Automate 129  RBC Count 3.86  Red Cell Distrib Width15.3  WBC Count4.96  % Albumin, Urine--  Hematocrit 38.3  Hemoglobin 11.5  CBC  Date: 06-26-19 @ 06:14  Mean cell Ylueyfqsuk04.9  Mean cell Hemoglobin Conc31.1  Mean cell Volum 96.1  Platelet count-Automate 133  RBC Count 3.61  Red Cell Distrib Width15.0  WBC Count4.55  % Albumin, Urine--  Hematocrit 34.7  Hemoglobin 10.8  CBC  Date: 06-25-19 @ 10:00  Mean cell Ucqgkauxku74.9  Mean cell Hemoglobin Conc31.3  Mean cell Volum 95.4  Platelet count-Automate 195  RBC Count 4.38  Red Cell Distrib Width15.0  WBC Count7.65  % Albumin, Urine--  Hematocrit 41.8  Hemoglobin 13.1    [facility]  07-01-19 @ 05:32  Blood Gas Arterial-Calcium,Ionized--  Blood Urea Nitrogen, Serum 6 mg/dL<L> [10 - 20]  Carbon Dioxide, Serum24 mmol/L [17 - 32]  Chloride, Wedve503 mmol/L<H> [98 - 110]  Creatinie, Serum0.5 mg/dL<L> [0.7 - 1.5]  Glucose, Serum96 mg/dL [70 - 99]  Potassium, Serum3.6 mmol/L [3.5 - 5.0]  Sodium, Serum 144 mmol/L [135 - 146]  [facility]  06-30-19 @ 06:20  Blood Gas Arterial-Calcium,Ionized--  Blood Urea Nitrogen, Serum 10 mg/dL [10 - 20]  Carbon Dioxide, Serum24 mmol/L [17 - 32]  Chloride, Bopts707 mmol/L<H> [98 - 110]  Creatinie, Serum0.5 mg/dL<L> [0.7 - 1.5]  Glucose, Serum80 mg/dL [70 - 99]  Potassium, Serum3.8 mmol/L [3.5 - 5.0]  Sodium, Serum 145 mmol/L [135 - 146]  [facility]  06-29-19 @ 22:09  Blood Gas Arterial-Calcium,Ionized--  Blood Urea Nitrogen, Serum 11 mg/dL [10 - 20]  Carbon Dioxide, Serum24 mmol/L [17 - 32]  Chloride, Gzjze779 mmol/L<H> [98 - 110]  Creatinie, Serum0.5 mg/dL<L> [0.7 - 1.5]  Glucose, Serum90 mg/dL [70 - 99]  Potassium, Serum3.9 mmol/L [3.5 - 5.0]  Sodium, Serum 149 mmol/L<H> [135 - 146]  [facility]  06-29-19 @ 14:56  Blood Gas Arterial-Calcium,Ionized1.15 mmoL/L [1.12 - 1.30]  Blood Urea Nitrogen, Serum --  Carbon Dioxide, Serum--  Chloride, Serum--  Creatinie, Serum--  Glucose, Serum--  Potassium, Serum--  Sodium, Serum --  [facility]  06-29-19 @ 07:40  Blood Gas Arterial-Calcium,Ionized--  Blood Urea Nitrogen, Serum 12 mg/dL [10 - 20]  Carbon Dioxide, Serum25 mmol/L [17 - 32]  Chloride, Ryhpd295 mmol/L<H> [98 - 110]  Creatinie, Serum0.5 mg/dL<L> [0.7 - 1.5]  Glucose, Serum98 mg/dL [70 - 99]  Potassium, Serum4.3 mmol/L [3.5 - 5.0]  Sodium, Serum 152 mmol/L<H> [135 - 146]  [facility]  06-28-19 @ 05:58  Blood Gas Arterial-Calcium,Ionized--  Blood Urea Nitrogen, Serum 14 mg/dL [10 - 20]  Carbon Dioxide, Serum26 mmol/L [17 - 32]  Chloride, [id number] mmol/L<H> [98 - 110]  Creatinie, Serum0.5 mg/dL<L> [0.7 - 1.5]  Glucose, Cepon422 mg/dL<H> [70 - 99]  Potassium, Serum4.4 mmol/L [3.5 - 5.0]  Sodium, Serum 147 mmol/L<H> [135 - 146]  [facility]  06-27-19 @ 08:49  Blood Gas Arterial-Calcium,Ionized1.25 mmoL/L [1.12 - 1.30] [given to Dr. Fox at 6007. 3 lpm cannula]  Blood Urea Nitrogen, Serum --  Carbon Dioxide, Serum--  Chloride, Serum--  Creatinie, Serum--  Glucose, Serum--  Potassium, Serum--  Sodium, Serum --  [facility]  06-27-19 @ 08:13  Blood Gas Arterial-Calcium,Ionized--  Blood Urea Nitrogen, Serum 18 mg/dL [10 - 20]  Carbon Dioxide, Serum26 mmol/L [17 - 32]  Chloride, Bnphu735 mmol/L<H> [98 - 110]  Creatinie, Serum0.7 mg/dL [0.7 - 1.5]  Glucose, Serum89 mg/dL [70 - 99]  Potassium, Serum4.8 mmol/L [3.5 - 5.0]  Sodium, Serum 146 mmol/L [135 - 146]              Microbiology:    Culture - Urine (collected 06-25-19 @ 11:23)  Source: .Urine Catheterized  Final Report (06-26-19 @ 18:11):    No growth    Culture - Blood (collected 06-25-19 @ 10:00)  Source: .Blood Blood-Peripheral  Final Report (06-30-19 @ 17:01):    No growth at 5 days.    Culture - Blood (collected 06-25-19 @ 10:00)  Source: .Blood Blood-Peripheral  Final Report (06-30-19 @ 17:01):    No growth at 5 days.    Culture - Urine (collected 06-06-19 @ 16:45)  Source: .Urine Clean Catch (Midstream)  Final Report (06-07-19 @ 22:56):    <10,000 CFU/mL Normal Urogenital Meghana    Culture - Blood (collected 06-06-19 @ 14:54)  Source: .Blood Blood-Peripheral  Final Report (06-12-19 @ 02:01):    No growth at 5 days.    Culture - Blood (collected 06-06-19 @ 14:54)  Source: .Blood Blood-Peripheral  Final Report (06-12-19 @ 02:01):    No growth at 5 days.      Medications:  acetaminophen   Tablet .. 650 milliGRAM(s) Oral every 6 hours PRN  ALBUTerol/ipratropium for Nebulization 3 milliLiter(s) Nebulizer every 6 hours  atorvastatin 10 milliGRAM(s) Oral at bedtime  chlorhexidine 4% Liquid 1 Application(s) Topical <User Schedule>  dextrose 5%. 1000 milliLiter(s) IV Continuous <Continuous>  dextrose 50% Injectable 50 milliLiter(s) IV Push once PRN  heparin  Injectable 5000 Unit(s) SubCutaneous every 12 hours  levoFLOXacin IVPB 250 milliGRAM(s) IV Intermittent every 48 hours        Assessment and Plan:  - Persistent confusion state- most likely as a result of hypercapnic respiratory failure  - recent blood cxs- no bacterial growth,   - repeated  blood and urine cxs- no bacterial growth  -no leukocytosis on admission.  -CT head was completed on admission , showed old CVA, no acute changes, patient was evaluated by  Neurology specialist ,  - s/p  EEG- no seizure like activity  -patient had completed  MRI of Brain on 6/29 - no acute CVA  - continue IVF- D5W@100ml/hr  -bsfs monitoring  -neuro assessments every shift.    - Hypernatremia- with dehydration state, corrected  -continue  IVF to Dextrose 5 in water @ 100 ml/hr  -next BMP in am    -borderline low bsfs- 70-80  range- continue dextrose infusion tx. with close bsfs monitoring    -Acute hypercapneic respiratory failure- acute Respiratory acidosis  - continue  on BIPAP tx  -monitor pulse ox level  -duonebs tx.  - repeated CXR - pleural effusions    -Acute Urinary retention- seen on renal/bladder US- inserted vázquez, patient has good urine output,  - monitor I and O strictly    -Recently tx. pneumonia- pt. completed tx., CXR- persistent left basilar opacity- no new pneumonia   - underlying neoplasm can not be r/o, patient will need outpatient CT chest to be repeated in 1.5 months to assess left lung opacitiy.    -KATINA- most likely due to urinary retention, resolved  continue IVF    -h/o HTN - resume on home meds    diet: speech eval    daily GI and DVT proph.     overall prognosis: poor      #Progress Note Handoff: Pending Consults___none ______,Tests_______,Test Results__ABG today _____, other: monitor neuro status  Family discussion: patient's daughter by bedside  Disposition: to be determined

## 2019-07-02 LAB
ANION GAP SERPL CALC-SCNC: 12 MMOL/L — SIGNIFICANT CHANGE UP (ref 7–14)
BUN SERPL-MCNC: 3 MG/DL — LOW (ref 10–20)
CALCIUM SERPL-MCNC: 7.5 MG/DL — LOW (ref 8.5–10.1)
CHLORIDE SERPL-SCNC: 110 MMOL/L — SIGNIFICANT CHANGE UP (ref 98–110)
CO2 SERPL-SCNC: 26 MMOL/L — SIGNIFICANT CHANGE UP (ref 17–32)
CREAT SERPL-MCNC: 0.6 MG/DL — LOW (ref 0.7–1.5)
GLUCOSE BLDC GLUCOMTR-MCNC: 105 MG/DL — HIGH (ref 70–99)
GLUCOSE BLDC GLUCOMTR-MCNC: 93 MG/DL — SIGNIFICANT CHANGE UP (ref 70–99)
GLUCOSE BLDC GLUCOMTR-MCNC: 95 MG/DL — SIGNIFICANT CHANGE UP (ref 70–99)
GLUCOSE SERPL-MCNC: 100 MG/DL — HIGH (ref 70–99)
POTASSIUM SERPL-MCNC: 3.7 MMOL/L — SIGNIFICANT CHANGE UP (ref 3.5–5)
POTASSIUM SERPL-SCNC: 3.7 MMOL/L — SIGNIFICANT CHANGE UP (ref 3.5–5)
SODIUM SERPL-SCNC: 148 MMOL/L — HIGH (ref 135–146)

## 2019-07-02 PROCEDURE — 99233 SBSQ HOSP IP/OBS HIGH 50: CPT

## 2019-07-02 RX ORDER — NITROGLYCERIN 6.5 MG
1 CAPSULE, EXTENDED RELEASE ORAL DAILY
Refills: 0 | Status: DISCONTINUED | OUTPATIENT
Start: 2019-07-02 | End: 2019-07-12

## 2019-07-02 RX ADMIN — SODIUM CHLORIDE 100 MILLILITER(S): 9 INJECTION, SOLUTION INTRAVENOUS at 06:32

## 2019-07-02 RX ADMIN — Medication 3 MILLILITER(S): at 07:20

## 2019-07-02 RX ADMIN — HEPARIN SODIUM 5000 UNIT(S): 5000 INJECTION INTRAVENOUS; SUBCUTANEOUS at 17:39

## 2019-07-02 RX ADMIN — HEPARIN SODIUM 5000 UNIT(S): 5000 INJECTION INTRAVENOUS; SUBCUTANEOUS at 06:12

## 2019-07-02 RX ADMIN — Medication 3 MILLILITER(S): at 13:35

## 2019-07-02 RX ADMIN — Medication 1 PATCH: at 19:54

## 2019-07-02 RX ADMIN — Medication 1 PATCH: at 12:53

## 2019-07-02 RX ADMIN — Medication 3 MILLILITER(S): at 20:58

## 2019-07-02 RX ADMIN — CHLORHEXIDINE GLUCONATE 1 APPLICATION(S): 213 SOLUTION TOPICAL at 06:12

## 2019-07-02 RX ADMIN — SODIUM CHLORIDE 100 MILLILITER(S): 9 INJECTION, SOLUTION INTRAVENOUS at 17:45

## 2019-07-02 NOTE — DIETITIAN INITIAL EVALUATION ADULT. - ENERGY NEEDS
estimated energy needs: 990-1075kcal (MSJx1.2-1.3AF)  estimated protein needs: 46-55g (1.0-1.2g/kg)  estimated fluid needs: 1ml/kcal or per LIP

## 2019-07-02 NOTE — PROGRESS NOTE ADULT - SUBJECTIVE AND OBJECTIVE BOX
HPI:  86 y/f with PMH of DLD, HTN, recent hospitalization for pneumonia presents here from home with reported history of fever since yesterday. She is unable to provide any history, no family at bedside, couldnt reach Daughter Waleska Ocasio at this time inspite of trying multiple times, history based on ED chart. She is reported to have she was having fever x 1 day, she also seemed to be confused this AM, was given Bactrim which was left over from her prior infection but she threw up after taking it.     INTERVAL HPI/OVERNIGHT EVENTS:  Patient was seen and examined at bedside. She was on a BIPAP machine awake in bed. The patient was much more arousable, opened her eyes, and nodded when noticed me walking into the room . She was able to follow commands, was able to give me a handshake with each hand when requested, and squeezed my hand. She told me what her name was, but did not know where she was or what the year was. Said "no" to pain everywhere she was palpated this morning. Has a vázquez in. No BM due to NPO status.     ROS: Otherwise unremarkable    VITALS  T(C): 36 (07-02-19 @ 06:01), Max: 37.2 (07-01-19 @ 12:30)  HR: 66 (07-02-19 @ 06:01) (66 - 85)  BP: 156/67 (07-02-19 @ 06:01) (156/67 - 175/72)  RR: 18 (07-02-19 @ 06:01) (18 - 18)  SpO2: 98% (07-01-19 @ 20:04) (98% - 98%)  Wt(kg): --Vital Signs Last 24 Hrs  T(C): 36 (02 Jul 2019 06:01), Max: 37.2 (01 Jul 2019 12:30)  T(F): 96.8 (02 Jul 2019 06:01), Max: 98.9 (01 Jul 2019 12:30)  HR: 66 (02 Jul 2019 06:01) (66 - 85)  BP: 156/67 (02 Jul 2019 06:01) (156/67 - 175/72)  BP(mean): --  RR: 18 (02 Jul 2019 06:01) (18 - 18)  SpO2: 98% (01 Jul 2019 20:04) (98% - 98%)    MEDICATIONS (STANDING))  ALBUTerol/ipratropium for Nebulization 3 milliLiter(s) Nebulizer every 6 hours  atorvastatin 10 milliGRAM(s) Oral at bedtime  chlorhexidine 4% Liquid 1 Application(s) Topical <User Schedule>  dextrose 5%. 1000 milliLiter(s) IV Continuous <Continuous>  heparin  Injectable 5000 Unit(s) SubCutaneous every 12 hours  nitroglycerin    Patch 0.1 mG/Hr(s) 1 patch Transdermal daily      MEDICATIONS (PRN)  acetaminophen   Tablet .. 650 milliGRAM(s) Oral every 6 hours PRN Temp greater or equal to 38C (100.4F)  dextrose 50% Injectable 50 milliLiter(s) IV Push once PRN If Blood glucose is LESS than 70      ALLERGIES  No Known Allergies    PHYSICAL EXAM:  GENERAL: NAD, awake on BIPAP  HEAD:  Atraumatic, Normocephalic  NERVOUS SYSTEM: Alert, questonable if oriented. Verbal only when asked her name   CHEST/LUNG: Clear to auscultaton B/L, loud BIPAP sounds.   HEART: S1/S2, no murmurs.   ABDOMEN: Soft, non-distended. Denies tenderness    LABS:  07-02    148<H>  |  110  |  3<L>  ----------------------------<  100<H>  3.7   |  26  |  0.6<L>    Ca    7.5<L>      02 Jul 2019 05:58    POCT Glucose Trend  105 mg/dL07-02 @ 07:59  111 mg/dL07-01 @ 11:46  120 mg/dL07-01 @ 07:41  95 mg/dL06-30 @ 21:    MICROBIOLOGY  Culture - Urine (06.25.19 @ 11:23)    Specimen Source: .Urine Catheterized    Culture Results:   No growth    Culture - Blood (06.25.19 @ 10:00)    Specimen Source: .Blood Blood-Peripheral    Culture Results:   No growth at 5 days.    RADIOLOGY & ADDITIONAL TESTS:  < from: CT Abdomen and Pelvis No Cont (06.25.19 @ 13:32) >  IMPRESSION:     1. Since September 13, 2017, no definite evidence of acute/inflammatory   process within the abdomen or pelvis.    2. Status post cholecystectomy.    < from: MR Head No Cont (06.29.19 @ 11:02) >  IMPRESSION:     Limited by motion artifact.    No evidence of acute intracranial pathology.    Chronic microvascular ischemic changes and chronic lacunar infarctions.

## 2019-07-02 NOTE — DIETITIAN INITIAL EVALUATION ADULT. - PHYSICAL APPEARANCE
RD observes mild muscle depletion to temples. No significant wt loss within 1 month per EMR/previous admission. Current wt 101#. BMI: 19.8. IBW: 100#+/-10% 2+ edema R arm, nose wound from BIPAP noted.

## 2019-07-02 NOTE — DIETITIAN INITIAL EVALUATION ADULT. - FACTORS AFF FOOD INTAKE
Pt is nonverbal, RD attempted to reach emergency contact, however, unsuccessful. No nutr hx in EMR. Pt is NPO as pt with continuous BIPAP. RD spoke with RN at bedside, states LIP started to decrease BIPAP successfully, SLP reconsulted. Last BM 6/29- fecal in cont- no BM likely d/t NPO status. No allergies noted in EMR. To reattempt nutr hx upon f/u.

## 2019-07-02 NOTE — DIETITIAN INITIAL EVALUATION ADULT. - ADD RECOMMEND
Diet advancement per SLP/LIP. RD to follow up with appropriate recs upon f/u. Consider starting MVI/minerals as pt without PO intake since admission.

## 2019-07-02 NOTE — DIETITIAN INITIAL EVALUATION ADULT. - OTHER INFO
Pt presented to ED for a fever and AMS, noted recent hospitalization for PNA. Noted hypercapnic encephalopathy- improves with BIPAP. Attempted to wean to cannula and c/s SLP for change at PO trials. Hypernatremia resolved. KATINA- urinary retention related, resolved. GOC per resident note: Patient has not been tolerating PO feeds for several days. We will try to wean her off the BIPAP so that a PO challenge can be done. If that fails, then must decide between a PEG tube or a palliative care route. Daughter stated that she understood and will discuss these options with her brother.

## 2019-07-02 NOTE — PROGRESS NOTE ADULT - ASSESSMENT
Assessment:  85 y/o F w/ PMH of DLD, HTN, CHF, and recent hospitalization of PNA presented from home w/ fever and AMS. Patient is non-verbal.    #Hypercapnic Encephalopathy- improves w/ BIPAP  - Improves when on BIPAP. Will attempt to wean to cannula and consult speech pathology for chance at PO feeding  - Metabolic encephalopathy less likely d/t - BCX/UCX negative- No leukocytosis  - Ammonia elevated (59)  - EEG showed no seizure activity  - Folate/B12/TSH all WNL  - MR brain revealed chronic microvascular changes and chronic lacunar infarctions  - Monitor pulse ox  - CXR shows basilar effusions  - Pulmonary consult recommended weaning off BIPAP, albuterol nebulizers, IV steroids, outpatient PFT's, and GOC discussion    #Hypernatremia - resolved   - patient was on continuous IVF, was changed to D5 in water 100ml/hr  - Sodium, Serum: 148 mmol/L (07.02.19)    #Hypoglycemia?  - NPO as per speech pathology recommendations  - IVF D5NS@100ml/hr changed to D5 in water due to previous hypernatremia  - POCT reveals low/lower limit of normal blood glucose levels, even with D50 infusion  - Underlying neoplasm (i.e. insulinoma)?  - Infuse dextrose if <80   - Cortisol AM, Serum: 11.8 ug/dL (06.27.19 @ 21:16) WNL    #Acute urinary retention  - seen on renal/bladder US.  - Funez inserted  - monitor I&O    #KATINA  - Urinary retention related, resolved    Diet:  NPO due to high risk of aspiration    Goals of care:  Discussed with daughter. Patient has not been tolerating PO feeds for several days. We will try to wean her off the BIPAP so that a PO challenge can be done. If that fails, then must decide between a PEG tube or a palliative care route. Daughter stated that she understood and will discuss these options with her brother.

## 2019-07-03 LAB
ANION GAP SERPL CALC-SCNC: 7 MMOL/L — SIGNIFICANT CHANGE UP (ref 7–14)
BASOPHILS # BLD AUTO: 0.02 K/UL — SIGNIFICANT CHANGE UP (ref 0–0.2)
BASOPHILS NFR BLD AUTO: 0.4 % — SIGNIFICANT CHANGE UP (ref 0–1)
BUN SERPL-MCNC: <3 MG/DL — LOW (ref 10–20)
CALCIUM SERPL-MCNC: 7.3 MG/DL — LOW (ref 8.5–10.1)
CHLORIDE SERPL-SCNC: 114 MMOL/L — HIGH (ref 98–110)
CO2 SERPL-SCNC: 28 MMOL/L — SIGNIFICANT CHANGE UP (ref 17–32)
CREAT SERPL-MCNC: 0.5 MG/DL — LOW (ref 0.7–1.5)
EOSINOPHIL # BLD AUTO: 0 K/UL — SIGNIFICANT CHANGE UP (ref 0–0.7)
EOSINOPHIL NFR BLD AUTO: 0 % — SIGNIFICANT CHANGE UP (ref 0–8)
GLUCOSE BLDC GLUCOMTR-MCNC: 101 MG/DL — HIGH (ref 70–99)
GLUCOSE BLDC GLUCOMTR-MCNC: 80 MG/DL — SIGNIFICANT CHANGE UP (ref 70–99)
GLUCOSE BLDC GLUCOMTR-MCNC: 94 MG/DL — SIGNIFICANT CHANGE UP (ref 70–99)
GLUCOSE BLDC GLUCOMTR-MCNC: 97 MG/DL — SIGNIFICANT CHANGE UP (ref 70–99)
GLUCOSE SERPL-MCNC: 96 MG/DL — SIGNIFICANT CHANGE UP (ref 70–99)
HCT VFR BLD CALC: 36.5 % — LOW (ref 37–47)
HGB BLD-MCNC: 11.8 G/DL — LOW (ref 12–16)
IMM GRANULOCYTES NFR BLD AUTO: 1.8 % — HIGH (ref 0.1–0.3)
LYMPHOCYTES # BLD AUTO: 1.46 K/UL — SIGNIFICANT CHANGE UP (ref 1.2–3.4)
LYMPHOCYTES # BLD AUTO: 29.9 % — SIGNIFICANT CHANGE UP (ref 20.5–51.1)
MAGNESIUM SERPL-MCNC: 1.4 MG/DL — LOW (ref 1.8–2.4)
MCHC RBC-ENTMCNC: 29.8 PG — SIGNIFICANT CHANGE UP (ref 27–31)
MCHC RBC-ENTMCNC: 32.3 G/DL — SIGNIFICANT CHANGE UP (ref 32–37)
MCV RBC AUTO: 92.2 FL — SIGNIFICANT CHANGE UP (ref 81–99)
MONOCYTES # BLD AUTO: 0.24 K/UL — SIGNIFICANT CHANGE UP (ref 0.1–0.6)
MONOCYTES NFR BLD AUTO: 4.9 % — SIGNIFICANT CHANGE UP (ref 1.7–9.3)
NEUTROPHILS # BLD AUTO: 3.08 K/UL — SIGNIFICANT CHANGE UP (ref 1.4–6.5)
NEUTROPHILS NFR BLD AUTO: 63 % — SIGNIFICANT CHANGE UP (ref 42.2–75.2)
NRBC # BLD: 0 /100 WBCS — SIGNIFICANT CHANGE UP (ref 0–0)
PHOSPHATE SERPL-MCNC: 1.4 MG/DL — LOW (ref 2.1–4.9)
PLATELET # BLD AUTO: 215 K/UL — SIGNIFICANT CHANGE UP (ref 130–400)
POTASSIUM SERPL-MCNC: 3.3 MMOL/L — LOW (ref 3.5–5)
POTASSIUM SERPL-SCNC: 3.3 MMOL/L — LOW (ref 3.5–5)
RBC # BLD: 3.96 M/UL — LOW (ref 4.2–5.4)
RBC # FLD: 14.6 % — HIGH (ref 11.5–14.5)
SODIUM SERPL-SCNC: 149 MMOL/L — HIGH (ref 135–146)
WBC # BLD: 4.89 K/UL — SIGNIFICANT CHANGE UP (ref 4.8–10.8)
WBC # FLD AUTO: 4.89 K/UL — SIGNIFICANT CHANGE UP (ref 4.8–10.8)

## 2019-07-03 PROCEDURE — 99233 SBSQ HOSP IP/OBS HIGH 50: CPT

## 2019-07-03 PROCEDURE — 99497 ADVNCD CARE PLAN 30 MIN: CPT | Mod: 25

## 2019-07-03 PROCEDURE — 71045 X-RAY EXAM CHEST 1 VIEW: CPT | Mod: 26

## 2019-07-03 PROCEDURE — 99231 SBSQ HOSP IP/OBS SF/LOW 25: CPT

## 2019-07-03 RX ORDER — MAGNESIUM SULFATE 500 MG/ML
2 VIAL (ML) INJECTION ONCE
Refills: 0 | Status: COMPLETED | OUTPATIENT
Start: 2019-07-03 | End: 2019-07-03

## 2019-07-03 RX ORDER — POTASSIUM CHLORIDE 20 MEQ
20 PACKET (EA) ORAL
Refills: 0 | Status: COMPLETED | OUTPATIENT
Start: 2019-07-03 | End: 2019-07-03

## 2019-07-03 RX ADMIN — CHLORHEXIDINE GLUCONATE 1 APPLICATION(S): 213 SOLUTION TOPICAL at 05:12

## 2019-07-03 RX ADMIN — Medication 50 GRAM(S): at 11:44

## 2019-07-03 RX ADMIN — Medication 1 PATCH: at 19:40

## 2019-07-03 RX ADMIN — Medication 1 PATCH: at 00:27

## 2019-07-03 RX ADMIN — Medication 1 PATCH: at 14:48

## 2019-07-03 RX ADMIN — HEPARIN SODIUM 5000 UNIT(S): 5000 INJECTION INTRAVENOUS; SUBCUTANEOUS at 05:12

## 2019-07-03 RX ADMIN — Medication 3 MILLILITER(S): at 19:38

## 2019-07-03 RX ADMIN — Medication 50 MILLIEQUIVALENT(S): at 14:54

## 2019-07-03 RX ADMIN — Medication 50 MILLIEQUIVALENT(S): at 17:27

## 2019-07-03 RX ADMIN — HEPARIN SODIUM 5000 UNIT(S): 5000 INJECTION INTRAVENOUS; SUBCUTANEOUS at 17:29

## 2019-07-03 NOTE — PROGRESS NOTE ADULT - ASSESSMENT
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  - continue IVF- D5W@100ml/hr  -weaning off bipap as tolerated  -monitor o2 sat  -palliative care input apprecaited t.    - Hypernatremia- with dehydration state  -improving na today 149  -monitor bmp daily  -cont ivf D5w @100 ml/hr       -Acute Urinary retention- seen on renal/bladder US- -good urine output with vázquez--monitor i/o      -Recently tx. pneumonia- pt. completed tx., CXR- persistent left basilar opacity- no new pneumonia   - underlying neoplasm can not be r/o, patient will need outpatient CT chest to be repeated in 1.5 months to assess left lung opacitiy.    diet: speech eval    #GI and DVT proph.     overall poor prognosis    #Progress Note Handoff  Pending (specify): anticipate possible palliative/comfort measures only  Disposition:Unknown at this time

## 2019-07-03 NOTE — PROGRESS NOTE ADULT - SUBJECTIVE AND OBJECTIVE BOX
HPI:  86 y/f with PMH of DLD, HTN, recent hospitalization for pneumonia presents here from home with reported history of fever since yesterday. She is unable to provide any history, no family at bedside, couldnt reach Daughter Waleska Ocasio at this time inspite of trying multiple times, history based on ED chart. She is reported to have she was having fever x 1 day, she also seemed to be confused this AM, was given Bactrim which was left over from her prior infection but she threw up after taking it.     INTERVAL HPI/OVERNIGHT EVENTS:  Patient was seen and examined at bedside. She was on a BIPAP machine awake in bed with the daughter by the bedside. The patient was much more arousable, opened her eyes, and looked in my direction when noticed me walking into the room. She was able to follow commands, was not able to answer whether something troubled her. Could not communicate specifics about ROS. Said "no" to pain everywhere she was palpated this morning. Had a BM overnight.      ROS: Otherwise unremarkable      VITALS  T(C): 35.6 (07-03-19 @ 06:06), Max: 35.9 (07-02-19 @ 13:45)  HR: 75 (07-03-19 @ 06:06) (73 - 76)  BP: 145/85 (07-03-19 @ 06:06) (136/68 - 150/80)  RR: 17 (07-03-19 @ 06:06) (17 - 18)  SpO2: 100% (07-03-19 @ 08:07) (97% - 100%)  Wt(kg): --Vital Signs Last 24 Hrs  T(C): 35.6 (03 Jul 2019 06:06), Max: 35.9 (02 Jul 2019 13:45)  T(F): 96.1 (03 Jul 2019 06:06), Max: 96.7 (02 Jul 2019 13:45)  HR: 75 (03 Jul 2019 06:06) (73 - 76)  BP: 145/85 (03 Jul 2019 06:06) (136/68 - 150/80)  BP(mean): --  RR: 17 (03 Jul 2019 06:06) (17 - 18)  SpO2: 100% (03 Jul 2019 08:07) (97% - 100%)    MEDICATIONS (STANDING))  ALBUTerol/ipratropium for Nebulization 3 milliLiter(s) Nebulizer every 6 hours  atorvastatin 10 milliGRAM(s) Oral at bedtime  chlorhexidine 4% Liquid 1 Application(s) Topical <User Schedule>  dextrose 5%. 1000 milliLiter(s) IV Continuous <Continuous>  heparin  Injectable 5000 Unit(s) SubCutaneous every 12 hours  magnesium sulfate  IVPB 2 Gram(s) IV Intermittent once  nitroglycerin    Patch 0.1 mG/Hr(s) 1 patch Transdermal daily  potassium chloride  20 mEq/100 mL IVPB 20 milliEquivalent(s) IV Intermittent every 2 hours      MEDICATIONS (PRN)  acetaminophen   Tablet .. 650 milliGRAM(s) Oral every 6 hours PRN Temp greater or equal to 38C (100.4F)  dextrose 50% Injectable 50 milliLiter(s) IV Push once PRN If Blood glucose is LESS than 70      ALLERGIES  No Known Allergies      PHYSICAL EXAM:  GENERAL: NAD, awake on BIPAP  HEAD:  Atraumatic, Normocephalic  NERVOUS SYSTEM: Alert, questonable if oriented. Minimally verbal  CHEST/LUNG: Clear to auscultaton B/L, loud BIPAP sounds.   HEART: S1/S2, no murmurs.   ABDOMEN: Soft, non-distended. Denies tenderness      LABS:                        11.8   4.89  )-----------( 215      ( 03 Jul 2019 06:49 )             36.5   07-03    149<H>  |  114<H>  |  <3<L>  ----------------------------<  96  3.3<L>   |  28  |  0.5<L>    Ca    7.3<L>      03 Jul 2019 06:49  Phos  1.4     07-03  Mg     1.4     07-03    POCT Glucose Trend  97 mg/dL07-03 @ 08:05  95 mg/dL07-02 @ 22:08  93 mg/dL07-02 @ 16:47  105 mg/dL07-02 @ 07:59    RADIOLOGY & ADDITIONAL TESTS:  < from: Xray Chest 1 View- PORTABLE-Urgent (07.03.19 @ 10:10) >  Impression:      Cardiomegaly with bilateral effusions.    Worsening.

## 2019-07-03 NOTE — GOALS OF CARE CONVERSATION - PERSONAL ADVANCE DIRECTIVE - CONVERSATION/DISCUSSION
Diagnosis/MOLST Discussed/Prognosis/Treatment Options/Palliative Care Referral
Prognosis/MOLST Discussed/Treatment Options/Diagnosis

## 2019-07-03 NOTE — PROGRESS NOTE ADULT - SUBJECTIVE AND OBJECTIVE BOX
Patient is a 86y old  Female who presents with a chief complaint of Fever since yesterday (03 Jul 2019 15:40)    HPI:  86 y/f with PMH of DLD, HTN, recent hospitalization for pneumonia presents here from home with reported history of fever since yesterday. She is unable to provide any history, no family at bedside, couldnt reach Daughter Waleska Ocasio at this time inspite of trying multiple times, history based on ED chart. She is reported to have she was having fever x 1 day, she also seemed to be confused this AM, was given Bactrim which was left over from her prior infection but she threw up after taking it.   In ED she was hypotensive to SBP 97, with elevated lactate. She was given 3400 ml of LR, after which her BP improved and lactate decreased. (25 Jun 2019 15:06)    PAST MEDICAL & SURGICAL HISTORY:  HLD (hyperlipidemia)  Diastolic dysfunction  HTN (hypertension)  History of cholecystectomy  H/O bilateral hip replacements: Right Hip ORIF on Xray    Patient seen and examined bedside independently on morning rounds for the first time today, chart reviewed and d/w the medicine resident and on interdisciplinary rounds    tolerating time today off bipap--cont to monitor o2 sat and respiratory status closely- f/u with family (ie. goals of care consideration for hospice/comfort)- DNR/DNI    Vital Signs Last 24 Hrs  T(C): 35.9 (03 Jul 2019 13:58), Max: 35.9 (03 Jul 2019 13:58)  T(F): 96.7 (03 Jul 2019 13:58), Max: 96.7 (03 Jul 2019 13:58)  HR: 62 (03 Jul 2019 13:58) (62 - 76)  BP: 109/56 (03 Jul 2019 13:58) (109/56 - 150/80)  BP(mean): --  RR: 18 (03 Jul 2019 13:58) (17 - 18)  SpO2: 96% (03 Jul 2019 11:35) (96% - 100%)                        11.8   4.89  )-----------( 215      ( 03 Jul 2019 06:49 )             36.5     07-03    149<H>  |  114<H>  |  <3<L>  ----------------------------<  96  3.3<L>   |  28  |  0.5<L>    Ca    7.3<L>      03 Jul 2019 06:49  Phos  1.4     07-03  Mg     1.4     07-03                MEDICATIONS  (STANDING):  ALBUTerol/ipratropium for Nebulization 3 milliLiter(s) Nebulizer every 6 hours  atorvastatin 10 milliGRAM(s) Oral at bedtime  chlorhexidine 4% Liquid 1 Application(s) Topical <User Schedule>  dextrose 5%. 1000 milliLiter(s) (100 mL/Hr) IV Continuous <Continuous>  heparin  Injectable 5000 Unit(s) SubCutaneous every 12 hours  nitroglycerin    Patch 0.1 mG/Hr(s) 1 patch Transdermal daily  potassium chloride  20 mEq/100 mL IVPB 20 milliEquivalent(s) IV Intermittent every 2 hours

## 2019-07-03 NOTE — PROGRESS NOTE ADULT - ASSESSMENT
86yFemale history as above being evaluated for GOC; no symptoms to address at present; over the course of the day weaned off bipap and passed S/S so diet being advanced.     See GOC note. Meet with children for >15minutes to discuss diagnosis, prognosis. Children are hopeful that patient will return to baseline. Introduce Palliative Care      Recommendations:  Continue GOC conversations    DNR/I    Will follow     Please Call [phone] PRN

## 2019-07-03 NOTE — PROGRESS NOTE ADULT - ASSESSMENT
IMPRESSION    chronic hypercapnic respiratory failure  Doubt C02 narcosis as cause of encephalopathy   Sepsis-resolved    Plan   -- currently on 4 L NC sat 97%  ,  -- patient is a co2 retainer , abg in favor of chronic hypercapnia   -- use BiPAP at night and prn  -- use supplemental oxygen to keep sat 88-92%  -- CTH, MR HEAD reviewed   -- Albuterol nebs prn  -- ALYCE/ OHS? will need outpatient sleep study and FTs  -- No steroids or abx recommended  -- HOB elevation 45 degrees  -- speech and swallow eval  -- dvt ppx  -- recommend palliative eval  -- DNR/DNI   guarded prognosis IMPRESSION    chronic hypercapnic respiratory failure  Doubt C02 narcosis as cause of encephalopathy   Sepsis-resolved    Plan   -- currently on 4 L NC sat 97%  ,  -- patient is a co2 retainer , abg in favor of chronic hypercapnia   -- use BiPAP at night and prn  -- use supplemental oxygen   -- give IV lasix x 1   -- incentive spirometry  -- avoid opiods, other CNS depressants   -- supplemental oxygen to keep sat 88-92%  -- CTH, MR HEAD reviewed   -- Albuterol nebs prn  -- ALYCE?  will need outpatient sleep study and PFTs  -- No steroids or abx recommended  -- HOB elevation 45 degrees  -- speech and swallow eval  -- dvt ppx  -- recommend palliative eval  -- DNR/DNI   guarded prognosis

## 2019-07-03 NOTE — PROGRESS NOTE ADULT - SUBJECTIVE AND OBJECTIVE BOX
Chart reviewed: Day 8    KYRA MENDIOLA 86yFemale  HPI:  86 y/f with PMH of DLD, HTN, recent hospitalization for pneumonia presents here from home with reported history of fever since yesterday. She is unable to provide any history, no family at bedside, couldnt reach Daughter Waleska Ocasio at this time inspite of trying multiple times, history based on ED chart. She is reported to have she was having fever x 1 day, she also seemed to be confused this AM, was given Bactrim which was left over from her prior infection but she threw up after taking it.   In ED she was hypotensive to SBP 97, with elevated lactate. She was given 3400 ml of LR, after which her BP improved and lactate decreased. (25 Jun 2019 15:06)    PAST MEDICAL & SURGICAL HISTORY:  HLD (hyperlipidemia)  Diastolic dysfunction  HTN (hypertension)  History of cholecystectomy  H/O bilateral hip replacements: Right Hip ORIF on Xray        Today,  Discussed with housestaff, nursing, pulm  seen on several rounds on bipap and then again off bipap - tolerating  no complaints, denies pain/dyspnea, bipap does not bother her    PHYSICAL EXAM:    Constitutional: appears age; pleasant    Eyes: sclera white    Respiratory: toerated wean of bipap today to nc  rr easy and unlabored 18-22    Extremities: warm; no edema    Neurological: A&O self; able to tell me children's names; no focal deficits; follow simple commands with some apraxia      T(C): 35.9, Max: 35.9 (13:58)  HR: 62 (62 - 76)  BP: 109/56 (109/56 - 150/80)  RR: 18 (17 - 18)  SpO2: 96% (96% - 100%)      LABS/STUDIES:  07-03    149<H>  |  114<H>  |  <3<L>  ----------------------------<  96  3.3<L>   |  28  |  0.5<L>    Ca    7.3<L>      03 Jul 2019 06:49  Phos  1.4     07-03  Mg     1.4     07-03                           11.8   4.89  )-----------( 215      ( 03 Jul 2019 06:49 )             36.5       MEDICATIONS  (STANDING):  ALBUTerol/ipratropium for Nebulization 3 milliLiter(s) Nebulizer every 6 hours  atorvastatin 10 milliGRAM(s) Oral at bedtime  chlorhexidine 4% Liquid 1 Application(s) Topical <User Schedule>  dextrose 5%. 1000 milliLiter(s) (100 mL/Hr) IV Continuous <Continuous>  heparin  Injectable 5000 Unit(s) SubCutaneous every 12 hours  nitroglycerin    Patch 0.1 mG/Hr(s) 1 patch Transdermal daily  potassium chloride  20 mEq/100 mL IVPB 20 milliEquivalent(s) IV Intermittent every 2 hours    MEDICATIONS  (PRN):  acetaminophen   Tablet .. 650 milliGRAM(s) Oral every 6 hours PRN Temp greater or equal to 38C (100.4F)  dextrose 50% Injectable 50 milliLiter(s) IV Push once PRN If Blood glucose is LESS than 70          PPS (Palliative Performance Scale): 30%  at home had HHA and attended the [employer]  family has difficulty answering questions about memory/function; lives daughter who self-admitts getting emotional

## 2019-07-03 NOTE — PROGRESS NOTE ADULT - SUBJECTIVE AND OBJECTIVE BOX
Patient is a 86y old  Female admitted for  AMS     SUBJECTIVE:patient evaluated at bedside , sat 98 % 4L NC . No acute events overnight      REVIEW OF SYSTEMS:  See HPI    PHYSICAL EXAM  Vital Signs Last 24 Hrs  T(C): 35.6 (03 Jul 2019 06:06), Max: 35.9 (02 Jul 2019 13:45)  T(F): 96.1 (03 Jul 2019 06:06), Max: 96.7 (02 Jul 2019 13:45)  HR: 75 (03 Jul 2019 06:06) (73 - 76)  BP: 145/85 (03 Jul 2019 06:06) (136/68 - 150/80)  BP(mean): --  RR: 17 (03 Jul 2019 06:06) (17 - 18)  SpO2: 96% (03 Jul 2019 11:35) (96% - 100%)    General: In NAD  HEENT: IBETH               Lymphatic system: No Cervical LN    Respiratory: fine crackles RLL , no wheezing  Cardiovascular: Regular  Gastrointestinal: Soft. + BS  Musculoskeletal: No clubbing.  moves all extremities.     Skin: Warm.  Intact  Neurological: No motor or sensory deficit      07-02-19 @ 07:01  -  07-03-19 @ 07:00  --------------------------------------------------------  IN:    dextrose 5%.: 900 mL  Total IN: 900 mL    OUT:    Indwelling Catheter - Urethral: 1800 mL  Total OUT: 1800 mL    Total NET: -900 mL          LABS:                          11.8   4.89  )-----------( 215      ( 03 Jul 2019 06:49 )             36.5                                               07-03    149<H>  |  114<H>  |  <3<L>  ----------------------------<  96  3.3<L>   |  28  |  0.5<L>    Ca    7.3<L>      03 Jul 2019 06:49  Phos  1.4     07-03  Mg     1.4     07-03    Blood Gas Profile - Arterial (07.01.19 @ 12:29)    pH, Arterial: 7.35: pt is on N/C 4L    pCO2, Arterial: 50: pt is on N/C 4L mmHg    pO2, Arterial: 123: pt is on N/C 4L mmHg    HCO3, Arterial: 28: pt is on N/C 4L mmoL/L    Base Excess, Arterial: 1.4: pt is on N/C 4L mmoL/L    Oxygen Saturation, Arterial: 99: pt is on N/C 4L %    FIO2, Arterial: 36: pt is on N/C 4L    Blood Gas Source Arterial: Arterial: pt is on N/C 4L          MEDICATIONS  (STANDING):  ALBUTerol/ipratropium for Nebulization 3 milliLiter(s) Nebulizer every 6 hours  atorvastatin 10 milliGRAM(s) Oral at bedtime  chlorhexidine 4% Liquid 1 Application(s) Topical <User Schedule>  dextrose 5%. 1000 milliLiter(s) (100 mL/Hr) IV Continuous <Continuous>  heparin  Injectable 5000 Unit(s) SubCutaneous every 12 hours  nitroglycerin    Patch 0.1 mG/Hr(s) 1 patch Transdermal daily  potassium chloride  20 mEq/100 mL IVPB 20 milliEquivalent(s) IV Intermittent every 2 hours    MEDICATIONS  (PRN):  acetaminophen   Tablet .. 650 milliGRAM(s) Oral every 6 hours PRN Temp greater or equal to 38C (100.4F)  dextrose 50% Injectable 50 milliLiter(s) IV Push once PRN If Blood glucose is LESS than 70

## 2019-07-03 NOTE — SWALLOW BEDSIDE ASSESSMENT ADULT - SLP PERTINENT HISTORY OF CURRENT PROBLEM
Pt presented w/ fever x1 day; recent hospitalization for PNA, known to ST service w/ reccs on 6/11 for dysphagia 3 w/ nectar thick liquids. PMHx: DLD, HTN. MD reccs for EEG and MRI to r/o new CVA. Pt was weaned off bipap for 3L O2 via nasal cannula.

## 2019-07-03 NOTE — SWALLOW BEDSIDE ASSESSMENT ADULT - SWALLOW EVAL: DIAGNOSIS
+toleration for puree, soft, nectar thick liquids and thin liquids w/o overt s/s penetration/aspiration.

## 2019-07-03 NOTE — SWALLOW BEDSIDE ASSESSMENT ADULT - COMMENTS
+toleration w/o overt s/s penetration/aspiration w/ puree +toleration w/o overt s/s penetration/aspiration w/ soft +toleration w/o overt s/s penetration/aspiration w/ thin liquids

## 2019-07-03 NOTE — PROGRESS NOTE ADULT - ASSESSMENT
Assessment:  87 y/o F w/ PMH of DLD, HTN, CHF, and recent hospitalization of PNA presented from home w/ fever and AMS. Patient is non-verbal.    #Hypercapnic Encephalopathy- improves w/ BIPAP  - Improves when on BIPAP. Will attempt to wean to cannula and consult speech pathology for chance at PO feeding  - Metabolic encephalopathy less likely d/t - BCX/UCX negative- No leukocytosis  - Ammonia elevated (59)  - EEG showed no seizure activity  - Folate/B12/TSH all WNL  - MR brain revealed chronic microvascular changes and chronic lacunar infarctions  - Monitor pulse ox  - CXR shows basilar effusions  - Pulmonary consult recommended weaning off BIPAP, albuterol nebulizers, IV steroids, outpatient PFT's, and GOC discussion    #Hypernatremia - resolved   - patient was on continuous IVF, was changed to D5 in water 100ml/hr  - Sodium, Serum: 148 mmol/L (07.02.19)    #Hypoglycemia?  - NPO as per speech pathology recommendations  - IVF D5NS@100ml/hr changed to D5 in water due to previous hypernatremia  - POCT reveals low/lower limit of normal blood glucose levels, even with D50 infusion  - Underlying neoplasm (i.e. insulinoma)?  - Infuse dextrose if <80   - Cortisol AM, Serum: 11.8 ug/dL (06.27.19 @ 21:16) WNL    #Acute urinary retention  - seen on renal/bladder US.  - Funez inserted  - monitor I&O    #KATINA  - Urinary retention related, resolved    Diet:  NPO due to high risk of aspiration    Goals of care:  Discussed with daughter. Patient has not been tolerating PO feeds for several days. We will try to wean her off the BIPAP so that a PO challenge can be done. If that fails, then must decide between a PEG tube or a palliative care route. Daughter stated that she understood and will discuss these options with her brother. Assessment:  87 y/o F w/ PMH of DLD, HTN, CHF, and recent hospitalization of PNA presented from home w/ fever and AMS. Patient is non-verbal.    #Hypercapnic Encephalopathy- improves w/ BIPAP  - Improves when on BIPAP. Failed weaning attempt yesterday (7/2). Will attempt to wean to cannula and consult speech pathology for chance at PO feeding today (7/3) again.  - Metabolic encephalopathy less likely d/t - BCX/UCX negative- No leukocytosis  - Ammonia elevated (59)  - EEG showed no seizure activity  - Folate/B12/TSH all WNL  - MR brain revealed chronic microvascular changes and chronic lacunar infarctions  - Monitor pulse ox  - CXR shows basilar effusions  - Pulmonary consult recommended weaning off BIPAP, albuterol nebulizers, IV steroids, outpatient PFT's, and GOC discussion  - Palliative care consulted  - Pulmonology is following the case    #Hypernatremia - resolved   - patient was on continuous IVF, was changed to D5 in water 100ml/hr  - Sodium, Serum: 149 mmol/L (07.03.19)    #Hypoglycemia?  - NPO as per speech pathology recommendations  - IVF D5NS@100ml/hr changed to D5 in water due to previous hypernatremia  - POCT reveals low/lower limit of normal blood glucose levels, even with D50 infusion  - Underlying neoplasm (i.e. insulinoma)?  - Infuse dextrose if <80   - Cortisol AM, Serum: 11.8 ug/dL (06.27.19 @ 21:16) WNL    #Acute urinary retention  - seen on renal/bladder US.  - Funez inserted  - monitor I&O    #KATINA  - Urinary retention related, resolved    Diet:  NPO due to high risk of aspiration    Goals of care:  Discussed with daughter. Patient has not been tolerating PO feeds for several days. We will try to wean her off the BIPAP so that a PO challenge can be done. If that fails, then must decide between a PEG tube or a palliative care route. Daughter stated that she understood and will discuss these options with her brother.

## 2019-07-03 NOTE — GOALS OF CARE CONVERSATION - PERSONAL ADVANCE DIRECTIVE - CONVERSATION DETAILS
Patient's medical condition was d/w patient's daughter, who is a caregiver and decision maker for patients medical treatment plan. Patient's daughter agreed for patient to be DNR/DNI, with trial of antibiotics/IV fluids/NGT tx. if needed. Will consult Palliative care team to further discuss possibility to consider hospice eval. patient patient's clinical condition will not improve despite medical tx.
Patient is a 86 y/f with PMH of DLD, HTN, recent hospitalization for pneumonia presents here from home with reported history of fever since yesterday. She is unable to provide any history, no family at bedside, couldn't reach Daughter Waleska Ocasio at this time inspite of trying multiple times, history based on ED chart. She is reported to have she was having fever x 1 day, she also seemed to be confused this AM, was given Bactrim which was left over from her prior infection but she threw up after taking it.    Palliative care team in coordination with unit staff met with patient's family to discuss overall goals of care. Reviewed patient's current diagnosis, and treatment options.  Family received an update from pulmonary and resident physician. All questions answered and family verbalized understanding.  Family relating patient was residing with daughter in a private home, had a HHA 8hrs daily and visited a senior center 5x days week. Family stating she was active and did not have memory issues.  Palliative care introduced and will follow as needed.    Plan is to continue ongoing medical management. Families goal is for patient to return to baseline. Family is opting for DNR/DNI.

## 2019-07-04 LAB
ALBUMIN SERPL ELPH-MCNC: 2.5 G/DL — LOW (ref 3.5–5.2)
ALP SERPL-CCNC: 58 U/L — SIGNIFICANT CHANGE UP (ref 30–115)
ALT FLD-CCNC: 12 U/L — SIGNIFICANT CHANGE UP (ref 0–41)
ANION GAP SERPL CALC-SCNC: 6 MMOL/L — LOW (ref 7–14)
AST SERPL-CCNC: 15 U/L — SIGNIFICANT CHANGE UP (ref 0–41)
BASOPHILS # BLD AUTO: 0.01 K/UL — SIGNIFICANT CHANGE UP (ref 0–0.2)
BASOPHILS NFR BLD AUTO: 0.2 % — SIGNIFICANT CHANGE UP (ref 0–1)
BILIRUB SERPL-MCNC: 0.3 MG/DL — SIGNIFICANT CHANGE UP (ref 0.2–1.2)
BUN SERPL-MCNC: <3 MG/DL — LOW (ref 10–20)
CALCIUM SERPL-MCNC: 7 MG/DL — LOW (ref 8.5–10.1)
CHLORIDE SERPL-SCNC: 112 MMOL/L — HIGH (ref 98–110)
CO2 SERPL-SCNC: 29 MMOL/L — SIGNIFICANT CHANGE UP (ref 17–32)
CREAT SERPL-MCNC: 0.6 MG/DL — LOW (ref 0.7–1.5)
EOSINOPHIL # BLD AUTO: 0 K/UL — SIGNIFICANT CHANGE UP (ref 0–0.7)
EOSINOPHIL NFR BLD AUTO: 0 % — SIGNIFICANT CHANGE UP (ref 0–8)
GLUCOSE BLDC GLUCOMTR-MCNC: 92 MG/DL — SIGNIFICANT CHANGE UP (ref 70–99)
GLUCOSE BLDC GLUCOMTR-MCNC: 96 MG/DL — SIGNIFICANT CHANGE UP (ref 70–99)
GLUCOSE BLDC GLUCOMTR-MCNC: 98 MG/DL — SIGNIFICANT CHANGE UP (ref 70–99)
GLUCOSE SERPL-MCNC: 100 MG/DL — HIGH (ref 70–99)
HCT VFR BLD CALC: 37.2 % — SIGNIFICANT CHANGE UP (ref 37–47)
HGB BLD-MCNC: 11.7 G/DL — LOW (ref 12–16)
IMM GRANULOCYTES NFR BLD AUTO: 2.1 % — HIGH (ref 0.1–0.3)
LYMPHOCYTES # BLD AUTO: 1.43 K/UL — SIGNIFICANT CHANGE UP (ref 1.2–3.4)
LYMPHOCYTES # BLD AUTO: 33.3 % — SIGNIFICANT CHANGE UP (ref 20.5–51.1)
MAGNESIUM SERPL-MCNC: 1.7 MG/DL — LOW (ref 1.8–2.4)
MCHC RBC-ENTMCNC: 29.5 PG — SIGNIFICANT CHANGE UP (ref 27–31)
MCHC RBC-ENTMCNC: 31.5 G/DL — LOW (ref 32–37)
MCV RBC AUTO: 93.7 FL — SIGNIFICANT CHANGE UP (ref 81–99)
MONOCYTES # BLD AUTO: 0.28 K/UL — SIGNIFICANT CHANGE UP (ref 0.1–0.6)
MONOCYTES NFR BLD AUTO: 6.5 % — SIGNIFICANT CHANGE UP (ref 1.7–9.3)
NEUTROPHILS # BLD AUTO: 2.48 K/UL — SIGNIFICANT CHANGE UP (ref 1.4–6.5)
NEUTROPHILS NFR BLD AUTO: 57.9 % — SIGNIFICANT CHANGE UP (ref 42.2–75.2)
NRBC # BLD: 0 /100 WBCS — SIGNIFICANT CHANGE UP (ref 0–0)
PHOSPHATE SERPL-MCNC: 1.8 MG/DL — LOW (ref 2.1–4.9)
PLATELET # BLD AUTO: 213 K/UL — SIGNIFICANT CHANGE UP (ref 130–400)
POTASSIUM SERPL-MCNC: 3.7 MMOL/L — SIGNIFICANT CHANGE UP (ref 3.5–5)
POTASSIUM SERPL-SCNC: 3.7 MMOL/L — SIGNIFICANT CHANGE UP (ref 3.5–5)
PROT SERPL-MCNC: 4.3 G/DL — LOW (ref 6–8)
RBC # BLD: 3.97 M/UL — LOW (ref 4.2–5.4)
RBC # FLD: 14.7 % — HIGH (ref 11.5–14.5)
SODIUM SERPL-SCNC: 147 MMOL/L — HIGH (ref 135–146)
WBC # BLD: 4.29 K/UL — LOW (ref 4.8–10.8)
WBC # FLD AUTO: 4.29 K/UL — LOW (ref 4.8–10.8)

## 2019-07-04 PROCEDURE — 99233 SBSQ HOSP IP/OBS HIGH 50: CPT

## 2019-07-04 RX ADMIN — ATORVASTATIN CALCIUM 10 MILLIGRAM(S): 80 TABLET, FILM COATED ORAL at 21:14

## 2019-07-04 RX ADMIN — HEPARIN SODIUM 5000 UNIT(S): 5000 INJECTION INTRAVENOUS; SUBCUTANEOUS at 17:12

## 2019-07-04 RX ADMIN — Medication 1 PATCH: at 20:39

## 2019-07-04 RX ADMIN — Medication 3 MILLILITER(S): at 20:44

## 2019-07-04 RX ADMIN — Medication 1 PATCH: at 11:27

## 2019-07-04 RX ADMIN — Medication 1 PATCH: at 23:05

## 2019-07-04 NOTE — PROGRESS NOTE ADULT - ASSESSMENT
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  - continue IVF- D5W@100ml/hr  -monitor o2 sat  -palliative care input apprecaited t.    - Hypernatremia- with dehydration state  -improving na today 147  -monitor bmp daily  -cont ivf D5w @100 ml/hr       -Acute Urinary retention- seen on renal/bladder US- -good urine output--monitor i/o      -Recently tx. pneumonia- pt. completed tx., CXR- persistent left basilar opacity- no new pneumonia   - underlying neoplasm can not be r/o, patient will need outpatient CT chest to be repeated in 1.5 months to assess left lung opacitiy.    diet: speech eval    #GI and DVT proph.   DNR/DNI    overall poor prognosis    #Progress Note Handoff  Disposition:Unknown at this time

## 2019-07-04 NOTE — PROGRESS NOTE ADULT - SUBJECTIVE AND OBJECTIVE BOX
Patient is a 86y old  Female who presents with a chief complaint of Fever since yesterday (03 Jul 2019 15:40)    HPI:  86 y/f with PMH of DLD, HTN, recent hospitalization for pneumonia presents here from home with reported history of fever since yesterday. She is unable to provide any history, no family at bedside, couldnt reach Daughter Waleska Ocasio at this time inspite of trying multiple times, history based on ED chart. She is reported to have she was having fever x 1 day, she also seemed to be confused this AM, was given Bactrim which was left over from her prior infection but she threw up after taking it.   In ED she was hypotensive to SBP 97, with elevated lactate. She was given 3400 ml of LR, after which her BP improved and lactate decreased. (25 Jun 2019 15:06)    PAST MEDICAL & SURGICAL HISTORY:  HLD (hyperlipidemia)  Diastolic dysfunction  HTN (hypertension)  History of cholecystectomy  H/O bilateral hip replacements: Right Hip ORIF on Xray    Patient seen and examined bedside independently on morning rounds,  chart reviewed and d/w the medicine resident on call    tolerating transition to nasal canula---doing much better today-  - DNR/DNI    PE:  GEN-NAD, AAOx3, elderly cachectic  PULM-  fair air entry with fine expirtory crackles  CVS- +s1/s2 RRR no murmurs  GI- soft NT ND +bs, no rebound, no guarding      Labs:                        11.7   4.29  )-----------( 213      ( 04 Jul 2019 06:17 )             37.2     CBC Full  -  ( 04 Jul 2019 06:17 )  WBC Count : 4.29 K/uL  RBC Count : 3.97 M/uL  Hemoglobin : 11.7 g/dL  Hematocrit : 37.2 %  Platelet Count - Automated : 213 K/uL  Mean Cell Volume : 93.7 fL  Mean Cell Hemoglobin : 29.5 pg  Mean Cell Hemoglobin Concentration : 31.5 g/dL  Auto Neutrophil # : 2.48 K/uL  Auto Lymphocyte # : 1.43 K/uL  Auto Monocyte # : 0.28 K/uL  Auto Eosinophil # : 0.00 K/uL  Auto Basophil # : 0.01 K/uL  Auto Neutrophil % : 57.9 %  Auto Lymphocyte % : 33.3 %  Auto Monocyte % : 6.5 %  Auto Eosinophil % : 0.0 %  Auto Basophil % : 0.2 %      07-04    147<H>  |  112<H>  |  <3<L>  ----------------------------<  100<H>  3.7   |  29  |  0.6<L>    Ca    7.0<L>      04 Jul 2019 06:17  Phos  1.8     07-04  Mg     1.7     07-04    TPro  4.3<L>  /  Alb  2.5<L>  /  TBili  0.3  /  DBili  x   /  AST  15  /  ALT  12  /  AlkPhos  58  07-04      Microbiology:      Vital Signs Last 24 Hrs  T(C): 35.9 (04 Jul 2019 12:12), Max: 35.9 (04 Jul 2019 12:12)  T(F): 96.6 (04 Jul 2019 12:12), Max: 96.6 (04 Jul 2019 12:12)  HR: 67 (04 Jul 2019 12:12) (67 - 76)  BP: 132/60 (04 Jul 2019 12:12) (106/52 - 159/68)  BP(mean): --  RR: 18 (04 Jul 2019 12:12) (17 - 18)  SpO2: 100% (04 Jul 2019 07:51) (100% - 100%)    I&O's Summary    03 Jul 2019 07:01  -  04 Jul 2019 07:00  --------------------------------------------------------  IN: 0 mL / OUT: 1950 mL / NET: -1950 mL    04 Jul 2019 07:01  -  04 Jul 2019 15:00  --------------------------------------------------------  IN: 0 mL / OUT: 450 mL / NET: -450 mL                               MEDICATIONS  (STANDING):  ALBUTerol/ipratropium for Nebulization 3 milliLiter(s) Nebulizer every 6 hours  atorvastatin 10 milliGRAM(s) Oral at bedtime  chlorhexidine 4% Liquid 1 Application(s) Topical <User Schedule>  dextrose 5%. 1000 milliLiter(s) (100 mL/Hr) IV Continuous <Continuous>  heparin  Injectable 5000 Unit(s) SubCutaneous every 12 hours  nitroglycerin    Patch 0.1 mG/Hr(s) 1 patch Transdermal daily  potassium chloride  20 mEq/100 mL IVPB 20 milliEquivalent(s) IV Intermittent every 2 hours

## 2019-07-05 LAB
ANION GAP SERPL CALC-SCNC: 6 MMOL/L — LOW (ref 7–14)
BASE EXCESS BLDA CALC-SCNC: 6.6 MMOL/L — HIGH (ref -2–2)
BUN SERPL-MCNC: <3 MG/DL — LOW (ref 10–20)
CALCIUM SERPL-MCNC: 7.1 MG/DL — LOW (ref 8.5–10.1)
CHLORIDE SERPL-SCNC: 112 MMOL/L — HIGH (ref 98–110)
CO2 SERPL-SCNC: 32 MMOL/L — SIGNIFICANT CHANGE UP (ref 17–32)
CREAT SERPL-MCNC: 0.5 MG/DL — LOW (ref 0.7–1.5)
GAS PNL BLDA: SIGNIFICANT CHANGE UP
GLUCOSE BLDC GLUCOMTR-MCNC: 86 MG/DL — SIGNIFICANT CHANGE UP (ref 70–99)
GLUCOSE BLDC GLUCOMTR-MCNC: 90 MG/DL — SIGNIFICANT CHANGE UP (ref 70–99)
GLUCOSE BLDC GLUCOMTR-MCNC: 94 MG/DL — SIGNIFICANT CHANGE UP (ref 70–99)
GLUCOSE BLDC GLUCOMTR-MCNC: 95 MG/DL — SIGNIFICANT CHANGE UP (ref 70–99)
GLUCOSE SERPL-MCNC: 90 MG/DL — SIGNIFICANT CHANGE UP (ref 70–99)
HCO3 BLDA-SCNC: 33 MMOL/L — HIGH (ref 23–27)
HCT VFR BLD CALC: 35.8 % — LOW (ref 37–47)
HGB BLD-MCNC: 11.1 G/DL — LOW (ref 12–16)
HOROWITZ INDEX BLDA+IHG-RTO: 36 — SIGNIFICANT CHANGE UP
MAGNESIUM SERPL-MCNC: 1.8 MG/DL — SIGNIFICANT CHANGE UP (ref 1.8–2.4)
MCHC RBC-ENTMCNC: 29.6 PG — SIGNIFICANT CHANGE UP (ref 27–31)
MCHC RBC-ENTMCNC: 31 G/DL — LOW (ref 32–37)
MCV RBC AUTO: 95.5 FL — SIGNIFICANT CHANGE UP (ref 81–99)
NRBC # BLD: 0 /100 WBCS — SIGNIFICANT CHANGE UP (ref 0–0)
PCO2 BLDA: 57 MMHG — HIGH (ref 38–42)
PH BLDA: 7.38 — SIGNIFICANT CHANGE UP (ref 7.38–7.42)
PLATELET # BLD AUTO: 194 K/UL — SIGNIFICANT CHANGE UP (ref 130–400)
PO2 BLDA: 75 MMHG — LOW (ref 78–95)
POTASSIUM SERPL-MCNC: 4.1 MMOL/L — SIGNIFICANT CHANGE UP (ref 3.5–5)
POTASSIUM SERPL-SCNC: 4.1 MMOL/L — SIGNIFICANT CHANGE UP (ref 3.5–5)
RBC # BLD: 3.75 M/UL — LOW (ref 4.2–5.4)
RBC # FLD: 14.7 % — HIGH (ref 11.5–14.5)
SAO2 % BLDA: 96 % — SIGNIFICANT CHANGE UP (ref 94–98)
SODIUM SERPL-SCNC: 150 MMOL/L — HIGH (ref 135–146)
WBC # BLD: 4.65 K/UL — LOW (ref 4.8–10.8)
WBC # FLD AUTO: 4.65 K/UL — LOW (ref 4.8–10.8)

## 2019-07-05 PROCEDURE — 99233 SBSQ HOSP IP/OBS HIGH 50: CPT

## 2019-07-05 RX ADMIN — HEPARIN SODIUM 5000 UNIT(S): 5000 INJECTION INTRAVENOUS; SUBCUTANEOUS at 17:08

## 2019-07-05 RX ADMIN — HEPARIN SODIUM 5000 UNIT(S): 5000 INJECTION INTRAVENOUS; SUBCUTANEOUS at 05:25

## 2019-07-05 RX ADMIN — Medication 3 MILLILITER(S): at 14:25

## 2019-07-05 RX ADMIN — Medication 1 PATCH: at 19:00

## 2019-07-05 RX ADMIN — Medication 1 PATCH: at 11:28

## 2019-07-05 RX ADMIN — SODIUM CHLORIDE 100 MILLILITER(S): 9 INJECTION, SOLUTION INTRAVENOUS at 21:40

## 2019-07-05 RX ADMIN — CHLORHEXIDINE GLUCONATE 1 APPLICATION(S): 213 SOLUTION TOPICAL at 05:24

## 2019-07-05 RX ADMIN — Medication 3 MILLILITER(S): at 07:42

## 2019-07-05 RX ADMIN — Medication 3 MILLILITER(S): at 19:45

## 2019-07-05 RX ADMIN — Medication 1 PATCH: at 23:29

## 2019-07-05 NOTE — PHYSICAL THERAPY INITIAL EVALUATION ADULT - SPECIFY REASON(S)
PT Initial Evaluation attempted. As per AM rounds, pt refused BIPAP earlier however pt now encountered with +bipap.

## 2019-07-05 NOTE — PROGRESS NOTE ADULT - SUBJECTIVE AND OBJECTIVE BOX
HPI  Patient is a 86y old Female who presents with a chief complaint of Fever since yesterday (04 Jul 2019 14:58)    Currently admitted to medicine with the primary diagnosis of Pneumonia     Today is hospital day 10d.     INTERVAL HPI / OVERNIGHT EVENTS:  Patient was examined and seen at bedside. This morning she is sleeping comfortably in bed on 7L of nasal cannula. Was successfully weaned off BIPAP. Arousable but non-verbal, says "huh" to every question and nodded her head non-specifically. No acute overnight events. Has a vázquez.    PAST MEDICAL & SURGICAL HISTORY  HLD (hyperlipidemia)  Diastolic dysfunction  HTN (hypertension)  History of cholecystectomy  H/O bilateral hip replacements: Right Hip ORIF on Xray    ALLERGIES  No Known Allergies    MEDICATIONS  STANDING MEDICATIONS  ALBUTerol/ipratropium for Nebulization 3 milliLiter(s) Nebulizer every 6 hours  atorvastatin 10 milliGRAM(s) Oral at bedtime  chlorhexidine 4% Liquid 1 Application(s) Topical <User Schedule>  dextrose 5%. 1000 milliLiter(s) IV Continuous <Continuous>  heparin  Injectable 5000 Unit(s) SubCutaneous every 12 hours  nitroglycerin    Patch 0.1 mG/Hr(s) 1 patch Transdermal daily    PRN MEDICATIONS  acetaminophen   Tablet .. 650 milliGRAM(s) Oral every 6 hours PRN  dextrose 50% Injectable 50 milliLiter(s) IV Push once PRN    VITALS:  T(F): 97.1  HR: 76  BP: 155/70  RR: 17  SpO2: --    PHYSICAL EXAM  GEN: NAD, Resting comfortably in bed with nasal cannula  PULM: Clear to auscultation bilaterally, No wheezes  CVS: Regular rate and rhythm, S1-S2, systolic murmur appreciated in left upper border.  ABD: Soft, non-tender, non-distended, no guarding  EXT: No edema  NEURO: Non-verbal, no focal deficits    LABS                        11.1   4.65  )-----------( 194      ( 05 Jul 2019 05:36 )             35.8     07-05    150<H>  |  112<H>  |  <3<L>  ----------------------------<  90  4.1   |  32  |  0.5<L>    Ca    7.1<L>      05 Jul 2019 05:36  Phos  1.8     07-04  Mg     1.8     07-05    TPro  4.3<L>  /  Alb  2.5<L>  /  TBili  0.3  /  DBili  x   /  AST  15  /  ALT  12  /  AlkPhos  58  07-04    ABG - ( 05 Jul 2019 08:48 )  pH, Arterial: 7.38  pH, Blood: x     /  pCO2: 57    /  pO2: 75    / HCO3: 33    / Base Excess: 6.6   /  SaO2: 96

## 2019-07-05 NOTE — PROGRESS NOTE ADULT - ASSESSMENT
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  -monitor o2 sat  -palliative care input apprecaited     - Hypernatremia- with dehydration state  -na slightly higher today 150  -fluid restriction  -monitor bmp daily  -cont ivf D5w @100 ml/hr       -Acute Urinary retention- seen on renal/bladder US- -good urine output--monitor i/o  -attempt tov    -Recently tx. pneumonia- pt. completed tx., CXR- persistent left basilar opacity- no new pneumonia   - underlying neoplasm can not be r/o, patient will need outpatient CT chest to be repeated in 1.5 months to assess left lung opacitiy.    diet: speech eval--dysphagia 2 diet--nutrition following- consider calorie count (was npo previously 2/2 bipap)    #GI and DVT proph.   DNR/DNI    overall poor prognosis    #Progress Note Handoff  Disposition:Unknown at this time

## 2019-07-05 NOTE — SWALLOW BEDSIDE ASSESSMENT ADULT - SWALLOW EVAL: DIAGNOSIS
No PO trials presented on this date d/t pt wearing bipap upon SLP arrival. No reports of difficulty w/ previously recommended diet.

## 2019-07-05 NOTE — PHYSICAL THERAPY INITIAL EVALUATION ADULT - GENERAL OBSERVATIONS, REHAB EVAL
Spoke with SLIM Presley who reports pt needs to remain on bipap 2* desaturation. Will continue to hold b/s PT, to f/u when appropriate.

## 2019-07-05 NOTE — CHART NOTE - NSCHARTNOTEFT_GEN_A_CORE
Registered Dietitian Follow-Up     Patient Profile Reviewed                           Yes [x]   No []     Nutrition History Previously Obtained        Yes []  No x[]  Spoke w/ patient's family member. She is a picky eater and never finished her plate. She likes meats. NKFA. #. Per family, pt's PCP says her weight is fine. No nutrition supplements PTA. She says ensure pudding would be the best supplement to trial.      Pertinent Subjective Information: 1:1 feed Spoke w/ PCA. She is a difficult eater. She has a few bites and says she is done.      Pertinent Medical Interventions: GOC - cont continue ongoing medical management. Family is opting for DNR/DNI. Hypercapnic Encephalopathy. Was successfully weaned off BIPAP. Hypernatremia - resolved. Hypoglycemia? - PO Challenge by speech pathology daily       Diet order: Dys 2 mech soft, DASH/TLC     Anthropometrics:  - Ht.  - Wt.  - %wt change  - BMI 19.8.   IBW: 100#+/-10%      Pertinent Lab Data: (7/5) Na 150  Cl 112      Pertinent Meds: heparin, d5w 100ml/h, albuterol, lipitor.     Physical Findings:  - Appearance:  - GI function: LBM 7/3  - Tubes:  - Oral/Mouth cavity: Soft diet per SLP   - Skin: 2+ edema R arm, nose wound from BIPAP noted.     Nutrition Requirements  Weight Used: needs cont from RD assessment 7/2      estimated energy needs: 990-1075kcal (MSJx1.2-1.3AF)  estimated protein needs: 46-55g (1.0-1.2g/kg)  estimated fluid needs: 1ml/kcal or per LIP    Nutrient Intake: not meeting needs         [] Previous Nutrition Diagnosis: inadequate protein-energy intake.             [x] Ongoing          [] Resolved    [] No active nutrition diagnosis identified at this time     NEW Nutrition Diagnostic #1  Problem: Unintentional wt loss   Etiology: decreased appetite/picky eater   Statement: Per family, her UBW is 108# (current 101#, 7.5% weight loss)        Nutrition Intervention meal/snack, med food supplement      Goal/Expected Outcome: Pt to consume >50% of meal tray and at least maintain CBW in 1-2 lbs in 4 days      Indicator/Monitoring: RD to monitor energy intake, diet order, body comp, NFPF electrolyte profile    Recommendation: Cont diet texture per SLP, DASH modifier and order ensure pudding TID Registered Dietitian Follow-Up     Patient Profile Reviewed                           Yes [x]   No []     Nutrition History Previously Obtained        Yes []  No x[]  Spoke w/ patient's family member. She is a picky eater and never finished her plate. She likes meats. NKFA. #. Per family, pt's PCP says her weight is fine. No nutrition supplements PTA. She says ensure pudding would be the best supplement to trial.      Pertinent Subjective Information: 1:1 feed Spoke w/ PCA. She is a difficult eater. She has a few bites and says she is done.      Pertinent Medical Interventions: GOC - cont continue ongoing medical management. Family is opting for DNR/DNI. Hypercapnic Encephalopathy. Was successfully weaned off BIPAP. Hypernatremia - resolved. Hypoglycemia? - PO Challenge by speech pathology daily       Diet order: Dys 2 mech soft, DASH/TLC     Anthropometrics:  - Ht.  - Wt.  - %wt change  - BMI 19.8.   IBW: 100#+/-10%      Pertinent Lab Data: (7/5) Na 150  Cl 112      Pertinent Meds: heparin, d5w 100ml/h, albuterol, lipitor.     Physical Findings:  - Appearance:  - GI function: LBM 7/3  - Tubes:  - Oral/Mouth cavity: Soft diet per SLP   - Skin: 2+ edema R arm, nose wound from BIPAP noted.     Nutrition Requirements  Weight Used: needs cont from RD assessment 7/2      estimated energy needs: 990-1075kcal (MSJx1.2-1.3AF)  estimated protein needs: 46-55g (1.0-1.2g/kg)  estimated fluid needs: 1ml/kcal or per LIP    Nutrient Intake: not meeting needs         [] Previous Nutrition Diagnosis: inadequate protein-energy intake.             [x] Ongoing          [] Resolved    [] No active nutrition diagnosis identified at this time     NEW Nutrition Diagnostic #1  Problem: Unintentional wt loss   Etiology: decreased appetite/picky eater   Statement: Per family, her UBW is 108# (current 101#, 7.5% weight loss)        Nutrition Intervention meal/snack, med food supplement      Goal/Expected Outcome: Pt to consume >50% of meal tray and at least maintain CBW in 1-2 lbs in 4 days      Indicator/Monitoring: RD to monitor energy intake, diet order, body comp, NFPF electrolyte profile    Recommendation: Cont diet texture per SLP, DASH modifier and order ensure pudding TID. Would consider ordering 3day calorie count to assess po intake.

## 2019-07-05 NOTE — PROGRESS NOTE ADULT - SUBJECTIVE AND OBJECTIVE BOX
Patient is a 86y old  Female who presents with a chief complaint of Fever since yesterday (03 Jul 2019 15:40)    HPI:  86 y/f with PMH of DLD, HTN, recent hospitalization for pneumonia presents here from home with reported history of fever since yesterday. She is unable to provide any history, no family at bedside, couldnt reach Daughter Waleska Ocasio at this time inspite of trying multiple times, history based on ED chart. She is reported to have she was having fever x 1 day, she also seemed to be confused this AM, was given Bactrim which was left over from her prior infection but she threw up after taking it.   In ED she was hypotensive to SBP 97, with elevated lactate. She was given 3400 ml of LR, after which her BP improved and lactate decreased. (25 Jun 2019 15:06)    PAST MEDICAL & SURGICAL HISTORY:  HLD (hyperlipidemia)  Diastolic dysfunction  HTN (hypertension)  History of cholecystectomy  H/O bilateral hip replacements: Right Hip ORIF on Xray    Patient seen and examined bedside independently on morning rounds,  chart reviewed and d/w the medicine resident and on interdisciplinary rounds    no overnight events--- tolerating o2 via nc-agrees for intermittent bipap-  DNR/DNI---    PE:  GEN-NAD, AAOx3, elderly cachectic  PULM-  fair air entry with fine expirtory crackles  CVS- +s1/s2 RRR no murmurs  GI- soft NT ND +bs, no rebound, no guarding         Labs:                        11.1   4.65  )-----------( 194      ( 05 Jul 2019 05:36 )             35.8     CBC Full  -  ( 05 Jul 2019 05:36 )  WBC Count : 4.65 K/uL  RBC Count : 3.75 M/uL  Hemoglobin : 11.1 g/dL  Hematocrit : 35.8 %  Platelet Count - Automated : 194 K/uL  Mean Cell Volume : 95.5 fL  Mean Cell Hemoglobin : 29.6 pg  Mean Cell Hemoglobin Concentration : 31.0 g/dL  Auto Neutrophil # : x  Auto Lymphocyte # : x  Auto Monocyte # : x  Auto Eosinophil # : x  Auto Basophil # : x  Auto Neutrophil % : x  Auto Lymphocyte % : x  Auto Monocyte % : x  Auto Eosinophil % : x  Auto Basophil % : x      07-05    150<H>  |  112<H>  |  <3<L>  ----------------------------<  90  4.1   |  32  |  0.5<L>    Ca    7.1<L>      05 Jul 2019 05:36  Phos  1.8     07-04  Mg     1.8     07-05    TPro  4.3<L>  /  Alb  2.5<L>  /  TBili  0.3  /  DBili  x   /  AST  15  /  ALT  12  /  AlkPhos  58  07-04      Microbiology:      Vital Signs Last 24 Hrs  T(C): 37.1 (05 Jul 2019 12:02), Max: 37.1 (05 Jul 2019 12:02)  T(F): 98.7 (05 Jul 2019 12:02), Max: 98.7 (05 Jul 2019 12:02)  HR: 58 (05 Jul 2019 12:02) (58 - 76)  BP: 128/54 (05 Jul 2019 12:02) (127/48 - 155/70)  BP(mean): --  RR: 18 (05 Jul 2019 12:02) (17 - 18)  SpO2: --    I&O's Summary    04 Jul 2019 07:01  -  05 Jul 2019 07:00  --------------------------------------------------------  IN: 0 mL / OUT: 3050 mL / NET: -3050 mL    05 Jul 2019 07:01  -  05 Jul 2019 16:30  --------------------------------------------------------  IN: 0 mL / OUT: 700 mL / NET: -700 mL

## 2019-07-05 NOTE — PROGRESS NOTE ADULT - ASSESSMENT
Assessment:  85 y/o F w/ PMH of DLD, HTN, CHF, and recent hospitalization of PNA presented from home w/ fever and AMS. Patient is non-verbal.    #Hypercapnic Encephalopathy- improves w/ BIPAP  - Successfully weaned off BIPAP on 7/3. Currently satting mid-high 90's on cannula. F/U ABG's.and speech pathoogy for PO challenge  - Metabolic encephalopathy less likely d/t - BCX/UCX negative- No leukocytosis  - Ammonia elevated (59 on 6/27)   - EEG showed no seizure activity  - Folate/B12/TSH all WNL  - MR brain revealed chronic microvascular changes and chronic lacunar infarctions  - Monitor pulse ox  - CXR shows basilar effusions  - Pulmonary consult recommended weaning off BIPAP, albuterol nebulizers, IV steroids, outpatient PFT's, and GOC discussion  - Palliative care consulted  - Pulmonology is following the case  - Continue BIPAP PRN     #Hypernatremia - resolved   - patient was on continuous IVF, was changed to D5 in water 100ml/hr  - Sodium, Serum: 150 mmol/L (07.05.19)    #Hypoglycemia?  - PO Challenge by speech pathology daily   - IVF D5NS@100ml/hr changed to D5 in water due to previous hypernatremia  - POCT reveals low/lower limit of normal blood glucose levels, even with D50 infusion  - Underlying neoplasm (i.e. insulinoma)?  - Infuse dextrose if <80   - Cortisol AM, Serum: 11.8 ug/dL (06.27.19 @ 21:16) WNL    #Acute urinary retention  - seen on renal/bladder US.  - Funez inserted  - monitor I&O    #KATINA  - Urinary retention related, resolved    Diet:  NPO due to high risk of aspiration    Goals of care:  Discussed with daughter. Patient has not been tolerating PO feeds for several days. We will try to wean her off the BIPAP so that a PO challenge can be done. If that fails, then must decide between a PEG tube or a palliative care route. Daughter stated that she understood and will discuss these options with her brother.

## 2019-07-06 LAB
ANION GAP SERPL CALC-SCNC: 6 MMOL/L — LOW (ref 7–14)
BASOPHILS # BLD AUTO: 0 K/UL — SIGNIFICANT CHANGE UP (ref 0–0.2)
BASOPHILS NFR BLD AUTO: 0 % — SIGNIFICANT CHANGE UP (ref 0–1)
BUN SERPL-MCNC: 4 MG/DL — LOW (ref 10–20)
CALCIUM SERPL-MCNC: 7.1 MG/DL — LOW (ref 8.5–10.1)
CHLORIDE SERPL-SCNC: 109 MMOL/L — SIGNIFICANT CHANGE UP (ref 98–110)
CO2 SERPL-SCNC: 32 MMOL/L — SIGNIFICANT CHANGE UP (ref 17–32)
CREAT SERPL-MCNC: 0.6 MG/DL — LOW (ref 0.7–1.5)
EOSINOPHIL # BLD AUTO: 0 K/UL — SIGNIFICANT CHANGE UP (ref 0–0.7)
EOSINOPHIL NFR BLD AUTO: 0 % — SIGNIFICANT CHANGE UP (ref 0–8)
GLUCOSE BLDC GLUCOMTR-MCNC: 106 MG/DL — HIGH (ref 70–99)
GLUCOSE BLDC GLUCOMTR-MCNC: 85 MG/DL — SIGNIFICANT CHANGE UP (ref 70–99)
GLUCOSE BLDC GLUCOMTR-MCNC: 95 MG/DL — SIGNIFICANT CHANGE UP (ref 70–99)
GLUCOSE BLDC GLUCOMTR-MCNC: 97 MG/DL — SIGNIFICANT CHANGE UP (ref 70–99)
GLUCOSE SERPL-MCNC: 98 MG/DL — SIGNIFICANT CHANGE UP (ref 70–99)
HCT VFR BLD CALC: 37.3 % — SIGNIFICANT CHANGE UP (ref 37–47)
HGB BLD-MCNC: 11.7 G/DL — LOW (ref 12–16)
IMM GRANULOCYTES NFR BLD AUTO: 0.7 % — HIGH (ref 0.1–0.3)
LYMPHOCYTES # BLD AUTO: 1.26 K/UL — SIGNIFICANT CHANGE UP (ref 1.2–3.4)
LYMPHOCYTES # BLD AUTO: 23.5 % — SIGNIFICANT CHANGE UP (ref 20.5–51.1)
MAGNESIUM SERPL-MCNC: 1.7 MG/DL — LOW (ref 1.8–2.4)
MCHC RBC-ENTMCNC: 29.4 PG — SIGNIFICANT CHANGE UP (ref 27–31)
MCHC RBC-ENTMCNC: 31.4 G/DL — LOW (ref 32–37)
MCV RBC AUTO: 93.7 FL — SIGNIFICANT CHANGE UP (ref 81–99)
MONOCYTES # BLD AUTO: 0.33 K/UL — SIGNIFICANT CHANGE UP (ref 0.1–0.6)
MONOCYTES NFR BLD AUTO: 6.2 % — SIGNIFICANT CHANGE UP (ref 1.7–9.3)
NEUTROPHILS # BLD AUTO: 3.73 K/UL — SIGNIFICANT CHANGE UP (ref 1.4–6.5)
NEUTROPHILS NFR BLD AUTO: 69.6 % — SIGNIFICANT CHANGE UP (ref 42.2–75.2)
NRBC # BLD: 0 /100 WBCS — SIGNIFICANT CHANGE UP (ref 0–0)
PHOSPHATE SERPL-MCNC: 1.5 MG/DL — LOW (ref 2.1–4.9)
PLATELET # BLD AUTO: 183 K/UL — SIGNIFICANT CHANGE UP (ref 130–400)
POTASSIUM SERPL-MCNC: 3.6 MMOL/L — SIGNIFICANT CHANGE UP (ref 3.5–5)
POTASSIUM SERPL-SCNC: 3.6 MMOL/L — SIGNIFICANT CHANGE UP (ref 3.5–5)
RBC # BLD: 3.98 M/UL — LOW (ref 4.2–5.4)
RBC # FLD: 14.6 % — HIGH (ref 11.5–14.5)
SODIUM SERPL-SCNC: 147 MMOL/L — HIGH (ref 135–146)
WBC # BLD: 5.36 K/UL — SIGNIFICANT CHANGE UP (ref 4.8–10.8)
WBC # FLD AUTO: 5.36 K/UL — SIGNIFICANT CHANGE UP (ref 4.8–10.8)

## 2019-07-06 PROCEDURE — 99232 SBSQ HOSP IP/OBS MODERATE 35: CPT

## 2019-07-06 RX ORDER — MAGNESIUM SULFATE 500 MG/ML
2 VIAL (ML) INJECTION ONCE
Refills: 0 | Status: COMPLETED | OUTPATIENT
Start: 2019-07-06 | End: 2019-07-06

## 2019-07-06 RX ORDER — HYDRALAZINE HCL 50 MG
10 TABLET ORAL DAILY
Refills: 0 | Status: DISCONTINUED | OUTPATIENT
Start: 2019-07-06 | End: 2019-07-09

## 2019-07-06 RX ORDER — SODIUM,POTASSIUM PHOSPHATES 278-250MG
1 POWDER IN PACKET (EA) ORAL THREE TIMES A DAY
Refills: 0 | Status: DISCONTINUED | OUTPATIENT
Start: 2019-07-06 | End: 2019-07-10

## 2019-07-06 RX ADMIN — Medication 1 PATCH: at 13:14

## 2019-07-06 RX ADMIN — Medication 10 MILLIGRAM(S): at 21:07

## 2019-07-06 RX ADMIN — HEPARIN SODIUM 5000 UNIT(S): 5000 INJECTION INTRAVENOUS; SUBCUTANEOUS at 17:18

## 2019-07-06 RX ADMIN — Medication 1 PACKET(S): at 13:14

## 2019-07-06 RX ADMIN — SODIUM CHLORIDE 100 MILLILITER(S): 9 INJECTION, SOLUTION INTRAVENOUS at 08:16

## 2019-07-06 RX ADMIN — CHLORHEXIDINE GLUCONATE 1 APPLICATION(S): 213 SOLUTION TOPICAL at 05:59

## 2019-07-06 RX ADMIN — ATORVASTATIN CALCIUM 10 MILLIGRAM(S): 80 TABLET, FILM COATED ORAL at 21:07

## 2019-07-06 RX ADMIN — HEPARIN SODIUM 5000 UNIT(S): 5000 INJECTION INTRAVENOUS; SUBCUTANEOUS at 05:59

## 2019-07-06 RX ADMIN — Medication 50 GRAM(S): at 13:13

## 2019-07-06 RX ADMIN — Medication 1 PATCH: at 20:37

## 2019-07-06 RX ADMIN — Medication 3 MILLILITER(S): at 13:07

## 2019-07-06 RX ADMIN — Medication 1 PACKET(S): at 21:07

## 2019-07-06 RX ADMIN — Medication 3 MILLILITER(S): at 07:42

## 2019-07-06 NOTE — PROGRESS NOTE ADULT - ASSESSMENT
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  -monitor o2 sat  -palliative care input apprecaited     - Hypernatremia- with dehydration state-improving  -na 147 today  -fluid restriction  -monitor bmp daily  -cont ivf D5w @100 ml/hr       -Recently tx. pneumonia- pt. completed tx., CXR- persistent left basilar opacity- no new pneumonia   - underlying neoplasm can not be r/o, patient will need outpatient CT chest to be repeated in 1.5 months to assess left lung opacitiy.    diet: speech eval--dysphagia 2 diet--nutrition following- consider calorie count (was npo previously 2/2 bipap)    #GI and DVT proph.   DNR/DNI    overall poor prognosis    #Progress Note Handoff  Disposition:Unknown at this time--anticipate STR placement this week---will need PT reeval

## 2019-07-06 NOTE — PROGRESS NOTE ADULT - SUBJECTIVE AND OBJECTIVE BOX
Patient is a 86y old  Female who presents with a chief complaint of Fever since yesterday (03 Jul 2019 15:40)    HPI:  86 y/f with PMH of DLD, HTN, recent hospitalization for pneumonia presents here from home with reported history of fever since yesterday. She is unable to provide any history, no family at bedside, couldnt reach Daughter Waleska Ocasio at this time inspite of trying multiple times, history based on ED chart. She is reported to have she was having fever x 1 day, she also seemed to be confused this AM, was given Bactrim which was left over from her prior infection but she threw up after taking it.   In ED she was hypotensive to SBP 97, with elevated lactate. She was given 3400 ml of LR, after which her BP improved and lactate decreased. (25 Jun 2019 15:06)    PAST MEDICAL & SURGICAL HISTORY:  HLD (hyperlipidemia)  Diastolic dysfunction  HTN (hypertension)  History of cholecystectomy  H/O bilateral hip replacements: Right Hip ORIF on Xray    Patient seen and examined bedside independently on morning rounds,  chart reviewed and d/w the medicine resident and on interdisciplinary rounds    no overnight events--- tolerating o2 via nc-agrees for intermittent bipap-  DNR/DNI---    PE:  GEN-NAD, AAOx3, elderly cachectic  PULM-  fair air entry with fine expirtory crackles  CVS- +s1/s2 RRR no murmurs  GI- soft NT ND +bs, no rebound, no guarding        Labs:                        11.7   5.36  )-----------( 183      ( 06 Jul 2019 07:07 )             37.3     CBC Full  -  ( 06 Jul 2019 07:07 )  WBC Count : 5.36 K/uL  RBC Count : 3.98 M/uL  Hemoglobin : 11.7 g/dL  Hematocrit : 37.3 %  Platelet Count - Automated : 183 K/uL  Mean Cell Volume : 93.7 fL  Mean Cell Hemoglobin : 29.4 pg  Mean Cell Hemoglobin Concentration : 31.4 g/dL  Auto Neutrophil # : 3.73 K/uL  Auto Lymphocyte # : 1.26 K/uL  Auto Monocyte # : 0.33 K/uL  Auto Eosinophil # : 0.00 K/uL  Auto Basophil # : 0.00 K/uL  Auto Neutrophil % : 69.6 %  Auto Lymphocyte % : 23.5 %  Auto Monocyte % : 6.2 %  Auto Eosinophil % : 0.0 %  Auto Basophil % : 0.0 %      07-06    147<H>  |  109  |  4<L>  ----------------------------<  98  3.6   |  32  |  0.6<L>    Ca    7.1<L>      06 Jul 2019 07:07  Phos  1.5     07-06  Mg     1.7     07-06        Microbiology:      Vital Signs Last 24 Hrs  T(C): 36.1 (06 Jul 2019 14:11), Max: 36.8 (05 Jul 2019 20:42)  T(F): 97 (06 Jul 2019 14:11), Max: 98.3 (05 Jul 2019 20:42)  HR: 72 (06 Jul 2019 14:11) (61 - 72)  BP: 171/93 (06 Jul 2019 14:11) (123/63 - 171/93)  BP(mean): --  RR: 18 (06 Jul 2019 14:11) (18 - 20)  SpO2: 98% (05 Jul 2019 20:00) (98% - 98%)    I&O's Summary    05 Jul 2019 07:01  -  06 Jul 2019 07:00  --------------------------------------------------------  IN: 0 mL / OUT: 700 mL / NET: -700 mL    06 Jul 2019 07:01  -  06 Jul 2019 16:03  --------------------------------------------------------  IN: 1240 mL / OUT: 0 mL / NET: 1240 mL

## 2019-07-07 LAB
ALBUMIN SERPL ELPH-MCNC: 2.7 G/DL — LOW (ref 3.5–5.2)
ALP SERPL-CCNC: 71 U/L — SIGNIFICANT CHANGE UP (ref 30–115)
ALT FLD-CCNC: 10 U/L — SIGNIFICANT CHANGE UP (ref 0–41)
ANION GAP SERPL CALC-SCNC: 7 MMOL/L — SIGNIFICANT CHANGE UP (ref 7–14)
APTT BLD: 36.8 SEC — SIGNIFICANT CHANGE UP (ref 27–39.2)
AST SERPL-CCNC: 14 U/L — SIGNIFICANT CHANGE UP (ref 0–41)
BASOPHILS # BLD AUTO: 0.01 K/UL — SIGNIFICANT CHANGE UP (ref 0–0.2)
BASOPHILS NFR BLD AUTO: 0.2 % — SIGNIFICANT CHANGE UP (ref 0–1)
BILIRUB SERPL-MCNC: 0.5 MG/DL — SIGNIFICANT CHANGE UP (ref 0.2–1.2)
BLD GP AB SCN SERPL QL: SIGNIFICANT CHANGE UP
BUN SERPL-MCNC: 5 MG/DL — LOW (ref 10–20)
CALCIUM SERPL-MCNC: 7.1 MG/DL — LOW (ref 8.5–10.1)
CHLORIDE SERPL-SCNC: 107 MMOL/L — SIGNIFICANT CHANGE UP (ref 98–110)
CO2 SERPL-SCNC: 32 MMOL/L — SIGNIFICANT CHANGE UP (ref 17–32)
CREAT SERPL-MCNC: 0.6 MG/DL — LOW (ref 0.7–1.5)
EOSINOPHIL # BLD AUTO: 0 K/UL — SIGNIFICANT CHANGE UP (ref 0–0.7)
EOSINOPHIL NFR BLD AUTO: 0 % — SIGNIFICANT CHANGE UP (ref 0–8)
GLUCOSE BLDC GLUCOMTR-MCNC: 101 MG/DL — HIGH (ref 70–99)
GLUCOSE BLDC GLUCOMTR-MCNC: 113 MG/DL — HIGH (ref 70–99)
GLUCOSE BLDC GLUCOMTR-MCNC: 95 MG/DL — SIGNIFICANT CHANGE UP (ref 70–99)
GLUCOSE BLDC GLUCOMTR-MCNC: 98 MG/DL — SIGNIFICANT CHANGE UP (ref 70–99)
GLUCOSE SERPL-MCNC: 84 MG/DL — SIGNIFICANT CHANGE UP (ref 70–99)
HCT VFR BLD CALC: 38.3 % — SIGNIFICANT CHANGE UP (ref 37–47)
HGB BLD-MCNC: 11.9 G/DL — LOW (ref 12–16)
IMM GRANULOCYTES NFR BLD AUTO: 0.7 % — HIGH (ref 0.1–0.3)
INR BLD: 1 RATIO — SIGNIFICANT CHANGE UP (ref 0.65–1.3)
LYMPHOCYTES # BLD AUTO: 1.46 K/UL — SIGNIFICANT CHANGE UP (ref 1.2–3.4)
LYMPHOCYTES # BLD AUTO: 24.1 % — SIGNIFICANT CHANGE UP (ref 20.5–51.1)
MAGNESIUM SERPL-MCNC: 2.2 MG/DL — SIGNIFICANT CHANGE UP (ref 1.8–2.4)
MCHC RBC-ENTMCNC: 29.5 PG — SIGNIFICANT CHANGE UP (ref 27–31)
MCHC RBC-ENTMCNC: 31.1 G/DL — LOW (ref 32–37)
MCV RBC AUTO: 94.8 FL — SIGNIFICANT CHANGE UP (ref 81–99)
MONOCYTES # BLD AUTO: 0.41 K/UL — SIGNIFICANT CHANGE UP (ref 0.1–0.6)
MONOCYTES NFR BLD AUTO: 6.8 % — SIGNIFICANT CHANGE UP (ref 1.7–9.3)
NEUTROPHILS # BLD AUTO: 4.15 K/UL — SIGNIFICANT CHANGE UP (ref 1.4–6.5)
NEUTROPHILS NFR BLD AUTO: 68.2 % — SIGNIFICANT CHANGE UP (ref 42.2–75.2)
NRBC # BLD: 0 /100 WBCS — SIGNIFICANT CHANGE UP (ref 0–0)
PHOSPHATE SERPL-MCNC: 2.2 MG/DL — SIGNIFICANT CHANGE UP (ref 2.1–4.9)
PLATELET # BLD AUTO: 177 K/UL — SIGNIFICANT CHANGE UP (ref 130–400)
POTASSIUM SERPL-MCNC: 3.9 MMOL/L — SIGNIFICANT CHANGE UP (ref 3.5–5)
POTASSIUM SERPL-SCNC: 3.9 MMOL/L — SIGNIFICANT CHANGE UP (ref 3.5–5)
PROT SERPL-MCNC: 4.8 G/DL — LOW (ref 6–8)
PROTHROM AB SERPL-ACNC: 11.5 SEC — SIGNIFICANT CHANGE UP (ref 9.95–12.87)
RBC # BLD: 4.04 M/UL — LOW (ref 4.2–5.4)
RBC # FLD: 14.7 % — HIGH (ref 11.5–14.5)
SODIUM SERPL-SCNC: 146 MMOL/L — SIGNIFICANT CHANGE UP (ref 135–146)
WBC # BLD: 6.07 K/UL — SIGNIFICANT CHANGE UP (ref 4.8–10.8)
WBC # FLD AUTO: 6.07 K/UL — SIGNIFICANT CHANGE UP (ref 4.8–10.8)

## 2019-07-07 PROCEDURE — 99232 SBSQ HOSP IP/OBS MODERATE 35: CPT

## 2019-07-07 RX ADMIN — Medication 1 PATCH: at 23:58

## 2019-07-07 RX ADMIN — Medication 1 PACKET(S): at 15:12

## 2019-07-07 RX ADMIN — Medication 3 MILLILITER(S): at 19:36

## 2019-07-07 RX ADMIN — CHLORHEXIDINE GLUCONATE 1 APPLICATION(S): 213 SOLUTION TOPICAL at 06:36

## 2019-07-07 RX ADMIN — Medication 1 PATCH: at 00:26

## 2019-07-07 RX ADMIN — SODIUM CHLORIDE 100 MILLILITER(S): 9 INJECTION, SOLUTION INTRAVENOUS at 17:04

## 2019-07-07 RX ADMIN — Medication 1 PATCH: at 20:37

## 2019-07-07 RX ADMIN — Medication 1 PACKET(S): at 06:35

## 2019-07-07 RX ADMIN — HEPARIN SODIUM 5000 UNIT(S): 5000 INJECTION INTRAVENOUS; SUBCUTANEOUS at 18:04

## 2019-07-07 RX ADMIN — ATORVASTATIN CALCIUM 10 MILLIGRAM(S): 80 TABLET, FILM COATED ORAL at 21:05

## 2019-07-07 RX ADMIN — HEPARIN SODIUM 5000 UNIT(S): 5000 INJECTION INTRAVENOUS; SUBCUTANEOUS at 06:35

## 2019-07-07 RX ADMIN — Medication 1 PATCH: at 12:01

## 2019-07-07 RX ADMIN — Medication 1 PACKET(S): at 21:05

## 2019-07-07 RX ADMIN — Medication 3 MILLILITER(S): at 13:20

## 2019-07-07 NOTE — PROGRESS NOTE ADULT - SUBJECTIVE AND OBJECTIVE BOX
HPI  Patient is a 86y old Female who presents with a chief complaint of Fever since yesterday (07 Jul 2019 11:44)    Currently admitted to medicine with the primary diagnosis of Pneumonia     Today is hospital day 12d.     INTERVAL HPI / OVERNIGHT EVENTS:  Patient was examined and seen at bedside. This morning she is resting comfortably in bed without any oxygen and reports no new issues or overnight events. The patient was alert, nodded and said "hello". The patients daughter was beside her, and stated that was able to eat some crackers and other soft foods. She was swallowing well and looked much better overall. The patient denied chest pan, shortness of breath, or chest tightness.     ROS: Otherwise unremarkable     PAST MEDICAL & SURGICAL HISTORY  HLD (hyperlipidemia)  Diastolic dysfunction  HTN (hypertension)  History of cholecystectomy  H/O bilateral hip replacements: Right Hip ORIF on Xray    ALLERGIES  No Known Allergies    MEDICATIONS  STANDING MEDICATIONS  ALBUTerol/ipratropium for Nebulization 3 milliLiter(s) Nebulizer every 6 hours  atorvastatin 10 milliGRAM(s) Oral at bedtime  chlorhexidine 4% Liquid 1 Application(s) Topical <User Schedule>  dextrose 5%. 1000 milliLiter(s) IV Continuous <Continuous>  heparin  Injectable 5000 Unit(s) SubCutaneous every 12 hours  hydrALAZINE 10 milliGRAM(s) Oral daily  nitroglycerin    Patch 0.1 mG/Hr(s) 1 patch Transdermal daily  potassium phosphate / sodium phosphate powder 1 Packet(s) Oral three times a day    PRN MEDICATIONS  acetaminophen   Tablet .. 650 milliGRAM(s) Oral every 6 hours PRN  dextrose 50% Injectable 50 milliLiter(s) IV Push once PRN    VITALS:  T(F): 96.9  HR: 95  BP: 156/74  RR: 18  SpO2: 93%    PHYSICAL EXAM  GEN: NAD, Resting comfortably in bed without oxygen  PULM: Clear to auscultation bilaterally, No wheezes  CVS: Regular rate and rhythm, S1-S2, no murmurs  ABD: Soft, non-tender, non-distended, no guarding  EXT: No edema  NEURO: AAOx1, no focal deficits    LABS                        11.9   6.07  )-----------( 177      ( 07 Jul 2019 06:12 )             38.3     07-07    146  |  107  |  5<L>  ----------------------------<  84  3.9   |  32  |  0.6<L>    Ca    7.1<L>      07 Jul 2019 06:12  Phos  2.2     07-07  Mg     2.2     07-07    TPro  4.8<L>  /  Alb  2.7<L>  /  TBili  0.5  /  DBili  x   /  AST  14  /  ALT  10  /  AlkPhos  71  07-07    PT/INR - ( 07 Jul 2019 06:12 )   PT: 11.50 sec;   INR: 1.00 ratio         PTT - ( 07 Jul 2019 06:12 )  PTT:36.8 sec

## 2019-07-07 NOTE — PROGRESS NOTE ADULT - ASSESSMENT
Assessment:  87 y/o F w/ PMH of DLD, HTN, CHF, and recent hospitalization of PNA presented from home w/ fever and AMS. Patient is non-verbal.    #Hypercapnic Encephalopathy- improves w/ BIPAP  - Successfully weaned off BIPAP on 7/3. Currently without oxygen and tolerating PO intake (crackers, soft food, juice).  - Metabolic encephalopathy less likely d/t - BCX/UCX negative- No leukocytosis  - Ammonia elevated (59 on 6/27)   - EEG showed no seizure activity  - Folate/B12/TSH all WNL  - MR brain revealed chronic microvascular changes and chronic lacunar infarctions  - Monitor pulse ox  - CXR shows basilar effusions  - Pulmonary consult recommended weaning off BIPAP, albuterol nebulizers, IV steroids, outpatient PFT's, and GOC discussion  - Palliative care consulted  - Pulmonology is following the case  - Continue BIPAP PRN     #Hypernatremia - resolved   - patient was on continuous IVF, was changed to D5 in water 100ml/hr  - Sodium, Serum: 146mmol/L (07.07.19)     #Hypoglycemia?  - PO Challenge by speech pathology daily   - IVF D5NS@100ml/hr changed to D5 in water due to previous hypernatremia  - POCT reveals low/lower limit of normal blood glucose levels, even with D50 infusion  - Underlying neoplasm (i.e. insulinoma)?  - Infuse dextrose if <80   - Cortisol AM, Serum: 11.8 ug/dL (06.27.19 @ 21:16) WNL    #Acute urinary retention  - seen on renal/bladder US.  - Funez inserted  - monitor I&O    #KATINA  - Urinary retention related, resolved

## 2019-07-07 NOTE — PROGRESS NOTE ADULT - SUBJECTIVE AND OBJECTIVE BOX
Patient is a 86y old  Female who presents with a chief complaint of Fever since yesterday (03 Jul 2019 15:40)    HPI:  86 y/f with PMH of DLD, HTN, recent hospitalization for pneumonia presents here from home with reported history of fever since yesterday. She is unable to provide any history, no family at bedside, couldnt reach Daughter Waleska Ocasio at this time inspite of trying multiple times, history based on ED chart. She is reported to have she was having fever x 1 day, she also seemed to be confused this AM, was given Bactrim which was left over from her prior infection but she threw up after taking it.   In ED she was hypotensive to SBP 97, with elevated lactate. She was given 3400 ml of LR, after which her BP improved and lactate decreased. (25 Jun 2019 15:06)    PAST MEDICAL & SURGICAL HISTORY:  HLD (hyperlipidemia)  Diastolic dysfunction  HTN (hypertension)  History of cholecystectomy  H/O bilateral hip replacements: Right Hip ORIF on Xray    Patient seen and examined bedside independently on morning rounds,  chart reviewed and d/w the medicine resident and on interdisciplinary rounds    no overnight events--- tolerating o2 via nc with intermittent bipap  DNR/DNI---    PE:  GEN-NAD, AAOx3, elderly cachectic, on o2  PULM-  fair air entry with fine expirtory crackles  CVS- +s1/s2 RRR no murmurs  GI- soft NT ND +bs, no rebound, no guarding        Labs:                        11.9   6.07  )-----------( 177      ( 07 Jul 2019 06:12 )             38.3     CBC Full  -  ( 07 Jul 2019 06:12 )  WBC Count : 6.07 K/uL  RBC Count : 4.04 M/uL  Hemoglobin : 11.9 g/dL  Hematocrit : 38.3 %  Platelet Count - Automated : 177 K/uL  Mean Cell Volume : 94.8 fL  Mean Cell Hemoglobin : 29.5 pg  Mean Cell Hemoglobin Concentration : 31.1 g/dL  Auto Neutrophil # : 4.15 K/uL  Auto Lymphocyte # : 1.46 K/uL  Auto Monocyte # : 0.41 K/uL  Auto Eosinophil # : 0.00 K/uL  Auto Basophil # : 0.01 K/uL  Auto Neutrophil % : 68.2 %  Auto Lymphocyte % : 24.1 %  Auto Monocyte % : 6.8 %  Auto Eosinophil % : 0.0 %  Auto Basophil % : 0.2 %      07-07    146  |  107  |  5<L>  ----------------------------<  84  3.9   |  32  |  0.6<L>    Ca    7.1<L>      07 Jul 2019 06:12  Phos  2.2     07-07  Mg     2.2     07-07    TPro  4.8<L>  /  Alb  2.7<L>  /  TBili  0.5  /  DBili  x   /  AST  14  /  ALT  10  /  AlkPhos  71  07-07      Microbiology:      Vital Signs Last 24 Hrs  T(C): 36.1 (07 Jul 2019 05:25), Max: 36.1 (06 Jul 2019 14:11)  T(F): 96.9 (07 Jul 2019 05:25), Max: 97 (06 Jul 2019 14:11)  HR: 95 (07 Jul 2019 07:36) (72 - 95)  BP: 156/74 (07 Jul 2019 09:50) (150/62 - 179/114)  BP(mean): --  RR: 18 (07 Jul 2019 05:25) (18 - 18)  SpO2: 93% (06 Jul 2019 20:21) (93% - 93%)    I&O's Summary    06 Jul 2019 07:01  -  07 Jul 2019 07:00  --------------------------------------------------------  IN: 1360 mL / OUT: 1400 mL / NET: -40 mL

## 2019-07-07 NOTE — PROGRESS NOTE ADULT - ASSESSMENT
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  -monitor o2 sat  -palliative care input apprecaited     - Hypernatremia- with dehydration state-improving  -na 146 today  -fluid restriction  -monitor bmp daily  -cont ivf D5w @100 ml/hr       -Recently tx. pneumonia- pt. completed tx., CXR- persistent left basilar opacity- no new pneumonia   - underlying neoplasm can not be r/o, patient will need outpatient CT chest to be repeated in 1.5 months to assess left lung opacitiy.    diet: speech eval--dysphagia 2 diet--nutrition following    #GI and DVT proph.   DNR/DNI    overall poor prognosis    #Progress Note Handoff  Disposition:Unknown at this time--anticipate STR placement this week---will need PT reeval

## 2019-07-08 LAB
GLUCOSE BLDC GLUCOMTR-MCNC: 101 MG/DL — HIGH (ref 70–99)
GLUCOSE BLDC GLUCOMTR-MCNC: 116 MG/DL — HIGH (ref 70–99)
GLUCOSE BLDC GLUCOMTR-MCNC: 76 MG/DL — SIGNIFICANT CHANGE UP (ref 70–99)
GLUCOSE BLDC GLUCOMTR-MCNC: 86 MG/DL — SIGNIFICANT CHANGE UP (ref 70–99)

## 2019-07-08 PROCEDURE — 99233 SBSQ HOSP IP/OBS HIGH 50: CPT

## 2019-07-08 PROCEDURE — 99231 SBSQ HOSP IP/OBS SF/LOW 25: CPT

## 2019-07-08 RX ORDER — DOCUSATE SODIUM 100 MG
100 CAPSULE ORAL THREE TIMES A DAY
Refills: 0 | Status: DISCONTINUED | OUTPATIENT
Start: 2019-07-08 | End: 2019-07-09

## 2019-07-08 RX ORDER — SENNA PLUS 8.6 MG/1
2 TABLET ORAL AT BEDTIME
Refills: 0 | Status: DISCONTINUED | OUTPATIENT
Start: 2019-07-08 | End: 2019-07-13

## 2019-07-08 RX ADMIN — Medication 1 PACKET(S): at 13:49

## 2019-07-08 RX ADMIN — Medication 1 PACKET(S): at 21:30

## 2019-07-08 RX ADMIN — SODIUM CHLORIDE 100 MILLILITER(S): 9 INJECTION, SOLUTION INTRAVENOUS at 16:51

## 2019-07-08 RX ADMIN — HEPARIN SODIUM 5000 UNIT(S): 5000 INJECTION INTRAVENOUS; SUBCUTANEOUS at 05:33

## 2019-07-08 RX ADMIN — Medication 1 PACKET(S): at 05:33

## 2019-07-08 RX ADMIN — Medication 100 MILLIGRAM(S): at 13:48

## 2019-07-08 RX ADMIN — Medication 1 PATCH: at 18:54

## 2019-07-08 RX ADMIN — CHLORHEXIDINE GLUCONATE 1 APPLICATION(S): 213 SOLUTION TOPICAL at 05:33

## 2019-07-08 RX ADMIN — Medication 3 MILLILITER(S): at 19:11

## 2019-07-08 RX ADMIN — Medication 1 PATCH: at 11:38

## 2019-07-08 RX ADMIN — SENNA PLUS 2 TABLET(S): 8.6 TABLET ORAL at 21:29

## 2019-07-08 RX ADMIN — ATORVASTATIN CALCIUM 10 MILLIGRAM(S): 80 TABLET, FILM COATED ORAL at 21:29

## 2019-07-08 RX ADMIN — HEPARIN SODIUM 5000 UNIT(S): 5000 INJECTION INTRAVENOUS; SUBCUTANEOUS at 18:17

## 2019-07-08 RX ADMIN — Medication 1 PATCH: at 23:11

## 2019-07-08 RX ADMIN — Medication 3 MILLILITER(S): at 07:35

## 2019-07-08 RX ADMIN — Medication 10 MILLIGRAM(S): at 05:33

## 2019-07-08 NOTE — PHYSICAL THERAPY INITIAL EVALUATION ADULT - MANUAL MUSCLE TESTING RESULTS, REHAB EVAL
Unable to formally asses MMT secondary to pt confusion, Approximately B/L UEs and LEs about 2+/5/not tested due to

## 2019-07-08 NOTE — PHYSICAL THERAPY INITIAL EVALUATION ADULT - PHYSICAL ASSIST/NONPHYSICAL ASSIST: SUPINE/SIT, REHAB EVAL
verbal cues/Required assistance with trunk rotation and LE placement/set-up required/1 person assist

## 2019-07-08 NOTE — PHYSICAL THERAPY INITIAL EVALUATION ADULT - CRITERIA FOR SKILLED THERAPEUTIC INTERVENTIONS
predicted duration of therapy intervention/functional limitations in following categories/therapy frequency/rehab potential/impairments found/risk reduction/prevention

## 2019-07-08 NOTE — PROGRESS NOTE ADULT - ASSESSMENT
Assessment:  85 y/o F w/ PMH of DLD, HTN, CHF, and recent hospitalization of PNA presented from home w/ fever and AMS. Patient is non-verbal.    #Hypercapnic Encephalopathy- improves w/ BIPAP  - PT consult  - Successfully weaned off BIPAP on 7/3. Currently without oxygen and tolerating PO intake (crackers, soft food, juice).  - Metabolic encephalopathy less likely d/t - BCX/UCX negative- No leukocytosis  - Ammonia elevated (59 on 6/27)   - EEG showed no seizure activity  - Folate/B12/TSH all WNL  - MR brain revealed chronic microvascular changes and chronic lacunar infarctions  - Monitor pulse ox  - CXR shows basilar effusions  - Pulmonary consult recommended weaning off BIPAP, albuterol nebulizers, IV steroids, outpatient PFT's, and GOC discussion  - Palliative care consulted  - Pulmonology is following the case  - Continue BIPAP PRN     #No recent BM  - Senna/Colace    #Hypernatremia - resolved   - patient was on continuous IVF, was changed to D5 in water 100ml/hr  - Sodium, Serum: 146mmol/L (07.07.19)     #Hypoglycemia?  - PO Challenge by speech pathology daily   - IVF D5NS@100ml/hr changed to D5 in water due to previous hypernatremia  - POCT reveals low/lower limit of normal blood glucose levels, even with D50 infusion  - Underlying neoplasm (i.e. insulinoma)?  - Infuse dextrose if <80   - Cortisol AM, Serum: 11.8 ug/dL (06.27.19 @ 21:16) WNL    #Acute urinary retention  - seen on renal/bladder US.  - Funez inserted  - monitor I&O    #KATINA  - Urinary retention related, resolved    DNR/DNI    Placement?

## 2019-07-08 NOTE — PHYSICAL THERAPY INITIAL EVALUATION ADULT - TRANSFER SAFETY CONCERNS NOTED: SIT/STAND, REHAB EVAL
decreased step length/decreased balance during turns/decreased weight-shifting ability/decreased safety awareness/losing balance/decreased sequencing ability

## 2019-07-08 NOTE — PROGRESS NOTE ADULT - ASSESSMENT
86yFemale being reevaluated. Goals of care clear and no symptoms to manage.          Recommendations:  Sign off  Discussed with attending and interdisciplinary team on afternoon rounds.          Please Call [phone] PRN

## 2019-07-08 NOTE — PHYSICAL THERAPY INITIAL EVALUATION ADULT - PERSONAL SAFETY AND JUDGMENT, REHAB EVAL
at risk behaviors demonstrated/Pt is very confused at this time, also unable to control urine during tx,

## 2019-07-08 NOTE — PROGRESS NOTE ADULT - SUBJECTIVE AND OBJECTIVE BOX
86yFemale with diagnosis: PNEUMONIA SEPSIS UTI URINARY TRACT INFECTION    Patient seen, comfortable lying in bed. No visitors/family at bedside. States 'ok'     PHYSICAL EXAM  smiles  respirations easy and unlabored - while sleeping; aroused to voice and light touch; room air  no distress    T(C): , Max: 37 (21:40)  T(F): 98.1  HR: 97 (83 - 97)  BP: 121/58 (113/53 - 159/75)  RR: 18 (18 - 18)  SpO2: 98% (89% - 98%)      LABS:                          11.9   6.07  )-----------( 177      ( 07 Jul 2019 06:12 )             38.3                                                                                      07-07    146  |  107  |  5<L>  ----------------------------<  84  3.9   |  32  |  0.6<L>    Ca    7.1<L>      07 Jul 2019 06:12  Phos  2.2     07-07  Mg     2.2     07-07    TPro  4.8<L>  /  Alb  2.7<L>  /  TBili  0.5  /  DBili  x   /  AST  14  /  ALT  10  /  AlkPhos  71  07-07             MEDICATIONS  (STANDING):  ALBUTerol/ipratropium for Nebulization 3 milliLiter(s) Nebulizer every 6 hours  atorvastatin 10 milliGRAM(s) Oral at bedtime  chlorhexidine 4% Liquid 1 Application(s) Topical <User Schedule>  dextrose 5%. 1000 milliLiter(s) (100 mL/Hr) IV Continuous <Continuous>  docusate sodium 100 milliGRAM(s) Oral three times a day  heparin  Injectable 5000 Unit(s) SubCutaneous every 12 hours  hydrALAZINE 10 milliGRAM(s) Oral daily  nitroglycerin    Patch 0.1 mG/Hr(s) 1 patch Transdermal daily  potassium phosphate / sodium phosphate powder 1 Packet(s) Oral three times a day  senna 2 Tablet(s) Oral at bedtime    MEDICATIONS  (PRN):  acetaminophen   Tablet .. 650 milliGRAM(s) Oral every 6 hours PRN Temp greater or equal to 38C (100.4F)  dextrose 50% Injectable 50 milliLiter(s) IV Push once PRN If Blood glucose is LESS than 70

## 2019-07-08 NOTE — PHYSICAL THERAPY INITIAL EVALUATION ADULT - PLANNED THERAPY INTERVENTIONS, PT EVAL
gait training/postural re-education/strengthening/bed mobility training/balance training/transfer training/ROM

## 2019-07-08 NOTE — PROGRESS NOTE ADULT - SUBJECTIVE AND OBJECTIVE BOX
HPI  Patient is a 86y old Female who presents with a chief complaint of Fever since yesterday (07 Jul 2019 12:56)    Currently admitted to medicine with the primary diagnosis of Pneumonia     Today is hospital day 13d.     INTERVAL HPI / OVERNIGHT EVENTS:  Patient was examined and seen at bedside. This morning she is resting comfortably in bed with oxygen and reports no new issues or overnight events - except that she was fighting off the BIPAP. The patient was saturating 89% on room air, and went up to 96% on a 2 L cannula as per nurse. Patient has a primafit on, with good urine output. The patient was alert, nodded and said "hello". The nurse was concerned that the right arm, which has a midline, was swelling - but the line is flushing well and working. The patient denied chest pan, shortness of breath, or chest tightness.     ROS: Otherwise unremarkable     PAST MEDICAL & SURGICAL HISTORY  HLD (hyperlipidemia)  Diastolic dysfunction  HTN (hypertension)  History of cholecystectomy  H/O bilateral hip replacements: Right Hip ORIF on Xray    ALLERGIES  No Known Allergies    MEDICATIONS  STANDING MEDICATIONS  ALBUTerol/ipratropium for Nebulization 3 milliLiter(s) Nebulizer every 6 hours  atorvastatin 10 milliGRAM(s) Oral at bedtime  chlorhexidine 4% Liquid 1 Application(s) Topical <User Schedule>  dextrose 5%. 1000 milliLiter(s) IV Continuous <Continuous>  docusate sodium 100 milliGRAM(s) Oral three times a day  heparin  Injectable 5000 Unit(s) SubCutaneous every 12 hours  hydrALAZINE 10 milliGRAM(s) Oral daily  nitroglycerin    Patch 0.1 mG/Hr(s) 1 patch Transdermal daily  potassium phosphate / sodium phosphate powder 1 Packet(s) Oral three times a day  senna 2 Tablet(s) Oral at bedtime    PRN MEDICATIONS  acetaminophen   Tablet .. 650 milliGRAM(s) Oral every 6 hours PRN  dextrose 50% Injectable 50 milliLiter(s) IV Push once PRN    VITALS:  T(F): 98  HR: 83  BP: 159/75  RR: 18  SpO2: 98%    PHYSICAL EXAM  GEN: NAD, Resting comfortably in bed with 2 L cannula  PULM: Clear to auscultation bilaterally, No wheezes  CVS: Regular rate and rhythm, S1-S2, no murmurs  ABD: Soft, non-tender, non-distended, no guarding  EXT: Right arm appears mildly swollen as compared to right. Has a midline placed, flushing well.  NEURO: AAOx1, no focal deficits    LABS                        11.9   6.07  )-----------( 177      ( 07 Jul 2019 06:12 )             38.3     07-07    146  |  107  |  5<L>  ----------------------------<  84  3.9   |  32  |  0.6<L>    Ca    7.1<L>      07 Jul 2019 06:12  Phos  2.2     07-07  Mg     2.2     07-07    TPro  4.8<L>  /  Alb  2.7<L>  /  TBili  0.5  /  DBili  x   /  AST  14  /  ALT  10  /  AlkPhos  71  07-07    PT/INR - ( 07 Jul 2019 06:12 )   PT: 11.50 sec;   INR: 1.00 ratio         PTT - ( 07 Jul 2019 06:12 )  PTT:36.8 sec

## 2019-07-08 NOTE — PHYSICAL THERAPY INITIAL EVALUATION ADULT - PHYSICAL ASSIST/NONPHYSICAL ASSIST: SIT/STAND, REHAB EVAL
set-up required/verbal cues/1 person assist/cues for hand placement, pt unable to understand. 2 trials completed. Upon standing Pt has uncontrollable urine at this time, +prima fit but it was not working. PT session put on hold. PT cleaned the area and attempted to perform another sit-to-stand. Pt still unable to control urine. PT session terminated

## 2019-07-08 NOTE — PHYSICAL THERAPY INITIAL EVALUATION ADULT - GAIT DISTANCE, PT EVAL
Pt able to take 1 step fwd/bwd. Pt has uncontrollable urine at this time, +prima fit but it was not working. PT session put on hold. PT cleaned the area and attempted to perform another sit-to-stand. Pt still unable to control urine. PT session terminated. SPO2: 95% both before and after tx./5 feet

## 2019-07-08 NOTE — PHYSICAL THERAPY INITIAL EVALUATION ADULT - IMPAIRMENTS FOUND, PT EVAL
posture/ROM/joint integrity and mobility/muscle strength/cognitive impairment/gait, locomotion, and balance/ergonomics and body mechanics/poor safety awareness

## 2019-07-08 NOTE — PHYSICAL THERAPY INITIAL EVALUATION ADULT - PHYSICAL ASSIST/NONPHYSICAL ASSIST: SIT/SUPINE, REHAB EVAL
set-up required/verbal cues/Required assistance with trunk rotation and LE placement/1 person assist

## 2019-07-08 NOTE — PHYSICAL THERAPY INITIAL EVALUATION ADULT - ADDITIONAL COMMENTS
Pt unable to give accurate subjective information. As per note pt lives with family, HHA 8x7. Prior to admission, pt used rolling walker

## 2019-07-08 NOTE — PHYSICAL THERAPY INITIAL EVALUATION ADULT - GENERAL OBSERVATIONS, REHAB EVAL
PT Initial Evaluation completed. Pt seen from 13:05-13:40 for a total of 35 min. Pt encountered in semifowlers, +bed alarm, +prima fit, +L arm IV, No apparent distress. Pt agreeable to PT. SPO2: 95% before PT, after PT SPO2: 95%.

## 2019-07-08 NOTE — PHYSICAL THERAPY INITIAL EVALUATION ADULT - PHYSICAL ASSIST/NONPHYSICAL ASSIST: STAND/SIT, REHAB EVAL
1 person assist/verbal cues/set-up required/cues for hand placement, pt unable to understand. 2 trials completed. Upon standing Pt has uncontrollable urine at this time, +prima fit but it was not working. PT session put on hold. PT cleaned the area and attempted to perform another sit-to-stand. Pt still unable to control urine. PT session terminated.

## 2019-07-08 NOTE — PHYSICAL THERAPY INITIAL EVALUATION ADULT - REFERRING PHYSICIAN, REHAB EVAL
Dr. Parker
After sternal rub pt briefly opened eyes but was not able to maintain her eyes open. PT will follow up at a later time.

## 2019-07-08 NOTE — PHYSICAL THERAPY INITIAL EVALUATION ADULT - RANGE OF MOTION EXAMINATION, REHAB EVAL
Unable to formally asses ROM secondary to pt confusion, Approximately B/L UEs and LEs about 75 % of normal ROM,

## 2019-07-09 LAB
ANION GAP SERPL CALC-SCNC: 7 MMOL/L — SIGNIFICANT CHANGE UP (ref 7–14)
BASOPHILS # BLD AUTO: 0.01 K/UL — SIGNIFICANT CHANGE UP (ref 0–0.2)
BASOPHILS NFR BLD AUTO: 0.2 % — SIGNIFICANT CHANGE UP (ref 0–1)
BUN SERPL-MCNC: 5 MG/DL — LOW (ref 10–20)
CALCIUM SERPL-MCNC: 8.1 MG/DL — LOW (ref 8.5–10.1)
CHLORIDE SERPL-SCNC: 110 MMOL/L — SIGNIFICANT CHANGE UP (ref 98–110)
CO2 SERPL-SCNC: 27 MMOL/L — SIGNIFICANT CHANGE UP (ref 17–32)
CREAT SERPL-MCNC: 0.6 MG/DL — LOW (ref 0.7–1.5)
EOSINOPHIL # BLD AUTO: 0 K/UL — SIGNIFICANT CHANGE UP (ref 0–0.7)
EOSINOPHIL NFR BLD AUTO: 0 % — SIGNIFICANT CHANGE UP (ref 0–8)
GLUCOSE BLDC GLUCOMTR-MCNC: 90 MG/DL — SIGNIFICANT CHANGE UP (ref 70–99)
GLUCOSE BLDC GLUCOMTR-MCNC: 95 MG/DL — SIGNIFICANT CHANGE UP (ref 70–99)
GLUCOSE BLDC GLUCOMTR-MCNC: 97 MG/DL — SIGNIFICANT CHANGE UP (ref 70–99)
GLUCOSE BLDC GLUCOMTR-MCNC: 98 MG/DL — SIGNIFICANT CHANGE UP (ref 70–99)
GLUCOSE SERPL-MCNC: 92 MG/DL — SIGNIFICANT CHANGE UP (ref 70–99)
HCT VFR BLD CALC: 38.1 % — SIGNIFICANT CHANGE UP (ref 37–47)
HGB BLD-MCNC: 11.6 G/DL — LOW (ref 12–16)
IMM GRANULOCYTES NFR BLD AUTO: 0.4 % — HIGH (ref 0.1–0.3)
LYMPHOCYTES # BLD AUTO: 1.23 K/UL — SIGNIFICANT CHANGE UP (ref 1.2–3.4)
LYMPHOCYTES # BLD AUTO: 26.2 % — SIGNIFICANT CHANGE UP (ref 20.5–51.1)
MAGNESIUM SERPL-MCNC: 1.7 MG/DL — LOW (ref 1.8–2.4)
MCHC RBC-ENTMCNC: 28.9 PG — SIGNIFICANT CHANGE UP (ref 27–31)
MCHC RBC-ENTMCNC: 30.4 G/DL — LOW (ref 32–37)
MCV RBC AUTO: 95 FL — SIGNIFICANT CHANGE UP (ref 81–99)
MONOCYTES # BLD AUTO: 0.29 K/UL — SIGNIFICANT CHANGE UP (ref 0.1–0.6)
MONOCYTES NFR BLD AUTO: 6.2 % — SIGNIFICANT CHANGE UP (ref 1.7–9.3)
NEUTROPHILS # BLD AUTO: 3.14 K/UL — SIGNIFICANT CHANGE UP (ref 1.4–6.5)
NEUTROPHILS NFR BLD AUTO: 67 % — SIGNIFICANT CHANGE UP (ref 42.2–75.2)
NRBC # BLD: 0 /100 WBCS — SIGNIFICANT CHANGE UP (ref 0–0)
PHOSPHATE SERPL-MCNC: 2.6 MG/DL — SIGNIFICANT CHANGE UP (ref 2.1–4.9)
PLATELET # BLD AUTO: 159 K/UL — SIGNIFICANT CHANGE UP (ref 130–400)
POTASSIUM SERPL-MCNC: 4.7 MMOL/L — SIGNIFICANT CHANGE UP (ref 3.5–5)
POTASSIUM SERPL-SCNC: 4.7 MMOL/L — SIGNIFICANT CHANGE UP (ref 3.5–5)
RBC # BLD: 4.01 M/UL — LOW (ref 4.2–5.4)
RBC # FLD: 14.7 % — HIGH (ref 11.5–14.5)
SODIUM SERPL-SCNC: 144 MMOL/L — SIGNIFICANT CHANGE UP (ref 135–146)
WBC # BLD: 4.69 K/UL — LOW (ref 4.8–10.8)
WBC # FLD AUTO: 4.69 K/UL — LOW (ref 4.8–10.8)

## 2019-07-09 PROCEDURE — 70491 CT SOFT TISSUE NECK W/DYE: CPT | Mod: 26

## 2019-07-09 PROCEDURE — 99233 SBSQ HOSP IP/OBS HIGH 50: CPT

## 2019-07-09 PROCEDURE — 99223 1ST HOSP IP/OBS HIGH 75: CPT

## 2019-07-09 RX ORDER — TIOTROPIUM BROMIDE 18 UG/1
1 CAPSULE ORAL; RESPIRATORY (INHALATION) DAILY
Refills: 0 | Status: DISCONTINUED | OUTPATIENT
Start: 2019-07-09 | End: 2019-07-11

## 2019-07-09 RX ORDER — SODIUM CHLORIDE 9 MG/ML
1000 INJECTION, SOLUTION INTRAVENOUS
Refills: 0 | Status: DISCONTINUED | OUTPATIENT
Start: 2019-07-09 | End: 2019-07-10

## 2019-07-09 RX ORDER — POLYETHYLENE GLYCOL 3350 17 G/17G
17 POWDER, FOR SOLUTION ORAL DAILY
Refills: 0 | Status: DISCONTINUED | OUTPATIENT
Start: 2019-07-09 | End: 2019-07-13

## 2019-07-09 RX ORDER — MULTIVIT-MIN/FERROUS GLUCONATE 9 MG/15 ML
1 LIQUID (ML) ORAL DAILY
Refills: 0 | Status: DISCONTINUED | OUTPATIENT
Start: 2019-07-09 | End: 2019-07-13

## 2019-07-09 RX ADMIN — Medication 1 PACKET(S): at 21:22

## 2019-07-09 RX ADMIN — SODIUM CHLORIDE 60 MILLILITER(S): 9 INJECTION, SOLUTION INTRAVENOUS at 15:52

## 2019-07-09 RX ADMIN — CHLORHEXIDINE GLUCONATE 1 APPLICATION(S): 213 SOLUTION TOPICAL at 05:42

## 2019-07-09 RX ADMIN — Medication 1 PACKET(S): at 05:41

## 2019-07-09 RX ADMIN — SENNA PLUS 2 TABLET(S): 8.6 TABLET ORAL at 21:22

## 2019-07-09 RX ADMIN — Medication 10 MILLIGRAM(S): at 11:20

## 2019-07-09 RX ADMIN — Medication 3 MILLILITER(S): at 19:39

## 2019-07-09 RX ADMIN — HEPARIN SODIUM 5000 UNIT(S): 5000 INJECTION INTRAVENOUS; SUBCUTANEOUS at 17:04

## 2019-07-09 RX ADMIN — Medication 1 PATCH: at 20:28

## 2019-07-09 RX ADMIN — Medication 1 PACKET(S): at 14:21

## 2019-07-09 RX ADMIN — Medication 1 TABLET(S): at 17:05

## 2019-07-09 RX ADMIN — Medication 1 PATCH: at 11:21

## 2019-07-09 RX ADMIN — Medication 3 MILLILITER(S): at 07:27

## 2019-07-09 RX ADMIN — ATORVASTATIN CALCIUM 10 MILLIGRAM(S): 80 TABLET, FILM COATED ORAL at 21:22

## 2019-07-09 RX ADMIN — HEPARIN SODIUM 5000 UNIT(S): 5000 INJECTION INTRAVENOUS; SUBCUTANEOUS at 05:41

## 2019-07-09 RX ADMIN — Medication 3 MILLILITER(S): at 13:31

## 2019-07-09 RX ADMIN — Medication 10 MILLIGRAM(S): at 05:41

## 2019-07-09 NOTE — CONSULT NOTE ADULT - ASSESSMENT
86 y.o female a/w AMS/respiratory failure - r/o upper airway obstruction. 86 y.o female a/w AMS/respiratory failure - r/o upper airway obstruction, non compliant with scope.    ·	CT neck to r/o pathology as pt non cooperative with scope  ·	Dr Stanford at bedside.

## 2019-07-09 NOTE — PROGRESS NOTE ADULT - ASSESSMENT
Assessment:  87 y/o F w/ PMH of DLD, HTN, CHF, and recent hospitalization of PNA presented from home w/ fever and AMS. Patient is non-verbal.    #Hypercapnic Encephalopathy- improves w/ BIPAP  - ABG tomorrow morning  - Patient comfortable on room air this morning. Dietician recommended dysphagia 3 with ensure pudding TID + Multivitmain w/ mineral  - PT consult?  - Successfully weaned off BIPAP on 7/3. Continuing PRN  - Metabolic encephalopathy less likely d/t - BCX/UCX negative- No leukocytosis  - Ammonia elevated (59 on 6/27),  Folate/B12/TSH all WNL  - EEG showed no seizure activity  - MR brain revealed chronic microvascular changes and chronic lacunar infarctions  - Monitor pulse ox  - CXR shows basilar effusions  - Pulmonary consult recommended weaning off BIPAP, albuterol nebulizers, IV steroids, outpatient PFT's, and GOC discussion  - Palliative care consulted - no intervention at this time as patient is improving  - Called pulmonology earlier, awaiting response    #End expiratory grunting noise - upper airway issue?  - Consulting ENT    #No recent BM  - Senna/Colace    #HTN  - Enalapril     #Hypernatremia - resolved  - Sodium, Serum: 144 mmol/L (07.07.19)   - Changing fluids to 1/2NS @60ml/hr  - BMP in the morning  - repeating Mg    #Hypoglycemia?  - PO Challenge by speech pathology daily   - IVF 1/2NS @60ml/hr  - POCT reveals low/lower limit of normal blood glucose levels, even with D50 infusion  - Underlying neoplasm (i.e. insulinoma)?  - Infuse dextrose if <80   - Cortisol AM, Serum: 11.8 ug/dL (06.27.19 @ 21:16) WNL    #Acute urinary retention  - seen on renal/bladder US.  - Funez inserted  - monitor I&O    #KATINA  - Urinary retention related, resolved    DNR/DNI    Placement?

## 2019-07-09 NOTE — CHART NOTE - NSCHARTNOTEFT_GEN_A_CORE
Registered Dietitian Follow-Up     Patient Profile Reviewed                           Yes [x]   No []     Nutrition History Previously Obtained        Yes [x]  No []      Pertinent Subjective Information: 1:1 feed. Spoke w/ PCA. Often refusing meals or having only a few bites and refusing rest of meal. Per SLP: pt can tolerate dys-3-thins.     Pertinent Medical Interventions: Family is opting for DNR/DNI- paliative signed off. Hypercapnic Encephalopathy. Was successfully weaned off BIPAP. Hypernatremia - resolved. Hypoglycemia? - PO Challenge by speech pathology daily. KATINA, urinary retention related, resolved    Diet order: Dys 2 mech soft, DASH/TLC     Anthropometrics:  - Ht. 60"  - Wt. 7/8 120# - bed scale error. Last wt 101# more likely  - %wt change  - BMI   IBW: 100#+/-10%      Pertinent Lab Data: (7/9) BUN 5, Cr 0.6, Mg 1.7  consistent with inadequate PO intake      Pertinent Meds: heparin, d5w 100ml/h, miralax, senna,  albuterol, lipitorm Phos-Nak     Physical Findings:  - Appearance: alert, confused (AMS and likely superimposed progressive dementia)  - GI function: LBM 7/8 per RN  - Tubes:  - Oral/Mouth cavity: dys-3-thins per SLP  - Skin: 2+ edema R arm      Nutrition Requirements  Weight Used: needs cont from RD assessment 7/2      estimated energy needs: 990-1075kcal (MSJx1.2-1.3AF)  estimated protein needs: 46-55g (1.0-1.2g/kg)  estimated fluid needs: 1ml/kcal or per LIP    Nutrient Intake: not meeting needs         [] Previous Nutrition Diagnosis: inadequate protein-energy intake + unintentional wt loss            [x] Ongoing          [] Resolved  However, despite pt family reporting #, per previous admit, pt wt was 105# in June    [] No active nutrition diagnosis identified at this time       Nutrition Intervention meal/snack, med food supplement, nutrition-related medication management, vit/min supplementation     Goal/Expected Outcome: Pt to consume >50% of meal tray and at least maintain CBW in 1-2 lbs in 3 days      Indicator/Monitoring: RD to monitor energy intake, diet order, body comp, NFPF electrolyte profile    Recommendation: Cont diet texture per SLP (rec dys-3-thins). Continue with DASH/TLC and add ensure pudding TID. Would consider appetite stimulant if deemed medically feasible as pt refusing to eat. Please add MVI/minerals.    --discussed with covering resident

## 2019-07-09 NOTE — PROGRESS NOTE ADULT - SUBJECTIVE AND OBJECTIVE BOX
HPI  Patient is a 86y old Female who presents with a chief complaint of Fever since yesterday (09 Jul 2019 13:08)    Currently admitted to medicine with the primary diagnosis of Pneumonia     Today is hospital day 14d.     INTERVAL HPI / OVERNIGHT EVENTS:  Patient was examined and seen at bedside. This morning she is resting comfortably in bed without oxygen and reports feeling "OK". A mask with oxygen on was noticed to be on the bed. Patient has a primafit on, with good urine output. The patient was alert, nodded and said "hello". The patient did not complain of any chest pan, shortness of breath, or chest tightness. Patient has a lesion on the nose from the BIPAP. No BM reported.     ROS: Otherwise unremarkable HPI  Patient is a 86y old Female who presents with a chief complaint of Fever since yesterday (09 Jul 2019 13:08)    Currently admitted to medicine with the primary diagnosis of Pneumonia     Today is hospital day 14d.     INTERVAL HPI / OVERNIGHT EVENTS:  Patient was examined and seen at bedside. This morning she is resting comfortably in bed without oxygen and reports feeling "OK". A mask with oxygen on was noticed to be on the bed. Patient has a primafit on, with good urine output. The patient was alert, nodded and said "hello". The patient did not complain of any chest pan, shortness of breath, or chest tightness. Patient has a lesion on the nose from the BIPAP. No BM reported.     ROS: Otherwise unremarkable     PAST MEDICAL & SURGICAL HISTORY  HLD (hyperlipidemia)  Diastolic dysfunction  HTN (hypertension)  History of cholecystectomy  H/O bilateral hip replacements: Right Hip ORIF on Xray    ALLERGIES  No Known Allergies    MEDICATIONS  STANDING MEDICATIONS  ALBUTerol/ipratropium for Nebulization 3 milliLiter(s) Nebulizer every 6 hours  atorvastatin 10 milliGRAM(s) Oral at bedtime  chlorhexidine 4% Liquid 1 Application(s) Topical <User Schedule>  dextrose 5%. 1000 milliLiter(s) IV Continuous <Continuous>  enalapril 10 milliGRAM(s) Oral daily  heparin  Injectable 5000 Unit(s) SubCutaneous every 12 hours  nitroglycerin    Patch 0.1 mG/Hr(s) 1 patch Transdermal daily  potassium phosphate / sodium phosphate powder 1 Packet(s) Oral three times a day  senna 2 Tablet(s) Oral at bedtime  tiotropium 18 MICROgram(s) Capsule 1 Capsule(s) Inhalation daily    PRN MEDICATIONS  acetaminophen   Tablet .. 650 milliGRAM(s) Oral every 6 hours PRN  dextrose 50% Injectable 50 milliLiter(s) IV Push once PRN  polyethylene glycol 3350 17 Gram(s) Oral daily PRN    VITALS:  T(F): 96.8  HR: 82  BP: 161/74  RR: 18  SpO2: 93%    PHYSICAL EXAM  GEN: NAD, Resting comfortably in bed on room air  PULM: Clear to auscultation bilaterally, No wheezes  CVS: Regular rate and rhythm, S1-S2, audible systolic murmur  ABD: Soft, non-tender, non-distended, no guarding  EXT: No edema  NEURO: AAOx1, says her name    LABS                        11.6   4.69  )-----------( 159      ( 09 Jul 2019 11:39 )             38.1     07-09    144  |  110  |  5<L>  ----------------------------<  92  4.7   |  27  |  0.6<L>    Ca    8.1<L>      09 Jul 2019 11:39  Phos  2.6     07-09  Mg     1.7     07-09 HPI  Patient is a 86y old Female who presents with a chief complaint of Fever since yesterday (09 Jul 2019 13:08)    Currently admitted to medicine with the primary diagnosis of Pneumonia     Today is hospital day 14d.     INTERVAL HPI / OVERNIGHT EVENTS:  Patient was examined and seen at bedside. This morning she is resting comfortably in bed without oxygen and reports feeling "OK". A mask with oxygen on was noticed to be on the bed. Patient has a primafit on, with good urine output. The patient was alert, nodded and said "hello". The patient did not complain of any chest pan, shortness of breath, or chest tightness. Patient has a lesion on the nose from the BIPAP. No BM reported.     ROS: Otherwise unremarkable     PAST MEDICAL & SURGICAL HISTORY  HLD (hyperlipidemia)  Diastolic dysfunction  HTN (hypertension)  History of cholecystectomy  H/O bilateral hip replacements: Right Hip ORIF on Xray    ALLERGIES  No Known Allergies    MEDICATIONS  STANDING MEDICATIONS  ALBUTerol/ipratropium for Nebulization 3 milliLiter(s) Nebulizer every 6 hours  atorvastatin 10 milliGRAM(s) Oral at bedtime  chlorhexidine 4% Liquid 1 Application(s) Topical <User Schedule>  dextrose 5%. 1000 milliLiter(s) IV Continuous <Continuous>  enalapril 10 milliGRAM(s) Oral daily  heparin  Injectable 5000 Unit(s) SubCutaneous every 12 hours  nitroglycerin    Patch 0.1 mG/Hr(s) 1 patch Transdermal daily  potassium phosphate / sodium phosphate powder 1 Packet(s) Oral three times a day  senna 2 Tablet(s) Oral at bedtime  tiotropium 18 MICROgram(s) Capsule 1 Capsule(s) Inhalation daily    PRN MEDICATIONS  acetaminophen   Tablet .. 650 milliGRAM(s) Oral every 6 hours PRN  dextrose 50% Injectable 50 milliLiter(s) IV Push once PRN  polyethylene glycol 3350 17 Gram(s) Oral daily PRN    VITALS:  T(F): 96.8  HR: 82  BP: 161/74  RR: 18  SpO2: 93%    PHYSICAL EXAM  GEN: NAD, Resting comfortably in bed on room air  PULM: Clear to auscultation bilaterally, No wheezes. End expiratory grunting noise appreciated   CVS: Regular rate and rhythm, S1-S2, audible systolic murmur  ABD: Soft, non-tender, non-distended, no guarding  EXT: No edema  NEURO: AAOx1, says her name    LABS                        11.6   4.69  )-----------( 159      ( 09 Jul 2019 11:39 )             38.1     07-09    144  |  110  |  5<L>  ----------------------------<  92  4.7   |  27  |  0.6<L>    Ca    8.1<L>      09 Jul 2019 11:39  Phos  2.6     07-09  Mg     1.7     07-09

## 2019-07-09 NOTE — CONSULT NOTE ADULT - SUBJECTIVE AND OBJECTIVE BOX
Pt is an 86 y.o female admitted with AMS, acute on chronic hypercapnic respiratory failure, weaned off BIPAP 7/7 - called to evaluate for grunting noise heard at end of expiration, would like to rule out upper airway obstruction.     PAST MEDICAL & SURGICAL HISTORY:  HLD (hyperlipidemia)  Diastolic dysfunction  HTN (hypertension)  History of cholecystectomy  H/O bilateral hip replacements: Right Hip ORIF on Xray  MEDICATIONS  (STANDING):  ALBUTerol/ipratropium for Nebulization 3 milliLiter(s) Nebulizer every 6 hours  atorvastatin 10 milliGRAM(s) Oral at bedtime  chlorhexidine 4% Liquid 1 Application(s) Topical <User Schedule>  dextrose 5%. 1000 milliLiter(s) (100 mL/Hr) IV Continuous <Continuous>  enalapril 10 milliGRAM(s) Oral daily  heparin  Injectable 5000 Unit(s) SubCutaneous every 12 hours  nitroglycerin    Patch 0.1 mG/Hr(s) 1 patch Transdermal daily  potassium phosphate / sodium phosphate powder 1 Packet(s) Oral three times a day  senna 2 Tablet(s) Oral at bedtime  tiotropium 18 MICROgram(s) Capsule 1 Capsule(s) Inhalation daily  ALLERGIES: NKDA    Vital Signs: T(F): 96.8 (09 Jul 2019 12:16), Max: 97.9 (08 Jul 2019 21:36), HR: 82, BP: 161/74, RR: 18, SpO2: 93%  GEN:  HEENT:                          11.6   4.69  )-----------( 159                38.1     144  |  110  |  5<L>  ----------------------------<  92  4.7   |  27  |  0.6<L>    Ca    8.1<L>   Phos  2.6    Mg     1.7 Pt is an 86 y.o female admitted with AMS, acute on chronic hypercapnic respiratory failure, weaned off BIPAP 7/7 - called to evaluate for grunting noise heard at end of expiration, would like to rule out upper airway obstruction. Pt seen and examined at bedside, responding with nodding her head. Pt otherwise not responding appropriately to questioning, known dementia.    PAST MEDICAL & SURGICAL HISTORY:  HLD (hyperlipidemia)  Diastolic dysfunction  HTN (hypertension)  History of cholecystectomy  H/O bilateral hip replacements: Right Hip ORIF on Xray  MEDICATIONS  (STANDING):  ALBUTerol/ipratropium for Nebulization 3 milliLiter(s) Nebulizer every 6 hours  atorvastatin 10 milliGRAM(s) Oral at bedtime  chlorhexidine 4% Liquid 1 Application(s) Topical <User Schedule>  dextrose 5%. 1000 milliLiter(s) (100 mL/Hr) IV Continuous <Continuous>  enalapril 10 milliGRAM(s) Oral daily  heparin  Injectable 5000 Unit(s) SubCutaneous every 12 hours  nitroglycerin    Patch 0.1 mG/Hr(s) 1 patch Transdermal daily  potassium phosphate / sodium phosphate powder 1 Packet(s) Oral three times a day  senna 2 Tablet(s) Oral at bedtime  tiotropium 18 MICROgram(s) Capsule 1 Capsule(s) Inhalation daily  ALLERGIES: NKDA    Vital Signs: T(F): 96.8 (09 Jul 2019 12:16), Max: 97.9 (08 Jul 2019 21:36), HR: 82, BP: 161/74, RR: 18, SpO2: 93%  GEN: NAD, awake and alert. no drooling or pooling of secretions. no stridor or stertor noted. no grunting noted at this time.  HEENT: oral mucosa pink, no erythema/edema, uvula midline, neck supple.    FFL - attempted at bedside x3, pt not cooperative.                          11.6   4.69  )-----------( 159                38.1     144  |  110  |  5<L>  ----------------------------<  92  4.7   |  27  |  0.6<L>    Ca    8.1<L>   Phos  2.6    Mg     1.7

## 2019-07-10 LAB
ANION GAP SERPL CALC-SCNC: 7 MMOL/L — SIGNIFICANT CHANGE UP (ref 7–14)
BASE EXCESS BLDA CALC-SCNC: 4.4 MMOL/L — HIGH (ref -2–2)
BASOPHILS # BLD AUTO: 0.01 K/UL — SIGNIFICANT CHANGE UP (ref 0–0.2)
BASOPHILS NFR BLD AUTO: 0.2 % — SIGNIFICANT CHANGE UP (ref 0–1)
BUN SERPL-MCNC: 6 MG/DL — LOW (ref 10–20)
CALCIUM SERPL-MCNC: 8.5 MG/DL — SIGNIFICANT CHANGE UP (ref 8.5–10.1)
CHLORIDE SERPL-SCNC: 109 MMOL/L — SIGNIFICANT CHANGE UP (ref 98–110)
CO2 SERPL-SCNC: 26 MMOL/L — SIGNIFICANT CHANGE UP (ref 17–32)
CREAT SERPL-MCNC: 0.6 MG/DL — LOW (ref 0.7–1.5)
EOSINOPHIL # BLD AUTO: 0 K/UL — SIGNIFICANT CHANGE UP (ref 0–0.7)
EOSINOPHIL NFR BLD AUTO: 0 % — SIGNIFICANT CHANGE UP (ref 0–8)
GAS PNL BLDA: SIGNIFICANT CHANGE UP
GLUCOSE BLDC GLUCOMTR-MCNC: 105 MG/DL — HIGH (ref 70–99)
GLUCOSE BLDC GLUCOMTR-MCNC: 118 MG/DL — HIGH (ref 70–99)
GLUCOSE BLDC GLUCOMTR-MCNC: 92 MG/DL — SIGNIFICANT CHANGE UP (ref 70–99)
GLUCOSE BLDC GLUCOMTR-MCNC: 95 MG/DL — SIGNIFICANT CHANGE UP (ref 70–99)
GLUCOSE SERPL-MCNC: 81 MG/DL — SIGNIFICANT CHANGE UP (ref 70–99)
HCO3 BLDA-SCNC: 29 MMOL/L — HIGH (ref 23–27)
HCT VFR BLD CALC: 36.8 % — LOW (ref 37–47)
HGB BLD-MCNC: 11.4 G/DL — LOW (ref 12–16)
HOROWITZ INDEX BLDA+IHG-RTO: 21 — SIGNIFICANT CHANGE UP
IMM GRANULOCYTES NFR BLD AUTO: 0.5 % — HIGH (ref 0.1–0.3)
LYMPHOCYTES # BLD AUTO: 1.29 K/UL — SIGNIFICANT CHANGE UP (ref 1.2–3.4)
LYMPHOCYTES # BLD AUTO: 29.7 % — SIGNIFICANT CHANGE UP (ref 20.5–51.1)
MAGNESIUM SERPL-MCNC: 1.8 MG/DL — SIGNIFICANT CHANGE UP (ref 1.8–2.4)
MCHC RBC-ENTMCNC: 29.2 PG — SIGNIFICANT CHANGE UP (ref 27–31)
MCHC RBC-ENTMCNC: 31 G/DL — LOW (ref 32–37)
MCV RBC AUTO: 94.4 FL — SIGNIFICANT CHANGE UP (ref 81–99)
MONOCYTES # BLD AUTO: 0.27 K/UL — SIGNIFICANT CHANGE UP (ref 0.1–0.6)
MONOCYTES NFR BLD AUTO: 6.2 % — SIGNIFICANT CHANGE UP (ref 1.7–9.3)
NEUTROPHILS # BLD AUTO: 2.75 K/UL — SIGNIFICANT CHANGE UP (ref 1.4–6.5)
NEUTROPHILS NFR BLD AUTO: 63.4 % — SIGNIFICANT CHANGE UP (ref 42.2–75.2)
NRBC # BLD: 0 /100 WBCS — SIGNIFICANT CHANGE UP (ref 0–0)
PCO2 BLDA: 44 MMHG — HIGH (ref 38–42)
PH BLDA: 7.44 — HIGH (ref 7.38–7.42)
PHOSPHATE SERPL-MCNC: 2.9 MG/DL — SIGNIFICANT CHANGE UP (ref 2.1–4.9)
PLATELET # BLD AUTO: 168 K/UL — SIGNIFICANT CHANGE UP (ref 130–400)
PO2 BLDA: 66 MMHG — LOW (ref 78–95)
POTASSIUM SERPL-MCNC: 5.1 MMOL/L — HIGH (ref 3.5–5)
POTASSIUM SERPL-SCNC: 5.1 MMOL/L — HIGH (ref 3.5–5)
RBC # BLD: 3.9 M/UL — LOW (ref 4.2–5.4)
RBC # FLD: 14.6 % — HIGH (ref 11.5–14.5)
SAO2 % BLDA: 94 % — SIGNIFICANT CHANGE UP (ref 94–98)
SODIUM SERPL-SCNC: 142 MMOL/L — SIGNIFICANT CHANGE UP (ref 135–146)
WBC # BLD: 4.34 K/UL — LOW (ref 4.8–10.8)
WBC # FLD AUTO: 4.34 K/UL — LOW (ref 4.8–10.8)

## 2019-07-10 PROCEDURE — 99233 SBSQ HOSP IP/OBS HIGH 50: CPT

## 2019-07-10 RX ORDER — AMLODIPINE BESYLATE 2.5 MG/1
5 TABLET ORAL DAILY
Refills: 0 | Status: DISCONTINUED | OUTPATIENT
Start: 2019-07-10 | End: 2019-07-12

## 2019-07-10 RX ORDER — MEGESTROL ACETATE 40 MG/ML
400 SUSPENSION ORAL
Refills: 0 | Status: DISCONTINUED | OUTPATIENT
Start: 2019-07-10 | End: 2019-07-10

## 2019-07-10 RX ORDER — MEGESTROL ACETATE 40 MG/ML
400 SUSPENSION ORAL
Refills: 0 | Status: DISCONTINUED | OUTPATIENT
Start: 2019-07-10 | End: 2019-07-13

## 2019-07-10 RX ORDER — MEGESTROL ACETATE 40 MG/ML
400 SUSPENSION ORAL DAILY
Refills: 0 | Status: DISCONTINUED | OUTPATIENT
Start: 2019-07-10 | End: 2019-07-10

## 2019-07-10 RX ADMIN — ATORVASTATIN CALCIUM 10 MILLIGRAM(S): 80 TABLET, FILM COATED ORAL at 21:40

## 2019-07-10 RX ADMIN — TIOTROPIUM BROMIDE 1 CAPSULE(S): 18 CAPSULE ORAL; RESPIRATORY (INHALATION) at 07:42

## 2019-07-10 RX ADMIN — Medication 1 PATCH: at 00:28

## 2019-07-10 RX ADMIN — Medication 3 MILLILITER(S): at 07:38

## 2019-07-10 RX ADMIN — Medication 3 MILLILITER(S): at 13:25

## 2019-07-10 RX ADMIN — MEGESTROL ACETATE 400 MILLIGRAM(S): 40 SUSPENSION ORAL at 17:08

## 2019-07-10 RX ADMIN — Medication 1 PACKET(S): at 05:31

## 2019-07-10 RX ADMIN — SENNA PLUS 2 TABLET(S): 8.6 TABLET ORAL at 21:40

## 2019-07-10 RX ADMIN — HEPARIN SODIUM 5000 UNIT(S): 5000 INJECTION INTRAVENOUS; SUBCUTANEOUS at 05:30

## 2019-07-10 RX ADMIN — Medication 10 MILLIGRAM(S): at 05:30

## 2019-07-10 RX ADMIN — Medication 3 MILLILITER(S): at 19:22

## 2019-07-10 RX ADMIN — CHLORHEXIDINE GLUCONATE 1 APPLICATION(S): 213 SOLUTION TOPICAL at 05:31

## 2019-07-10 RX ADMIN — HEPARIN SODIUM 5000 UNIT(S): 5000 INJECTION INTRAVENOUS; SUBCUTANEOUS at 17:09

## 2019-07-10 RX ADMIN — Medication 1 PACKET(S): at 13:27

## 2019-07-10 RX ADMIN — Medication 1 TABLET(S): at 11:11

## 2019-07-10 RX ADMIN — Medication 1 PATCH: at 11:11

## 2019-07-10 RX ADMIN — Medication 1 PATCH: at 21:38

## 2019-07-10 RX ADMIN — AMLODIPINE BESYLATE 5 MILLIGRAM(S): 2.5 TABLET ORAL at 11:10

## 2019-07-10 NOTE — SWALLOW BEDSIDE ASSESSMENT ADULT - NS ASR SWALLOW FINDINGS DISCUS
Dr. Fox; RN Malgorzata/Nursing/Physician/Patient
Dr. Fox; SLIM Mcgarry/Physician/Nursing/Patient
Nursing/Physician/Patient/Dr. Fox; SLIM Marquez
Dr. Fox/Physician/Nursing/Patient
Nursing/Physician/Patient/Dr. Fox; RN Cristina
Patient/Physician/Nursing/Dr. Fox
Dr. Fox/Patient/Physician

## 2019-07-10 NOTE — PROGRESS NOTE ADULT - SUBJECTIVE AND OBJECTIVE BOX
HPI  Patient is a 86y old Female who presents with a chief complaint of Fever since yesterday (09 Jul 2019 14:15)    Currently admitted to medicine with the primary diagnosis of Pneumonia     Today is hospital day 15d.     INTERVAL HPI / OVERNIGHT EVENTS:  Patient was examined and seen at bedside. This morning she is resting comfortably in bed without oxygen with grunting sounds. Patient has a primafit on, with good urine output. The patient was alert, nodded and said "hello", however seemed to be more nonspecific with her responses vs. yesterday. The patient did not complain of any chest pan, shortness of breath, or chest tightness. Patient has a lesion on the nose from the BIPAP. No BM reported.   Continued to grunt while laying in bed, asked nurse to assess her pulse ox as she was on room air. Satted at 95%.    ROS: Otherwise unremarkable     PAST MEDICAL & SURGICAL HISTORY  HLD (hyperlipidemia)  Diastolic dysfunction  HTN (hypertension)  History of cholecystectomy  H/O bilateral hip replacements: Right Hip ORIF on Xray    ALLERGIES  No Known Allergies    MEDICATIONS  STANDING MEDICATIONS  ALBUTerol/ipratropium for Nebulization 3 milliLiter(s) Nebulizer every 6 hours  amLODIPine   Tablet 5 milliGRAM(s) Oral daily  atorvastatin 10 milliGRAM(s) Oral at bedtime  chlorhexidine 4% Liquid 1 Application(s) Topical <User Schedule>  heparin  Injectable 5000 Unit(s) SubCutaneous every 12 hours  megestrol Suspension 400 milliGRAM(s) Oral <User Schedule>  multivitamin/minerals 1 Tablet(s) Oral daily  nitroglycerin    Patch 0.1 mG/Hr(s) 1 patch Transdermal daily  potassium phosphate / sodium phosphate powder 1 Packet(s) Oral three times a day  senna 2 Tablet(s) Oral at bedtime  sodium chloride 0.45%. 1000 milliLiter(s) IV Continuous <Continuous>  tiotropium 18 MICROgram(s) Capsule 1 Capsule(s) Inhalation daily    PRN MEDICATIONS  acetaminophen   Tablet .. 650 milliGRAM(s) Oral every 6 hours PRN  dextrose 50% Injectable 50 milliLiter(s) IV Push once PRN  polyethylene glycol 3350 17 Gram(s) Oral daily PRN    VITALS:  T(F): 96.9  HR: 81  BP: 162/73  RR: 18  SpO2: 95%    PHYSICAL EXAM  GEN: NAD, Resting comfortably in bed on room air  PULM: Clear to auscultation bilaterally, No wheezes. End expiratory grunting noise. Grunting audibly while laying in bed.   CVS: Regular rate and rhythm, S1-S2, audible systolic murmur appreciated left parasternal  ABD: Soft, non-tender, non-distended, no guarding  EXT: No edema  NEURO: AAOx1, says her name. Nods yes/no to questions     LABS                        11.4   4.34  )-----------( 168      ( 10 Jul 2019 05:35 )             36.8     07-10    142  |  109  |  6<L>  ----------------------------<  81  5.1<H>   |  26  |  0.6<L>    Ca    8.5      10 Jul 2019 05:35  Phos  2.9     07-10  Mg     1.8     07-10      ABG - ( 10 Jul 2019 08:29 )  pH, Arterial: 7.44  pH, Blood: x     /  pCO2: 44    /  pO2: 66    / HCO3: 29    / Base Excess: 4.4   /  SaO2: 94        RADIOLOGY  < from: CT Neck Soft Tissue w/ IV Cont (07.09.19 @ 20:51) >  Impression:    Density in a left lower lobe bronchus as seen on prior CT chest. Interval   development of postobstructive atelectasis. This may represent mucous   plugging. Bilateral pleural effusions, partially imaged. Consider further   evaluation with chest CT and/or bronchoscopy.    < end of copied text >

## 2019-07-10 NOTE — PROGRESS NOTE ADULT - ASSESSMENT
Assessment:  85 y/o F w/ PMH of DLD, HTN, CHF, and recent hospitalization of PNA presented from home w/ fever and AMS. Patient is non-verbal.    #Hypercapnic Encephalopathy- improves w/ BIPAP  - ABG: ABG - ( 10 Jul 2019 08:29 )  pH, Arterial: 7.44  pH, Blood: x     /  pCO2: 44    /  pO2: 66    / HCO3: 29    / Base Excess: 4.4   /  SaO2: 94       - Patient comfortable on room air this morning. Dietician yesterday recommended dysphagia 3 with ensure pudding TID + Multivitmain w/ mineral  - PT consult?  - Successfully weaned off BIPAP on 7/3. Continuing PRN  - Metabolic encephalopathy less likely d/t - BCX/UCX negative- No leukocytosis  - Ammonia elevated (59 on 6/27),  Folate/B12/TSH all WNL  - EEG showed no seizure activity  - MR brain revealed chronic microvascular changes and chronic lacunar infarctions  - Monitor pulse ox  - CXR shows basilar effusions  - Pulmonary consult recommended weaning off BIPAP, albuterol nebulizers, IV steroids, outpatient PFT's, and GOC discussion  - Palliative care consulted - no intervention at this time as patient is improving  - Called pulmonology earlier, awaiting response    #End expiratory grunting noise - upper airway issue?  - ENT recommended CT. CT showed a postobstructive atelectasis likely representing a mucus plug, with partially imaged bilateral pleural effusions.  - Patient refused a scopr    #No recent BM  - Senna/Colace    #Limited PO Intake  - Megestrol 400 suspension BID    #HTN  - Enalapril discontinued due to rising potassium (5.1)  - Switched to norvasc    #Hypernatremia - resolved  - Sodium, Serum: 142 mmol/L (07.07.19)   - Changing fluids to 1/2NS @60ml/hr  - BMP in the morning  - repeating Mg    #Hypoglycemia?  - PO Challenge by speech pathology daily   - IVF 1/2NS @60ml/hr  - POCT reveals low/lower limit of normal blood glucose levels, even with D50 infusion  - Underlying neoplasm (i.e. insulinoma)?  - Infuse dextrose if <80   - Cortisol AM, Serum: 11.8 ug/dL (06.27.19 @ 21:16) WNL    #Acute urinary retention  - seen on renal/bladder US.  - Funez inserted  - monitor I&O    #KATINA  - Urinary retention related, resolved    DNR/DNI    Placement?

## 2019-07-10 NOTE — SWALLOW BEDSIDE ASSESSMENT ADULT - ASR SWALLOW ASPIRATION MONITOR
oral hygiene/position upright (90Y)
position upright (90Y)/oral hygiene
position upright (90Y)/oral hygiene
oral hygiene/gurgly voice/change of breathing pattern/position upright (90Y)/cough/pneumonia/upper respiratory infection/fever/throat clearing
pneumonia/throat clearing/upper respiratory infection/oral hygiene/position upright (90Y)/fever/change of breathing pattern/cough/gurgly voice
upper respiratory infection/oral hygiene/position upright (90Y)/throat clearing/cough/gurgly voice/change of breathing pattern/pneumonia/fever
gurgly voice/upper respiratory infection/change of breathing pattern/cough/throat clearing/oral hygiene/position upright (90Y)/fever/pneumonia

## 2019-07-10 NOTE — SWALLOW BEDSIDE ASSESSMENT ADULT - SLP PERTINENT HISTORY OF CURRENT PROBLEM
Pt presented w/ fever x1 day; recent hospitalization for PNA, known to  service w/ reccs on 6/11 for dysphagia 3 w/ nectar thick liquids. PMHx: DLD, HTN. MD reccs for EEG and MRI to r/o new CVA. Pt was weaned off bipap to 3L O2 via nasal cannula. CT neck (+) post-obstructive atelectasis likely representing a mucus plug, with partially imaged bilateral pleural effusions.

## 2019-07-10 NOTE — SWALLOW BEDSIDE ASSESSMENT ADULT - SLP GENERAL OBSERVATIONS
Pt received laying in bed, repositioned upright. Pt lethargic and unable to maintain adequate arousal. Pt did not appear oriented, unable to follow commands, not able to attend to feeding task and did not produce much verbalization besides "hi" and her name. Pt w/ weak cough, gurgly vocal quality and did not appear to be managing her own secretions well.
Pt received laying in bed, repositioned upright. Pt lethargic and unable to maintain adequate arousal. Pt unable to follow commands, not able to attend to feeding task w/ no verbalizations produced. Pt does not appear to be managing her secretions well w/ increased secretions in oral cavity.
Pt received laying in bed w/ continuous bipap in place.
Pt received laying in bed w/ bipap in place. Pt reported decreased appetite, but no difficulties w/ current diet. Pt alert and oriented to self.
Pt received laying in bed, alert and oriented to self, +room air.
Pt received laying in bed, alert and oriented to self, +room air.
Pt received laying in bed, repositioned upright for PO trials. Pt w/ 3L O2 via nasal cannula in place. Pt reported she was not hungry, accepted minimal Po trials, however accepted more trials with max encouragement.

## 2019-07-10 NOTE — SWALLOW BEDSIDE ASSESSMENT ADULT - NS SPL SWALLOW CLINIC TRIAL FT
PO trials not appropriate at this time.
+toleration w/o overt s/s penetration/aspiration w/ thins.
+toleration w/o overt s/s penetration/aspiration w/ thins.
+toleration w/o overt s/s penetration/aspiration w/ nectar thick liquids

## 2019-07-10 NOTE — SWALLOW BEDSIDE ASSESSMENT ADULT - SWALLOW EVAL: RECOMMENDED FEEDING/EATING TECHNIQUES
oral hygiene/alternate food with liquid/position upright (90 degrees)/small sips/bites
position upright (90 degrees)/oral hygiene/small sips/bites
small sips/bites/alternate food with liquid/oral hygiene/position upright (90 degrees)
small sips/bites/oral hygiene/position upright (90 degrees)

## 2019-07-10 NOTE — SWALLOW BEDSIDE ASSESSMENT ADULT - SWALLOW EVAL: RECOMMENDED DIET
NPO, alternate means of nutrition/hydration
NPO, alternate means of nutrition/hydration
dysphagia 2 (soft, ground) w/ thin liquids, when not on bipap
dysphagia 3 (soft, cut up) w/ thin liquids
dysphagia 3 (soft, cut up) w/ thin liquids
NPO, alternate means of nutrition/hydration
dysphagia 2 (soft, ground) w. thin liquids

## 2019-07-10 NOTE — PROGRESS NOTE ADULT - SUBJECTIVE AND OBJECTIVE BOX
Pt is an 86 y.o female seen for expiratory grunting noises - would not comply with bedside fiberoptic laryngoscopy, CT neck recommended to r/o upper airway obstruction. CT neck done, reviewed - no upper airway obstruction, pt is noted to have density in lower lobe, postobstructive atelectasis and possible mucus plug. No acute ENT intervention - consider pulmonary follow up to assess need for CT chest/bronch.      < from: CT Neck Soft Tissue w/ IV Cont (07.09.19 @ 20:51) >  EXAM:  CT NECK SOFT TISSUE IC        PROCEDURE DATE:  07/09/2019      INTERPRETATION:  Clinical history/reason for exam: Upper airway   obstruction.    Technique:  A CT examination of the neck was performed after the   intravenous administration of 100 cc of nonionic contrast material   (Optiray 320).  Multiple axial sections were obtained from the midorbit   to the sternoclavicular notch.      Comparison:  None available    Findings:      The aerodigestive structures appear within normal limits. The orbits are   unremarkable. There is no pathologic adenopathy.      The parotid and submandibular glands appear within normal limits. The   thyroid gland is unremarkable.    Limited evaluation of the visualized portions of brain parenchyma   demonstrates no gross abnormality. There are degenerative changes of the   cervical spine.    Partially visualized bilateral pleural effusions are seen. Low density   opacity noted in left lower lobe bronchi as was seen on recent chest CT   of 6/30/2018. Interval development of postobstructive atelectasis.    Impression:    Density in a left lower lobe bronchus as seen on prior CT chest. Interval   development of postobstructive atelectasis. This may represent mucous   plugging. Bilateral pleural effusions, partially imaged. Consider further   evaluation with chest CT and/or bronchoscopy.    ROSHAN VARELA M.D., ATTENDING RADIOLOGIST  This document has been electronically signed. Jul 10 2019  9:12AM

## 2019-07-10 NOTE — SWALLOW BEDSIDE ASSESSMENT ADULT - PHARYNGEAL PHASE
Decreased laryngeal elevation/Delayed pharyngeal swallow/Throat clear post oral intake/suspected pharyngeal dysphagia
Within functional limits

## 2019-07-11 LAB
ANION GAP SERPL CALC-SCNC: 7 MMOL/L — SIGNIFICANT CHANGE UP (ref 7–14)
BASOPHILS # BLD AUTO: 0.01 K/UL — SIGNIFICANT CHANGE UP (ref 0–0.2)
BASOPHILS NFR BLD AUTO: 0.2 % — SIGNIFICANT CHANGE UP (ref 0–1)
BUN SERPL-MCNC: 8 MG/DL — LOW (ref 10–20)
CALCIUM SERPL-MCNC: 9.6 MG/DL — SIGNIFICANT CHANGE UP (ref 8.5–10.1)
CHLORIDE SERPL-SCNC: 107 MMOL/L — SIGNIFICANT CHANGE UP (ref 98–110)
CO2 SERPL-SCNC: 29 MMOL/L — SIGNIFICANT CHANGE UP (ref 17–32)
CREAT SERPL-MCNC: 0.7 MG/DL — SIGNIFICANT CHANGE UP (ref 0.7–1.5)
EOSINOPHIL # BLD AUTO: 0 K/UL — SIGNIFICANT CHANGE UP (ref 0–0.7)
EOSINOPHIL NFR BLD AUTO: 0 % — SIGNIFICANT CHANGE UP (ref 0–8)
GLUCOSE BLDC GLUCOMTR-MCNC: 111 MG/DL — HIGH (ref 70–99)
GLUCOSE BLDC GLUCOMTR-MCNC: 88 MG/DL — SIGNIFICANT CHANGE UP (ref 70–99)
GLUCOSE BLDC GLUCOMTR-MCNC: 98 MG/DL — SIGNIFICANT CHANGE UP (ref 70–99)
GLUCOSE SERPL-MCNC: 90 MG/DL — SIGNIFICANT CHANGE UP (ref 70–99)
HCT VFR BLD CALC: 39 % — SIGNIFICANT CHANGE UP (ref 37–47)
HGB BLD-MCNC: 12.2 G/DL — SIGNIFICANT CHANGE UP (ref 12–16)
IMM GRANULOCYTES NFR BLD AUTO: 0.6 % — HIGH (ref 0.1–0.3)
LYMPHOCYTES # BLD AUTO: 1.22 K/UL — SIGNIFICANT CHANGE UP (ref 1.2–3.4)
LYMPHOCYTES # BLD AUTO: 25.5 % — SIGNIFICANT CHANGE UP (ref 20.5–51.1)
MAGNESIUM SERPL-MCNC: 1.7 MG/DL — LOW (ref 1.8–2.4)
MCHC RBC-ENTMCNC: 29.5 PG — SIGNIFICANT CHANGE UP (ref 27–31)
MCHC RBC-ENTMCNC: 31.3 G/DL — LOW (ref 32–37)
MCV RBC AUTO: 94.4 FL — SIGNIFICANT CHANGE UP (ref 81–99)
MONOCYTES # BLD AUTO: 0.28 K/UL — SIGNIFICANT CHANGE UP (ref 0.1–0.6)
MONOCYTES NFR BLD AUTO: 5.9 % — SIGNIFICANT CHANGE UP (ref 1.7–9.3)
NEUTROPHILS # BLD AUTO: 3.24 K/UL — SIGNIFICANT CHANGE UP (ref 1.4–6.5)
NEUTROPHILS NFR BLD AUTO: 67.8 % — SIGNIFICANT CHANGE UP (ref 42.2–75.2)
NRBC # BLD: 0 /100 WBCS — SIGNIFICANT CHANGE UP (ref 0–0)
PHOSPHATE SERPL-MCNC: 3.6 MG/DL — SIGNIFICANT CHANGE UP (ref 2.1–4.9)
PLATELET # BLD AUTO: 178 K/UL — SIGNIFICANT CHANGE UP (ref 130–400)
POTASSIUM SERPL-MCNC: 5.4 MMOL/L — HIGH (ref 3.5–5)
POTASSIUM SERPL-SCNC: 5.4 MMOL/L — HIGH (ref 3.5–5)
RBC # BLD: 4.13 M/UL — LOW (ref 4.2–5.4)
RBC # FLD: 14.6 % — HIGH (ref 11.5–14.5)
SODIUM SERPL-SCNC: 143 MMOL/L — SIGNIFICANT CHANGE UP (ref 135–146)
WBC # BLD: 4.78 K/UL — LOW (ref 4.8–10.8)
WBC # FLD AUTO: 4.78 K/UL — LOW (ref 4.8–10.8)

## 2019-07-11 PROCEDURE — 99233 SBSQ HOSP IP/OBS HIGH 50: CPT

## 2019-07-11 PROCEDURE — 71045 X-RAY EXAM CHEST 1 VIEW: CPT | Mod: 26

## 2019-07-11 RX ORDER — SODIUM POLYSTYRENE SULFONATE 4.1 MEQ/G
30 POWDER, FOR SUSPENSION ORAL ONCE
Refills: 0 | Status: COMPLETED | OUTPATIENT
Start: 2019-07-11 | End: 2019-07-11

## 2019-07-11 RX ORDER — BACITRACIN ZINC 500 UNIT/G
1 OINTMENT IN PACKET (EA) TOPICAL
Refills: 0 | Status: DISCONTINUED | OUTPATIENT
Start: 2019-07-11 | End: 2019-07-13

## 2019-07-11 RX ORDER — MAGNESIUM SULFATE 500 MG/ML
2 VIAL (ML) INJECTION ONCE
Refills: 0 | Status: COMPLETED | OUTPATIENT
Start: 2019-07-11 | End: 2019-07-11

## 2019-07-11 RX ORDER — IPRATROPIUM/ALBUTEROL SULFATE 18-103MCG
3 AEROSOL WITH ADAPTER (GRAM) INHALATION EVERY 8 HOURS
Refills: 0 | Status: DISCONTINUED | OUTPATIENT
Start: 2019-07-11 | End: 2019-07-13

## 2019-07-11 RX ADMIN — Medication 1 TABLET(S): at 11:13

## 2019-07-11 RX ADMIN — Medication 50 GRAM(S): at 15:18

## 2019-07-11 RX ADMIN — MEGESTROL ACETATE 400 MILLIGRAM(S): 40 SUSPENSION ORAL at 17:04

## 2019-07-11 RX ADMIN — Medication 1 PATCH: at 00:30

## 2019-07-11 RX ADMIN — Medication 1 APPLICATION(S): at 22:32

## 2019-07-11 RX ADMIN — ATORVASTATIN CALCIUM 10 MILLIGRAM(S): 80 TABLET, FILM COATED ORAL at 22:31

## 2019-07-11 RX ADMIN — MEGESTROL ACETATE 400 MILLIGRAM(S): 40 SUSPENSION ORAL at 07:00

## 2019-07-11 RX ADMIN — CHLORHEXIDINE GLUCONATE 1 APPLICATION(S): 213 SOLUTION TOPICAL at 06:23

## 2019-07-11 RX ADMIN — Medication 3 MILLILITER(S): at 16:54

## 2019-07-11 RX ADMIN — Medication 3 MILLILITER(S): at 07:51

## 2019-07-11 RX ADMIN — Medication 1 PATCH: at 11:14

## 2019-07-11 RX ADMIN — TIOTROPIUM BROMIDE 1 CAPSULE(S): 18 CAPSULE ORAL; RESPIRATORY (INHALATION) at 07:51

## 2019-07-11 RX ADMIN — SODIUM POLYSTYRENE SULFONATE 30 GRAM(S): 4.1 POWDER, FOR SUSPENSION ORAL at 10:29

## 2019-07-11 RX ADMIN — HEPARIN SODIUM 5000 UNIT(S): 5000 INJECTION INTRAVENOUS; SUBCUTANEOUS at 17:04

## 2019-07-11 RX ADMIN — HEPARIN SODIUM 5000 UNIT(S): 5000 INJECTION INTRAVENOUS; SUBCUTANEOUS at 06:23

## 2019-07-11 RX ADMIN — SENNA PLUS 2 TABLET(S): 8.6 TABLET ORAL at 22:31

## 2019-07-11 RX ADMIN — Medication 1 PATCH: at 19:30

## 2019-07-11 RX ADMIN — AMLODIPINE BESYLATE 5 MILLIGRAM(S): 2.5 TABLET ORAL at 06:23

## 2019-07-11 NOTE — PROGRESS NOTE ADULT - SUBJECTIVE AND OBJECTIVE BOX
HPI  Patient is a 86y old Female who presents with a chief complaint of Fever since yesterday (10 Jul 2019 18:37)    Currently admitted to medicine with the primary diagnosis of Pneumonia     Today is hospital day 16d.     INTERVAL HPI / OVERNIGHT EVENTS:  Patient was examined and seen at bedside. This morning she is resting comfortably in bed without oxygen and no grunting noises.  Patient has a primafit on, with good urine output. The patient was alert, nodded and said "hello", More alert today. Answered "[id number]" when asked what the year was. Answered "in a home" when asked if she knew where she was. Answered "no pain" when asked if something was hurting. The patient further denied chest pain, shortness of breath, or chest tightness. Patient has a lesion on the nose from the BIPAP. No BM reported.     ROS: Otherwise unremarkable     PAST MEDICAL & SURGICAL HISTORY  HLD (hyperlipidemia)  Diastolic dysfunction  HTN (hypertension)  History of cholecystectomy  H/O bilateral hip replacements: Right Hip ORIF on Xray    ALLERGIES  No Known Allergies    MEDICATIONS  STANDING MEDICATIONS  ALBUTerol/ipratropium for Nebulization 3 milliLiter(s) Nebulizer every 8 hours  amLODIPine   Tablet 5 milliGRAM(s) Oral daily  atorvastatin 10 milliGRAM(s) Oral at bedtime  chlorhexidine 4% Liquid 1 Application(s) Topical <User Schedule>  heparin  Injectable 5000 Unit(s) SubCutaneous every 12 hours  megestrol Suspension 400 milliGRAM(s) Oral <User Schedule>  multivitamin/minerals 1 Tablet(s) Oral daily  nitroglycerin    Patch 0.1 mG/Hr(s) 1 patch Transdermal daily  senna 2 Tablet(s) Oral at bedtime    PRN MEDICATIONS  acetaminophen   Tablet .. 650 milliGRAM(s) Oral every 6 hours PRN  dextrose 50% Injectable 50 milliLiter(s) IV Push once PRN  polyethylene glycol 3350 17 Gram(s) Oral daily PRN    VITALS:  T(F): 96.3  HR: 80  BP: 176/96  RR: 17  SpO2: 92%    PHYSICAL EXAM  GEN: NAD, Resting comfortably in bed on room air  PULM: Clear to auscultation bilaterally, No wheezes.   CVS: Regular rate and rhythm, S1-S2, audible systolic murmur appreciated left parasternal  ABD: Soft, non-tender, non-distended, no guarding  EXT: No edema  NEURO: AAOx1, says her name. Answered "[birth year]33" to the year and "in a home" to the location and "no pain" if anything hurt    LABS                        12.2   4.78  )-----------( 178      ( 11 Jul 2019 07:09 )             39.0     07-11    143  |  107  |  8<L>  ----------------------------<  90  5.4<H>   |  29  |  0.7    Ca    9.6      11 Jul 2019 07:09  Phos  3.6     07-11  Mg     1.7     07-11    ABG - ( 10 Jul 2019 08:29 )  pH, Arterial: 7.44  pH, Blood: x     /  pCO2: 44    /  pO2: 66    / HCO3: 29    / Base Excess: 4.4   /  SaO2: 94        < from: Xray Chest 1 View- PORTABLE-Urgent (07.11.19 @ 09:39) >  Impression:      Basilar opacificationsand effusions with cardiomegaly without difference        < end of copied text >

## 2019-07-11 NOTE — CONSULT NOTE ADULT - SUBJECTIVE AND OBJECTIVE BOX
Patient is a 86y old  Female who presents with a chief complaint of Fever since yesterday (11 Jul 2019 13:46)    HPI:  86 y/f with PMH of DLD, HTN, recent hospitalization for pneumonia presents here from home with reported history of fever since yesterday. She is unable to provide any history, no family at bedside, couldnt reach Daughter Waleska Ocasio at this time inspite of trying multiple times, history based on ED chart. She is reported to have she was having fever x 1 day, she also seemed to be confused this AM, was given Bactrim which was left over from her prior infection but she threw up after taking it.   In ED she was hypotensive to SBP 97, with elevated lactate. She was given 3400 ml of LR, after which her BP improved and lactate decreased. (25 Jun 2019 15:06)      PAST MEDICAL & SURGICAL HISTORY:  HLD (hyperlipidemia)  Diastolic dysfunction  HTN (hypertension)  History of cholecystectomy  H/O bilateral hip replacements: Right Hip ORIF on Xray      Hospital Course: Hypercapnic Encephalopathy- improves w/ BIPAP  - Clinically improving  - on room air as of this morning.   - Continue monitoring pulse ox  - Started on megace for appetite .   - Dietician recommended dysphagia 3 with ensure pudding TID + Multivitmain w/ mineral  - Successfully weaned off BIPAP on 7/3. Continuing PRN  - EEG showed no seizure activity  - MR brain revealed chronic microvascular changes and chronic lacunar infarctions  - CXR shows basilar effusions with no change 7/11  - Pulmonary consult recommended weaning off BIPAP, albuterol nebulizers, IV steroids, outpatient PFT's, and GOC discussion  - Palliative care consulted - no intervention at this time as patient is improving    #End expiratory grunting noise - upper airway issue?  - ENT recommended CT. CT showed a postobstructive atelectasis likely representing a mucus plug, with partially imaged bilateral pleural effusions.  - Patient refused a scope  - Chest PT  - duonebs    #No recent BM  - Senna/Colace    #Limited PO Intake  - Megestrol 400 suspension BID    #HTN / Hyperkalemia  - Enalapril discontinued due to rising potassium (5.1)  - Switched to norvasc  - Given Kayexalate 30    #Hypernatremia - resolved  - Sodium, Serum: 143 mmol/L (07.11.19)   - Changing fluids to 1/2NS @60ml/hr  - BMP in the morning  - repeating Mg    #Hypoglycemia?  - PO Challenge by speech pathology daily   - IVF?  - Fingersticks 2/day  - Underlying neoplasm (i.e. insulinoma)?  - Infuse dextrose if <80   - Cortisol AM, Serum: 11.8 ug/dL (06.27.19 @ 21:16) WNL    #Acute urinary retention  - seen on renal/bladder US.  - Funez inserted  - monitor I&O    #KATINA  - Urinary retention related, resolved      TODAY'S SUBJECTIVE & REVIEW OF SYMPTOMS:     Constitutional Weakness   Cardio WNL   Resp WNL   GI WNL  Heme WNL  Endo WNL  Skin WNL  MSK WNL  Neuro Confusion  Cognitive WNL  Psych WNL  + incontinence    MEDICATIONS  (STANDING):  ALBUTerol/ipratropium for Nebulization 3 milliLiter(s) Nebulizer every 8 hours  amLODIPine   Tablet 5 milliGRAM(s) Oral daily  atorvastatin 10 milliGRAM(s) Oral at bedtime  chlorhexidine 4% Liquid 1 Application(s) Topical <User Schedule>  heparin  Injectable 5000 Unit(s) SubCutaneous every 12 hours  megestrol Suspension 400 milliGRAM(s) Oral <User Schedule>  multivitamin/minerals 1 Tablet(s) Oral daily  nitroglycerin    Patch 0.1 mG/Hr(s) 1 patch Transdermal daily  senna 2 Tablet(s) Oral at bedtime    MEDICATIONS  (PRN):  acetaminophen   Tablet .. 650 milliGRAM(s) Oral every 6 hours PRN Temp greater or equal to 38C (100.4F)  dextrose 50% Injectable 50 milliLiter(s) IV Push once PRN If Blood glucose is LESS than 70  polyethylene glycol 3350 17 Gram(s) Oral daily PRN Constipation      FAMILY HISTORY:  No pertinent family history in first degree relatives      Allergies    No Known Allergies    Intolerances        SOCIAL HISTORY:    [    ] Etoh  [    ] Smoking  [    ] Substance abuse     Home Environment:  [  x  ] Home Alone  [   x ] Lives with Family  [  x  ] Home Health Aid 8x5    Dwelling:  [ x   ] Apartment  [   x ] Private House  [    ] Adult Home  [    ] Skilled Nursing Facility      [    ] Short Term  [    ] Long Term  [  x  ] Stairs                           [    ] Elevator     FUNCTIONAL STATUS PTA: (Check all that apply)  Ambulation: [     ]Independent    [ x   ] Dependent     [    ] Non-Ambulatory  Assistive Device: [    ] SA Cane  [    ]  Q Cane  [  x  ] Walker  [    ]  Wheelchair  ADL : [    ] Independent  [ x   ]  Dependent       Vital Signs Last 24 Hrs  T(C): 35.7 (11 Jul 2019 05:59), Max: 36.4 (10 Jul 2019 21:10)  T(F): 96.3 (11 Jul 2019 05:59), Max: 97.6 (10 Jul 2019 21:10)  HR: 80 (11 Jul 2019 05:59) (80 - 89)  BP: 176/96 (11 Jul 2019 05:59) (142/63 - 176/96)  BP(mean): --  RR: 17 (11 Jul 2019 05:59) (17 - 18)  SpO2: 92% (10 Jul 2019 20:00) (92% - 92%)      PHYSICAL EXAM: Alert follows commands confused on O2 Resp Tx  GENERAL: NAD, well-groomed, well-developed  HEAD:  Atraumatic, Normocephalic  EYES: EOMI, PERRLA, conjunctiva and sclera clear  NECK: Supple  CHEST/LUNG: Clear bilaterally  HEART: Regular rate and rhythm  ABDOMEN: Soft, Nontender, Nondistended; Bowel sounds present  EXTREMITIES:  no calf tenderness,no edema BLES    NERVOUS SYSTEM:  Cranial Nerves 2-12 intact [ x   ] Abnormal  [    ]  ROM: WFL all extremities [    x]  Abnormal [     ]  Motor Strength: WFL all extremities  [ x   ]  Abnormal [    ]3-4/5  Sensation: intact to light touch [  x  ] Abnormal [    ]      FUNCTIONAL STATUS:  Bed Mobility: [   ]  Independent [    ]  Supervision [  x  ]  Needs Assistance [  ]  N/A  Transfers: [    ]  Independent [    ]  Supervision [x    ]  Needs Assistance [    ]  N/A    Ambulation:  [    ]  Independent [    ]  Supervision [  x  ]  Needs Assistance [    ]  N/A   ADL:  [    ]   Independent [ x   ] Requires Assistance [    ] N/A       LABS:                        12.2   4.78  )-----------( 178      ( 11 Jul 2019 07:09 )             39.0     07-11    143  |  107  |  8<L>  ----------------------------<  90  5.4<H>   |  29  |  0.7    Ca    9.6      11 Jul 2019 07:09  Phos  3.6     07-11  Mg     1.7     07-11            RADIOLOGY & ADDITIONAL STUDIES:

## 2019-07-11 NOTE — CONSULT NOTE ADULT - ASSESSMENT
IMPRESSION: Rehab of Debilitation /encephalopathy/ dementia    PRECAUTIONS: [ x   ] Cardiac  [  x  ] Respiratory  [    ] Seizures [    ] Contact Isolation  [    ] Droplet Isolation  [    ] Other    Weight Bearing Status:     RECOMMENDATION:    Out of Bed to Chair     DVT/Decubiti Prophylaxis    REHAB PLAN:     [   x  ] Bedside P/T 3-5 times a week   [     ] Bedside O/T  2-3 times a week   [     ] No Rehab Therapy Indicated   [     ]  Speech Therapy   Conditioning/ROM                                 ADL  Bed Mobility                                            Conditioning/ROM  Transfers                                                  Bed Mobility  Sitting /Standing Balance                      Transfers                                        Gait Training                                            Sitting/Standing Balance  Stair Training [   ]Applicable                 Home equipment Eval                                                                     Splinting  [   ] Only      GOALS:   ADL   [   x ]   Independent         Transfers  [   x ] Independent            Ambulation  [  x   ] Independent     [   x  ] With device                            [    ]  CG                                               [    ]  CG                                                    [     ] CG                            [    ] Min A                                          [    ] Min A                                                [     ] Min  A          DISCHARGE PLAN:   [     ]  Good candidate for Intensive Rehabilitation/Hospital based                                             Will tolerate 3hrs Intensive Rehab Daily                                       [ x     ]  Short Term Rehab in Skilled Nursing Facility                                       [      ]  Home with Outpatient or  services                                         [      ]  Possible Candidate for Intensive Hospital based Rehab

## 2019-07-11 NOTE — PROGRESS NOTE ADULT - ASSESSMENT
Assessment:  87 y/o F w/ PMH of DLD, HTN, CHF, and recent hospitalization of PNA presented from home w/ fever and AMS. Patient is non-verbal.    #Hypercapnic Encephalopathy- improves w/ BIPAP  - Clinically improving  - on room air as of this morning.   - Continue monitoring pulse ox  - Started on megace for appetite .   - Dietician recommended dysphagia 3 with ensure pudding TID + Multivitmain w/ mineral  - PT/ Rehab consult f/u  - Successfully weaned off BIPAP on 7/3. Continuing PRN  - EEG showed no seizure activity  - MR brain revealed chronic microvascular changes and chronic lacunar infarctions  - CXR shows basilar effusions with no change 7/11  - Pulmonary consult recommended weaning off BIPAP, albuterol nebulizers, IV steroids, outpatient PFT's, and GOC discussion  - Palliative care consulted - no intervention at this time as patient is improving    #End expiratory grunting noise - upper airway issue?  - ENT recommended CT. CT showed a postobstructive atelectasis likely representing a mucus plug, with partially imaged bilateral pleural effusions.  - Patient refused a scope  - Chest PT  - duonebs    #No recent BM  - Senna/Colace    #Limited PO Intake  - Megestrol 400 suspension BID    #HTN / Hyperkalemia  - Enalapril discontinued due to rising potassium (5.1)  - Switched to norvasc  - Given Kayexalate 30    #Hypernatremia - resolved  - Sodium, Serum: 143 mmol/L (07.11.19)   - Changing fluids to 1/2NS @60ml/hr  - BMP in the morning  - repeating Mg    #Hypoglycemia?  - PO Challenge by speech pathology daily   - IVF?  - Fingersticks 2/day  - Underlying neoplasm (i.e. insulinoma)?  - Infuse dextrose if <80   - Cortisol AM, Serum: 11.8 ug/dL (06.27.19 @ 21:16) WNL    #Acute urinary retention  - seen on renal/bladder US.  - Funez inserted  - monitor I&O    #KATINA  - Urinary retention related, resolved    DNR/DNI    Placement?

## 2019-07-12 LAB
ANION GAP SERPL CALC-SCNC: 10 MMOL/L — SIGNIFICANT CHANGE UP (ref 7–14)
BASOPHILS # BLD AUTO: 0.01 K/UL — SIGNIFICANT CHANGE UP (ref 0–0.2)
BASOPHILS NFR BLD AUTO: 0.2 % — SIGNIFICANT CHANGE UP (ref 0–1)
BUN SERPL-MCNC: 11 MG/DL — SIGNIFICANT CHANGE UP (ref 10–20)
CALCIUM SERPL-MCNC: 8.8 MG/DL — SIGNIFICANT CHANGE UP (ref 8.5–10.1)
CHLORIDE SERPL-SCNC: 103 MMOL/L — SIGNIFICANT CHANGE UP (ref 98–110)
CO2 SERPL-SCNC: 28 MMOL/L — SIGNIFICANT CHANGE UP (ref 17–32)
CREAT SERPL-MCNC: 0.8 MG/DL — SIGNIFICANT CHANGE UP (ref 0.7–1.5)
EOSINOPHIL # BLD AUTO: 0 K/UL — SIGNIFICANT CHANGE UP (ref 0–0.7)
EOSINOPHIL NFR BLD AUTO: 0 % — SIGNIFICANT CHANGE UP (ref 0–8)
GLUCOSE BLDC GLUCOMTR-MCNC: 115 MG/DL — HIGH (ref 70–99)
GLUCOSE BLDC GLUCOMTR-MCNC: 86 MG/DL — SIGNIFICANT CHANGE UP (ref 70–99)
GLUCOSE BLDC GLUCOMTR-MCNC: 90 MG/DL — SIGNIFICANT CHANGE UP (ref 70–99)
GLUCOSE SERPL-MCNC: 115 MG/DL — HIGH (ref 70–99)
HCT VFR BLD CALC: 34 % — LOW (ref 37–47)
HGB BLD-MCNC: 10.6 G/DL — LOW (ref 12–16)
IMM GRANULOCYTES NFR BLD AUTO: 0.2 % — SIGNIFICANT CHANGE UP (ref 0.1–0.3)
LYMPHOCYTES # BLD AUTO: 1.75 K/UL — SIGNIFICANT CHANGE UP (ref 1.2–3.4)
LYMPHOCYTES # BLD AUTO: 35.1 % — SIGNIFICANT CHANGE UP (ref 20.5–51.1)
MAGNESIUM SERPL-MCNC: 2 MG/DL — SIGNIFICANT CHANGE UP (ref 1.8–2.4)
MCHC RBC-ENTMCNC: 29.5 PG — SIGNIFICANT CHANGE UP (ref 27–31)
MCHC RBC-ENTMCNC: 31.2 G/DL — LOW (ref 32–37)
MCV RBC AUTO: 94.7 FL — SIGNIFICANT CHANGE UP (ref 81–99)
MONOCYTES # BLD AUTO: 0.32 K/UL — SIGNIFICANT CHANGE UP (ref 0.1–0.6)
MONOCYTES NFR BLD AUTO: 6.4 % — SIGNIFICANT CHANGE UP (ref 1.7–9.3)
NEUTROPHILS # BLD AUTO: 2.9 K/UL — SIGNIFICANT CHANGE UP (ref 1.4–6.5)
NEUTROPHILS NFR BLD AUTO: 58.1 % — SIGNIFICANT CHANGE UP (ref 42.2–75.2)
NRBC # BLD: 0 /100 WBCS — SIGNIFICANT CHANGE UP (ref 0–0)
PHOSPHATE SERPL-MCNC: 3.5 MG/DL — SIGNIFICANT CHANGE UP (ref 2.1–4.9)
PLATELET # BLD AUTO: 175 K/UL — SIGNIFICANT CHANGE UP (ref 130–400)
POTASSIUM SERPL-MCNC: 4.9 MMOL/L — SIGNIFICANT CHANGE UP (ref 3.5–5)
POTASSIUM SERPL-SCNC: 4.9 MMOL/L — SIGNIFICANT CHANGE UP (ref 3.5–5)
RBC # BLD: 3.59 M/UL — LOW (ref 4.2–5.4)
RBC # FLD: 14.4 % — SIGNIFICANT CHANGE UP (ref 11.5–14.5)
SODIUM SERPL-SCNC: 141 MMOL/L — SIGNIFICANT CHANGE UP (ref 135–146)
WBC # BLD: 4.99 K/UL — SIGNIFICANT CHANGE UP (ref 4.8–10.8)
WBC # FLD AUTO: 4.99 K/UL — SIGNIFICANT CHANGE UP (ref 4.8–10.8)

## 2019-07-12 PROCEDURE — 99233 SBSQ HOSP IP/OBS HIGH 50: CPT

## 2019-07-12 RX ADMIN — ATORVASTATIN CALCIUM 10 MILLIGRAM(S): 80 TABLET, FILM COATED ORAL at 21:59

## 2019-07-12 RX ADMIN — Medication 1 APPLICATION(S): at 17:11

## 2019-07-12 RX ADMIN — AMLODIPINE BESYLATE 5 MILLIGRAM(S): 2.5 TABLET ORAL at 05:43

## 2019-07-12 RX ADMIN — Medication 1 APPLICATION(S): at 05:44

## 2019-07-12 RX ADMIN — Medication 3 MILLILITER(S): at 07:40

## 2019-07-12 RX ADMIN — SENNA PLUS 2 TABLET(S): 8.6 TABLET ORAL at 21:59

## 2019-07-12 RX ADMIN — MEGESTROL ACETATE 400 MILLIGRAM(S): 40 SUSPENSION ORAL at 05:43

## 2019-07-12 RX ADMIN — HEPARIN SODIUM 5000 UNIT(S): 5000 INJECTION INTRAVENOUS; SUBCUTANEOUS at 17:11

## 2019-07-12 RX ADMIN — MEGESTROL ACETATE 400 MILLIGRAM(S): 40 SUSPENSION ORAL at 17:11

## 2019-07-12 RX ADMIN — CHLORHEXIDINE GLUCONATE 1 APPLICATION(S): 213 SOLUTION TOPICAL at 05:44

## 2019-07-12 RX ADMIN — Medication 1 TABLET(S): at 11:23

## 2019-07-12 RX ADMIN — HEPARIN SODIUM 5000 UNIT(S): 5000 INJECTION INTRAVENOUS; SUBCUTANEOUS at 05:44

## 2019-07-12 NOTE — PROGRESS NOTE ADULT - SUBJECTIVE AND OBJECTIVE BOX
KYRA MENDIOLA  [facility] T2-3A 019 B ([facility] T2-3A)            Patient was evaluated and examined  by bedside, remains confused, awake, no dyspnea at rest, encouraged to eat food.           REVIEW OF SYSTEMS:  unable to attend due to dementia with chronic confusion state      T(C): , Max: 37.1 (07-11-19 @ 21:00)  HR: 91 (07-12-19 @ 14:21)  BP: 106/56 (07-12-19 @ 14:21)  RR: 20 (07-12-19 @ 14:21)  SpO2: 91% (07-12-19 @ 07:29)  CAPILLARY BLOOD GLUCOSE      POCT Blood Glucose.: 90 mg/dL (12 Jul 2019 07:49)  POCT Blood Glucose.: 86 mg/dL (12 Jul 2019 03:47)  POCT Blood Glucose.: 98 mg/dL (11 Jul 2019 21:23)  POCT Blood Glucose.: 111 mg/dL (11 Jul 2019 16:35)      PHYSICAL EXAM:  General: NAD, Awake, confused, patient is laying comfortably in bed  HEENT: AT, NC, Supple, NO JVD, NO CB  Lungs: decreased breath sounds B/L, no wheezing, no rhonchi  CVS: normal S1, S2, RRR, NO M/G/R  Abdomen: soft, bowel sounds present, non-tender, non-distended  Extremities: no edema, no clubbing, no cyanosis, positive peripheral pulses b/l  Neuro: no acute focal neurological deficits, generalized body weakness, gait not tested  Skin: healing skin abrasion on nasal bridge       LAB  CBC  Date: 07-12-19 @ 06:18  Mean cell Ssaeugvuuz12.5  Mean cell Hemoglobin Conc31.2  Mean cell Volum 94.7  Platelet count-Automate 175  RBC Count 3.59  Red Cell Distrib Width14.4  WBC Count4.99  % Albumin, Urine--  Hematocrit 34.0  Hemoglobin 10.6  CBC  Date: 07-11-19 @ 07:09  Mean cell Otoswksxpq72.5  Mean cell Hemoglobin Conc31.3  Mean cell Volum 94.4  Platelet count-Automate 178  RBC Count 4.13  Red Cell Distrib Width14.6  WBC Count4.78  % Albumin, Urine--  Hematocrit 39.0  Hemoglobin 12.2  CBC  Date: 07-10-19 @ 05:35  Mean cell Jmqyhojstn21.2  Mean cell Hemoglobin Conc31.0  Mean cell Volum 94.4  Platelet count-Automate 168  RBC Count 3.90  Red Cell Distrib Width14.6  WBC Count4.34  % Albumin, Urine--  Hematocrit 36.8  Hemoglobin 11.4  CBC  Date: 07-09-19 @ 11:39  Mean cell Ywrlfmauhi69.9  Mean cell Hemoglobin Conc30.4  Mean cell Volum 95.0  Platelet count-Automate 159  RBC Count 4.01  Red Cell Distrib Width14.7  WBC Count4.69  % Albumin, Urine--  Hematocrit 38.1  Hemoglobin 11.6  CBC  Date: 07-07-19 @ 06:12  Mean cell Aozerylfka75.5  Mean cell Hemoglobin Conc31.1  Mean cell Volum 94.8  Platelet count-Automate 177  RBC Count 4.04  Red Cell Distrib Width14.7  WBC Count6.07  % Albumin, Urine--  Hematocrit 38.3  Hemoglobin 11.9  CBC  Date: 07-06-19 @ 07:07  Mean cell Wtshpzbnyh26.4  Mean cell Hemoglobin Conc31.4  Mean cell Volum 93.7  Platelet count-Automate 183  RBC Count 3.98  Red Cell Distrib Width14.6  WBC Count5.36  % Albumin, Urine--  Hematocrit 37.3  Hemoglobin 11.7    [facility]  07-12-19 @ 06:18  Blood Gas Arterial-Calcium,Ionized--  Blood Urea Nitrogen, Serum 11 mg/dL [10 - 20]  Carbon Dioxide, Serum28 mmol/L [17 - 32]  Chloride, Bgbkc323 mmol/L [98 - 110]  Creatinie, Serum0.8 mg/dL [0.7 - 1.5]  Glucose, Kyewz973 mg/dL<H> [70 - 99]  Potassium, Serum4.9 mmol/L [3.5 - 5.0]  Sodium, Serum 141 mmol/L [135 - 146]  [facility]  07-11-19 @ 07:09  Blood Gas Arterial-Calcium,Ionized--  Blood Urea Nitrogen, Serum 8 mg/dL<L> [10 - 20]  Carbon Dioxide, Serum29 mmol/L [17 - 32]  Chloride, Soovh726 mmol/L [98 - 110]  Creatinie, Serum0.7 mg/dL [0.7 - 1.5]  Glucose, Serum90 mg/dL [70 - 99]  Potassium, Serum5.4 mmol/L<H> [3.5 - 5.0]  Sodium, Serum 143 mmol/L [135 - 146]  [facility]  07-10-19 @ 08:29  Blood Gas Arterial-Calcium,Ionized1.26 mmoL/L [1.12 - 1.30]  Blood Urea Nitrogen, Serum --  Carbon Dioxide, Serum--  Chloride, Serum--  Creatinie, Serum--  Glucose, Serum--  Potassium, Serum--  Sodium, Serum --  BMP  07-10-19 @ 05:35  Blood Gas Arterial-Calcium,Ionized--  Blood Urea Nitrogen, Serum 6 mg/dL<L> [10 - 20]  Carbon Dioxide, Serum26 mmol/L [17 - 32]  Chloride, Rmkxm267 mmol/L [98 - 110]  Creatinie, Serum0.6 mg/dL<L> [0.7 - 1.5]  Glucose, Serum81 mg/dL [70 - 99]  Potassium, Serum5.1 mmol/L<H> [3.5 - 5.0]  Sodium, Serum 142 mmol/L [135 - 146]  [facility]  07-09-19 @ 11:39  Blood Gas Arterial-Calcium,Ionized--  Blood Urea Nitrogen, Serum 5 mg/dL<L> [10 - 20]  Carbon Dioxide, Serum27 mmol/L [17 - 32]  Chloride, Ddvnw807 mmol/L [98 - 110]  Creatinie, Serum0.6 mg/dL<L> [0.7 - 1.5]  Glucose, Serum92 mg/dL [70 - 99]  Potassium, Serum4.7 mmol/L [3.5 - 5.0]  Sodium, Serum 144 mmol/L [135 - 146]              Microbiology:    Culture - Urine (collected 06-25-19 @ 11:23)  Source: .Urine Catheterized  Final Report (06-26-19 @ 18:11):    No growth    Culture - Blood (collected 06-25-19 @ 10:00)  Source: .Blood Blood-Peripheral  Final Report (06-30-19 @ 17:01):    No growth at 5 days.    Culture - Blood (collected 06-25-19 @ 10:00)  Source: .Blood Blood-Peripheral  Final Report (06-30-19 @ 17:01):    No growth at 5 days.          Medications:  acetaminophen   Tablet .. 650 milliGRAM(s) Oral every 6 hours PRN  ALBUTerol/ipratropium for Nebulization 3 milliLiter(s) Nebulizer every 8 hours  amLODIPine   Tablet 5 milliGRAM(s) Oral daily  atorvastatin 10 milliGRAM(s) Oral at bedtime  BACItracin   Ointment 1 Application(s) Topical two times a day  chlorhexidine 4% Liquid 1 Application(s) Topical <User Schedule>  dextrose 50% Injectable 50 milliLiter(s) IV Push once PRN  heparin  Injectable 5000 Unit(s) SubCutaneous every 12 hours  megestrol Suspension 400 milliGRAM(s) Oral <User Schedule>  multivitamin/minerals 1 Tablet(s) Oral daily  polyethylene glycol 3350 17 Gram(s) Oral daily PRN  senna 2 Tablet(s) Oral at bedtime        Assessment and Plan:  - Acute hypoxic respiratory failure with h/o COPD - clinicaly patient has improved, off bipap tx. now  -continue close pulse ox monitoring  -b/l lower lobes atelectasis- mucus plugging - chest PT    -poor appetite- started on megace supplement on daily calorie count    -dementia- functions at baseline    -HTN- b/p on lower side , d/c norvasc    #Progress Note Handoff: d/c plan: SNF possibly in am tomorrow  Family discussion: yes Disposition: SNF .

## 2019-07-12 NOTE — PROGRESS NOTE ADULT - SUBJECTIVE AND OBJECTIVE BOX
HPI  Patient is a 86y old Female who presents with a chief complaint of Fever since yesterday (12 Jul 2019 15:52)    Currently admitted to medicine with the primary diagnosis of Pneumonia     Today is hospital day 17d.     INTERVAL HPI / OVERNIGHT EVENTS:  Patient was examined and seen at bedside. This morning she is resting comfortably in bed with oxygen on 2L and no grunting noises.  Patient has a primafit on, with good urine output. The patient was alert, nodded and said "hello", More alert today. Answered nonspecifically when asked what the year was. Answered "home" when asked if she knew where she was. Answered "no pain" when asked if something was hurting. The patient further denied chest pain, shortness of breath, or chest tightness. Patient has a lesion on the nose from the BIPAP. No BM reported.     ROS: Otherwise unremarkable     PAST MEDICAL & SURGICAL HISTORY  HLD (hyperlipidemia)  Diastolic dysfunction  HTN (hypertension)  History of cholecystectomy  H/O bilateral hip replacements: Right Hip ORIF on Xray    ALLERGIES  No Known Allergies    MEDICATIONS  STANDING MEDICATIONS  ALBUTerol/ipratropium for Nebulization 3 milliLiter(s) Nebulizer every 8 hours  atorvastatin 10 milliGRAM(s) Oral at bedtime  BACItracin   Ointment 1 Application(s) Topical two times a day  chlorhexidine 4% Liquid 1 Application(s) Topical <User Schedule>  heparin  Injectable 5000 Unit(s) SubCutaneous every 12 hours  megestrol Suspension 400 milliGRAM(s) Oral <User Schedule>  multivitamin/minerals 1 Tablet(s) Oral daily  senna 2 Tablet(s) Oral at bedtime    PRN MEDICATIONS  acetaminophen   Tablet .. 650 milliGRAM(s) Oral every 6 hours PRN  dextrose 50% Injectable 50 milliLiter(s) IV Push once PRN  polyethylene glycol 3350 17 Gram(s) Oral daily PRN    VITALS:  T(F): 96.8  HR: 91  BP: 106/56  RR: 20  SpO2: 91%    PHYSICAL EXAM  GEN: NAD, Resting comfortably in bed on room air  PULM: Clear to auscultation bilaterally, No wheezes.   CVS: Regular rate and rhythm, S1-S2, audible systolic murmur appreciated left parasternal  ABD: Soft, non-tender, non-distended, no guarding  EXT: No edema  NEURO: AAOx1, says her name. Answered nonspecifically to the year and "home" to the location and "no pain" if anything hurt    LABS                        10.6   4.99  )-----------( 175      ( 12 Jul 2019 06:18 )             34.0     07-12    141  |  103  |  11  ----------------------------<  115<H>  4.9   |  28  |  0.8    Ca    8.8      12 Jul 2019 06:18  Phos  3.5     07-12  Mg     2.0     07-12      RADIOLOGY  < from: Xray Chest 1 View- PORTABLE-Urgent (07.11.19 @ 09:39) >  Impression:      Basilar opacificationsand effusions with cardiomegaly without difference    < end of copied text >

## 2019-07-12 NOTE — PROGRESS NOTE ADULT - ASSESSMENT
Assessment:  85 y/o F w/ PMH of DLD, HTN, CHF, and recent hospitalization of PNA presented from home w/ fever and AMS. Patient is non-verbal.    #Hypercapnic Encephalopathy- improves w/ BIPAP  - Clinically improving  - on 2L cannula this morning  - Continue monitoring pulse ox  - Started on megace for appetite .   - Dietician recommended dysphagia 3 with ensure pudding TID + Multivitmain w/ mineral  - PT/ Rehab consult f/u  - Successfully weaned off BIPAP on 7/3. Continuing PRN  - EEG showed no seizure activity  - MR brain revealed chronic microvascular changes and chronic lacunar infarctions  - CXR shows basilar effusions with no change 7/11  - Pulmonary consult recommended weaning off BIPAP, albuterol nebulizers, IV steroids, outpatient PFT's, and GOC discussion  - Palliative care consulted - no intervention at this time as patient is improving    #End expiratory grunting noise - upper airway issue?  - ENT recommended CT. CT showed a postobstructive atelectasis likely representing a mucus plug, with partially imaged bilateral pleural effusions.  - Patient refused a scope  - Chest PT  - duonebs    #No recent BM  - Senna/Colace    #Limited PO Intake  - Calorie counting  - Megestrol 400 suspension BID    #HTN / Hyperkalemia  - Enalapril discontinued due to rising potassium (4.9 today)  - Switched to norvasc  - Given Kayexalate 30    #Hypernatremia - resolved  - Sodium, Serum: 141 mmol/L (07.12.19)   - DC Fluids for now  - BMP in the morning  - repeating Mg    #Hypoglycemia?  - PO Challenge by speech pathology daily   - Recent fingersticks POCT GLUCOSE:  POCT Blood Glucose.: 115 mg/dL <H> [70 - 99] (07-12-19)  POCT Blood Glucose.: 90 mg/dL [70 - 99] (07-12-19)  POCT Blood Glucose.: 86 mg/dL [70 - 99] (07-12-19)  - Fingersticks 2/day  - Underlying neoplasm (i.e. insulinoma)?  - Infuse dextrose if <80   - Cortisol AM, Serum: 11.8 ug/dL (06.27.19 @ 21:16) WNL    #Acute urinary retention  - seen on renal/bladder US.  - Funez inserted  - monitor I&O    #KATINA  - Urinary retention related, resolved    DNR/DNI    Possible DC this weekend

## 2019-07-12 NOTE — CHART NOTE - NSCHARTNOTEFT_GEN_A_CORE
Registered Dietitian Follow-Up     Patient Profile Reviewed                           Yes [x]   No []     Nutrition History Previously Obtained        Yes [x]  No []      Pertinent Subjective Information: 1:1 feed. Spoke w/ RN, pt continues with poor PO intake, however, is eating a little. 3day Calorie Count was due today, however, was extended x1 more day- now due 7/13. Original 3 day calorie count is in paper chart***, new 1 day calorie count still hung in pt's room.     Pertinent Medical Interventions: Family is opting for DNR/DNI- paliative signed off. Hypercapnic Encephalopathy. Was successfully weaned off BIPAP. Hypernatremia - resolved. Hypoglycemia? - PO Challenge by speech pathology daily. KATINA, urinary retention related, resolved    Diet order: Dys 2 mech soft, DASH/TLC     Anthropometrics:  - Ht. 60"  - Wt. 7/8 120# - bed scale error. Last wt 101# more likely  - %wt change  - BMI   IBW: 100#+/-10%      Pertinent Lab Data: (7/9) BUN 5, Cr 0.6, Mg 1.7  consistent with inadequate PO intake      Pertinent Meds: heparin, d5w 100ml/h, miralax, senna,  albuterol, lipitor, Phos-Nak     Physical Findings:  - Appearance: alert, confused (AMS and likely superimposed progressive dementia)  - GI function: LBM 7/8 per RN  - Tubes:  - Oral/Mouth cavity: dys-3-thins per SLP  - Skin: 2+ edema R arm      Nutrition Requirements  Weight Used: needs cont from RD assessment 7/2      estimated energy needs: 990-1075kcal (MSJx1.2-1.3AF)  estimated protein needs: 46-55g (1.0-1.2g/kg)  estimated fluid needs: 1ml/kcal or per LIP    Nutrient Intake: not meeting needs         [] Previous Nutrition Diagnosis: inadequate protein-energy intake + unintentional wt loss            [x] Ongoing          [] Resolved  However, despite pt family reporting #, per previous admit, pt wt was 105# in June    [] No active nutrition diagnosis identified at this time       Nutrition Intervention meal/snack, med food supplement, nutrition-related medication management, vit/min supplementation     Goal/Expected Outcome: Pt to consume >50% of meal tray and at least maintain CBW in 1-2 lbs in 3 days      Indicator/Monitoring: RD to monitor energy intake, diet order, body comp, NFPF electrolyte profile    Recommendation: Cont diet texture per SLP (rec dys-3-thins). Continue with DASH/TLC and add ensure pudding TID. Would consider appetite stimulant if deemed medically feasible as pt refusing to eat. Please add MVI/minerals.    --discussed with covering resident. Registered Dietitian Follow-Up     Patient Profile Reviewed                           Yes [x]   No []     Nutrition History Previously Obtained        Yes [x]  No []      Pertinent Subjective Information: 1:1 feed. Spoke w/ RN, pt continues with poor PO intake, however, is eating a little. 3day Calorie Count was due today, however, was extended x1 more day- now due 7/13. Original 3 day calorie count is in paper chart***, new 1 day calorie count still hung in pt's room.     Pertinent Medical Interventions: Was successfully weaned off BIPAP. Hypernatremia - resolved. Hypoglycemia? - PO Challenge by speech pathology daily. KATINA, urinary retention related, resolved. Limited PO intake- started on Megestrol.    Diet order: Dys-3-thins, DASH/TLC, ensure pudding TID     Anthropometrics:  - Ht. 60"  - Wt. 7/8 120# Last wt 101# more likely  - %wt change 7/12 113#- vs 101#, likely bed scale error  - BMI   IBW: 100#+/-10%      Pertinent Lab Data: (7/12) s gluc 115     Pertinent Meds: heparin, norvasc, MVI/minerals, megace, senna, lipitor     Physical Findings:  - Appearance: alert, confused (AMS and likely superimposed progressive dementia)  - GI function: LBM 7/11 per RN  - Tubes:  - Oral/Mouth cavity: dys-3-thins per SLP  - Skin: 2+ edema R arm, wound from BIPAP on nose, L elbow abrasion     Nutrition Requirements  Weight Used: needs cont from RD assessment 7/2      estimated energy needs: 990-1075kcal (MSJx1.2-1.3AF)  estimated protein needs: 46-55g (1.0-1.2g/kg)  estimated fluid needs: 1ml/kcal or per LIP    Nutrient Intake: not meeting needs      [] Previous Nutrition Diagnosis: inadequate protein-energy intake + unintentional wt loss            [x] Ongoing          [] Resolved  However, despite pt family reporting #, per previous admit, pt wt was 105# in June    [] No active nutrition diagnosis identified at this time    Nutrition Intervention meals and snacks, med food supplement, nutrition-related medication management, vit/min supplementation.  RECS: Continue current diet. Noted pt started on vit/min supplement + Megace- usually takes 1-2 weeks to feel effect.  KCAL RESULTS DUE 7/13   --discussed with covering resident.    Goal/Expected Outcome: Pt to consume >50% of meal tray and at least maintain CBW in 1-2 lbs in 3 days      Indicator/Monitoring: RD to monitor energy intake, diet order, body comp, NFPF electrolyte profile

## 2019-07-13 ENCOUNTER — TRANSCRIPTION ENCOUNTER (OUTPATIENT)
Age: 84
End: 2019-07-13

## 2019-07-13 VITALS
SYSTOLIC BLOOD PRESSURE: 152 MMHG | RESPIRATION RATE: 18 BRPM | HEART RATE: 74 BPM | DIASTOLIC BLOOD PRESSURE: 85 MMHG | TEMPERATURE: 97 F

## 2019-07-13 LAB
ANION GAP SERPL CALC-SCNC: 8 MMOL/L — SIGNIFICANT CHANGE UP (ref 7–14)
BASOPHILS # BLD AUTO: 0.02 K/UL — SIGNIFICANT CHANGE UP (ref 0–0.2)
BASOPHILS NFR BLD AUTO: 0.4 % — SIGNIFICANT CHANGE UP (ref 0–1)
BUN SERPL-MCNC: 18 MG/DL — SIGNIFICANT CHANGE UP (ref 10–20)
CALCIUM SERPL-MCNC: 9.7 MG/DL — SIGNIFICANT CHANGE UP (ref 8.5–10.1)
CHLORIDE SERPL-SCNC: 104 MMOL/L — SIGNIFICANT CHANGE UP (ref 98–110)
CO2 SERPL-SCNC: 30 MMOL/L — SIGNIFICANT CHANGE UP (ref 17–32)
CREAT SERPL-MCNC: 0.9 MG/DL — SIGNIFICANT CHANGE UP (ref 0.7–1.5)
EOSINOPHIL # BLD AUTO: 0 K/UL — SIGNIFICANT CHANGE UP (ref 0–0.7)
EOSINOPHIL NFR BLD AUTO: 0 % — SIGNIFICANT CHANGE UP (ref 0–8)
GLUCOSE BLDC GLUCOMTR-MCNC: 100 MG/DL — HIGH (ref 70–99)
GLUCOSE BLDC GLUCOMTR-MCNC: 84 MG/DL — SIGNIFICANT CHANGE UP (ref 70–99)
GLUCOSE SERPL-MCNC: 85 MG/DL — SIGNIFICANT CHANGE UP (ref 70–99)
HCT VFR BLD CALC: 36.6 % — LOW (ref 37–47)
HGB BLD-MCNC: 11.6 G/DL — LOW (ref 12–16)
IMM GRANULOCYTES NFR BLD AUTO: 0.4 % — HIGH (ref 0.1–0.3)
LYMPHOCYTES # BLD AUTO: 1.83 K/UL — SIGNIFICANT CHANGE UP (ref 1.2–3.4)
LYMPHOCYTES # BLD AUTO: 35.8 % — SIGNIFICANT CHANGE UP (ref 20.5–51.1)
MAGNESIUM SERPL-MCNC: 2.1 MG/DL — SIGNIFICANT CHANGE UP (ref 1.8–2.4)
MCHC RBC-ENTMCNC: 29.7 PG — SIGNIFICANT CHANGE UP (ref 27–31)
MCHC RBC-ENTMCNC: 31.7 G/DL — LOW (ref 32–37)
MCV RBC AUTO: 93.8 FL — SIGNIFICANT CHANGE UP (ref 81–99)
MONOCYTES # BLD AUTO: 0.39 K/UL — SIGNIFICANT CHANGE UP (ref 0.1–0.6)
MONOCYTES NFR BLD AUTO: 7.6 % — SIGNIFICANT CHANGE UP (ref 1.7–9.3)
NEUTROPHILS # BLD AUTO: 2.85 K/UL — SIGNIFICANT CHANGE UP (ref 1.4–6.5)
NEUTROPHILS NFR BLD AUTO: 55.8 % — SIGNIFICANT CHANGE UP (ref 42.2–75.2)
NRBC # BLD: 0 /100 WBCS — SIGNIFICANT CHANGE UP (ref 0–0)
PHOSPHATE SERPL-MCNC: 3.3 MG/DL — SIGNIFICANT CHANGE UP (ref 2.1–4.9)
PLATELET # BLD AUTO: 193 K/UL — SIGNIFICANT CHANGE UP (ref 130–400)
POTASSIUM SERPL-MCNC: 5.3 MMOL/L — HIGH (ref 3.5–5)
POTASSIUM SERPL-SCNC: 5.3 MMOL/L — HIGH (ref 3.5–5)
RBC # BLD: 3.9 M/UL — LOW (ref 4.2–5.4)
RBC # FLD: 14.5 % — SIGNIFICANT CHANGE UP (ref 11.5–14.5)
SODIUM SERPL-SCNC: 142 MMOL/L — SIGNIFICANT CHANGE UP (ref 135–146)
WBC # BLD: 5.11 K/UL — SIGNIFICANT CHANGE UP (ref 4.8–10.8)
WBC # FLD AUTO: 5.11 K/UL — SIGNIFICANT CHANGE UP (ref 4.8–10.8)

## 2019-07-13 PROCEDURE — 99239 HOSP IP/OBS DSCHRG MGMT >30: CPT

## 2019-07-13 RX ORDER — MEGESTROL ACETATE 40 MG/ML
10 SUSPENSION ORAL
Qty: 0 | Refills: 0 | DISCHARGE
Start: 2019-07-13

## 2019-07-13 RX ORDER — IPRATROPIUM/ALBUTEROL SULFATE 18-103MCG
3 AEROSOL WITH ADAPTER (GRAM) INHALATION
Qty: 0 | Refills: 0 | DISCHARGE
Start: 2019-07-13

## 2019-07-13 RX ORDER — MULTIVIT-MIN/FERROUS GLUCONATE 9 MG/15 ML
1 LIQUID (ML) ORAL
Qty: 0 | Refills: 0 | DISCHARGE
Start: 2019-07-13

## 2019-07-13 RX ORDER — SENNA PLUS 8.6 MG/1
2 TABLET ORAL
Qty: 0 | Refills: 0 | DISCHARGE
Start: 2019-07-13

## 2019-07-13 RX ORDER — POLYETHYLENE GLYCOL 3350 17 G/17G
17 POWDER, FOR SOLUTION ORAL
Qty: 0 | Refills: 0 | DISCHARGE
Start: 2019-07-13

## 2019-07-13 RX ORDER — AMLODIPINE BESYLATE 2.5 MG/1
1 TABLET ORAL
Qty: 30 | Refills: 0
Start: 2019-07-13 | End: 2019-08-11

## 2019-07-13 RX ORDER — AMLODIPINE BESYLATE 2.5 MG/1
5 TABLET ORAL ONCE
Refills: 0 | Status: COMPLETED | OUTPATIENT
Start: 2019-07-13 | End: 2019-07-13

## 2019-07-13 RX ADMIN — Medication 3 MILLILITER(S): at 07:41

## 2019-07-13 RX ADMIN — MEGESTROL ACETATE 400 MILLIGRAM(S): 40 SUSPENSION ORAL at 05:51

## 2019-07-13 RX ADMIN — Medication 1 TABLET(S): at 11:28

## 2019-07-13 RX ADMIN — Medication 3 MILLILITER(S): at 16:09

## 2019-07-13 RX ADMIN — CHLORHEXIDINE GLUCONATE 1 APPLICATION(S): 213 SOLUTION TOPICAL at 05:49

## 2019-07-13 RX ADMIN — AMLODIPINE BESYLATE 5 MILLIGRAM(S): 2.5 TABLET ORAL at 05:49

## 2019-07-13 RX ADMIN — Medication 1 APPLICATION(S): at 05:50

## 2019-07-13 RX ADMIN — HEPARIN SODIUM 5000 UNIT(S): 5000 INJECTION INTRAVENOUS; SUBCUTANEOUS at 05:50

## 2019-07-13 NOTE — PROGRESS NOTE ADULT - ASSESSMENT
Assessment:  85 y/o F w/ PMH of DLD, HTN, CHF, and recent hospitalization of PNA presented from home w/ fever and AMS. Patient is non-verbal.    #Hypercapnic Encephalopathy- improves w/ BIPAP  - Clinically improving  - on 2L cannula this morning. More responsive.  - Continue monitoring pulse ox  - Started on megace for appetite - takes some time to work  - Dietician recommended dysphagia 3 with ensure pudding TID + Multivitmain w/ mineral  - PT/ Rehab consult f/u  - Successfully weaned off BIPAP on 7/3. Continuing PRN  - EEG showed no seizure activity  - MR brain revealed chronic microvascular changes and chronic lacunar infarctions  - CXR shows basilar effusions with no change 7/11  - Pulmonary consult recommended weaning off BIPAP, albuterol nebulizers, IV steroids, outpatient PFT's, and GOC discussion  - Palliative care consulted - no intervention at this time as patient is improving    #End expiratory grunting noise - upper airway issue?  - ENT recommended CT. CT showed a postobstructive atelectasis likely representing a mucus plug, with partially imaged bilateral pleural effusions.  - Patient refused a scope  - Chest PT  - duonebs    #No recent BM  - Senna/Colace    #Limited PO Intake  - Calorie counting  - Megestrol 400 suspension BID    #HTN / Hyperkalemia  - Enalapril discontinued due to rising potassium (5.3 today)  - Switched to norvasc  - Given Kayexalate 30    #Hypernatremia - resolved  - Sodium, Serum: 142 mmol/L (07.12.19)   - DC Fluids for now  - BMP in the morning  - repeating Mg    #Hypoglycemia?  - PO Challenge by speech pathology daily   - Recent fingersticks POCT GLUCOSE:    - Fingersticks 2/day  POCT Blood Glucose.: 84 mg/dL [70 - 99] (07-13-19)  POCT Blood Glucose.: 115 mg/dL <H> [70 - 99] (07-12-19)  POCT Blood Glucose.: 90 mg/dL [70 - 99] (07-12-19)  POCT Blood Glucose.: 86 mg/dL [70 - 99] (07-12-19)    - Underlying neoplasm (i.e. insulinoma)?  - Infuse dextrose if <80   - Cortisol AM, Serum: 11.8 ug/dL (06.27.19 @ 21:16) WNL    #Acute urinary retention  - seen on renal/bladder US.  - Funez inserted  - monitor I&O    #KATINA  - Urinary retention related, resolved    DNR/DNI    Possible DC today or tomorrow

## 2019-07-13 NOTE — DISCHARGE NOTE PROVIDER - NSDCCPCAREPLAN_GEN_ALL_CORE_FT
PRINCIPAL DISCHARGE DIAGNOSIS  Diagnosis: Acute hypercapnic respiratory failure  Assessment and Plan of Treatment: You experienced hypercapnic respiratory failure. Your breathing was compromised, likely secondary to chronic central ischemic changes. This resulted in the buildup of acid in your blood, which caused altered mental status. Placement on BIPAP improved your CO2 levels and your mental status improved. Please follow up with your medical team once discharged.

## 2019-07-13 NOTE — DISCHARGE NOTE NURSING/CASE MANAGEMENT/SOCIAL WORK - NSDCDPATPORTLINK_GEN_ALL_CORE
You can access the [facility] Patient Portal, offered by [facility], by registering with the following website: [email or web address]

## 2019-07-13 NOTE — PROGRESS NOTE ADULT - PROVIDER SPECIALTY LIST ADULT
ENT
Hospitalist
Internal Medicine
Neurology
Palliative Care
Palliative Care
Pulmonology
Internal Medicine

## 2019-07-13 NOTE — PROGRESS NOTE ADULT - NSHPATTENDINGPLANDISCUSS_GEN_ALL_CORE
house staff
pulm fellow

## 2019-07-13 NOTE — DISCHARGE NOTE PROVIDER - NSDCACTIVITY_GEN_ALL_CORE
Do not drive or operate machinery/No heavy lifting/straining/Do not make important decisions/No restrictions

## 2019-07-13 NOTE — DISCHARGE NOTE PROVIDER - CARE PROVIDER_API CALL
Chano Roblero)  Internal Medicine  [address] [place] [address]  Phone: [phone]  Fax: [phone]  Follow Up Time: 1 week

## 2019-07-13 NOTE — PROGRESS NOTE ADULT - SUBJECTIVE AND OBJECTIVE BOX
HPI  Patient is a 86y old Female who presents with a chief complaint of Fever since yesterday (12 Jul 2019 18:46)    Currently admitted to medicine with the primary diagnosis of Pneumonia     Today is hospital day 18d.     INTERVAL HPI / OVERNIGHT EVENTS:  Patient was examined and seen at bedside. This morning she is resting comfortably in bed with oxygen on 2L and no grunting noises.  Patient has a primafit on, with good urine output. The patient was alert, nodded and said "hello", More alert today. Even mentioned "sarai" when asked who the president was. Also answered "[id number]" again for theyear, and answered "home" when asked if she knew where she was. Answered "no pain" when asked if something was hurting. The patient further denied chest pain, shortness of breath, or chest tightness. Patient has a lesion that is healing on the nose from the BIPAP. No BM reported.     ROS: Otherwise unremarkable     PAST MEDICAL & SURGICAL HISTORY  HLD (hyperlipidemia)  Diastolic dysfunction  HTN (hypertension)  History of cholecystectomy  H/O bilateral hip replacements: Right Hip ORIF on Xray    ALLERGIES  No Known Allergies    MEDICATIONS  STANDING MEDICATIONS  ALBUTerol/ipratropium for Nebulization 3 milliLiter(s) Nebulizer every 8 hours  atorvastatin 10 milliGRAM(s) Oral at bedtime  BACItracin   Ointment 1 Application(s) Topical two times a day  chlorhexidine 4% Liquid 1 Application(s) Topical <User Schedule>  heparin  Injectable 5000 Unit(s) SubCutaneous every 12 hours  megestrol Suspension 400 milliGRAM(s) Oral <User Schedule>  multivitamin/minerals 1 Tablet(s) Oral daily  senna 2 Tablet(s) Oral at bedtime    PRN MEDICATIONS  acetaminophen   Tablet .. 650 milliGRAM(s) Oral every 6 hours PRN  dextrose 50% Injectable 50 milliLiter(s) IV Push once PRN  polyethylene glycol 3350 17 Gram(s) Oral daily PRN    VITALS:  T(F): 98.2  HR: 83  BP: 179/67  RR: 18  SpO2: 100%    PHYSICAL EXAM  GEN: NAD, Resting comfortably in bed on room air  PULM: Clear to auscultation bilaterally, No wheezes.   CVS: Regular rate and rhythm, S1-S2, audible systolic murmur appreciated left parasternal  ABD: Soft, non-tender, non-distended, no guarding  EXT: No edema  NEURO: AAOx1, says her name. Answered nonspecifically to the year and "home" to the location and "no pain" if anything hurt. Said "sarai" to who the president is.    LABS                        11.6   5.11  )-----------( 193      ( 13 Jul 2019 07:16 )             36.6     07-13    142  |  104  |  18  ----------------------------<  85  5.3<H>   |  30  |  0.9    Ca    9.7      13 Jul 2019 07:16  Phos  3.3     07-13  Mg     2.1     07-13    POCT GLUCOSE:  POCT Blood Glucose.: 84 mg/dL [70 - 99] (07-13-19)  POCT Blood Glucose.: 115 mg/dL <H> [70 - 99] (07-12-19)  POCT Blood Glucose.: 90 mg/dL [70 - 99] (07-12-19)  POCT Blood Glucose.: 86 mg/dL [70 - 99] (07-12-19)    RADIOLOGY  < from: Xray Chest 1 View- PORTABLE-Urgent (07.11.19 @ 09:39) >  Impression:      Basilar opacificationsand effusions with cardiomegaly without difference    < end of copied text >

## 2019-07-13 NOTE — DISCHARGE NOTE PROVIDER - HOSPITAL COURSE
Assessment:    85 y/o F w/ PMH of DLD, HTN, CHF, and recent hospitalization of PNA presented from home w/ fever and AMS. Patient is non-verbal. Went through several bouts of hypercapnic encephalopathy which improved with BIPAP. Patient was eventually weakned off BIPAP successfully and tolerated PO intake. The patient has returned to baseline as per the family. The family understood the plan and the hospital course.        #Hypercapnic Encephalopathy- improves w/ BIPAP    - Clinically improving  - on 2L cannula this morning. More responsive.    - Continue monitoring pulse ox    - Started on megace for appetite - takes some time to work    - Dietician recommended dysphagia 3 with ensure pudding TID + Multivitmain w/ mineral    - PT/ Rehab consult f/u    - Successfully weaned off BIPAP on 7/3. Continuing PRN    - EEG showed no seizure activity    - MR brain revealed chronic microvascular changes and chronic lacunar infarctions    - CXR shows basilar effusions with no change 7/11    - Pulmonary consult recommended weaning off BIPAP, albuterol nebulizers, IV steroids, outpatient PFT's, and GOC discussion    - Palliative care consulted - no intervention at this time as patient is improving        #End expiratory grunting noise - upper airway issue?    - ENT recommended CT. CT showed a postobstructive atelectasis likely representing a mucus plug, with partially imaged bilateral pleural effusions.    - Patient refused a scope    - Chest PT    - duonebs        #No recent BM    - Senna/Colace        #Limited PO Intake    - Calorie counting    - Megestrol 400 suspension BID        #HTN / Hyperkalemia    - Enalapril discontinued due to rising potassium (5.3 today)    - Switched to norvasc    - Given Kayexalate 30        #Hypernatremia - resolved    - Sodium, Serum: 142 mmol/L (07.12.19)     - DC Fluids for now    - BMP in the morning    - repeating Mg        #Hypoglycemia?    - PO Challenge by speech pathology daily     - Recent fingersticks POCT GLUCOSE:        - Fingersticks 2/day    POCT Blood Glucose.: 84 mg/dL [70 - 99] (07-13-19)    POCT Blood Glucose.: 115 mg/dL <H> [70 - 99] (07-12-19)    POCT Blood Glucose.: 90 mg/dL [70 - 99] (07-12-19)    POCT Blood Glucose.: 86 mg/dL [70 - 99] (07-12-19)        - Underlying neoplasm (i.e. insulinoma)?    - Infuse dextrose if <80     - Cortisol AM, Serum: 11.8 ug/dL (06.27.19 @ 21:16) WNL        #Acute urinary retention    - seen on renal/bladder US.    - Funez inserted    - monitor I&O        #KATINA    - Urinary retention related, resolved        DNR/DNI

## 2019-07-15 ENCOUNTER — OUTPATIENT (OUTPATIENT)
Dept: OUTPATIENT SERVICES | Facility: HOSPITAL | Age: 84
LOS: 1 days | Discharge: HOME | End: 2019-07-15

## 2019-07-15 DIAGNOSIS — Z90.49 ACQUIRED ABSENCE OF OTHER SPECIFIED PARTS OF DIGESTIVE TRACT: Chronic | ICD-10-CM

## 2019-07-15 DIAGNOSIS — Z96.643 PRESENCE OF ARTIFICIAL HIP JOINT, BILATERAL: Chronic | ICD-10-CM

## 2019-07-16 DIAGNOSIS — R10.9 UNSPECIFIED ABDOMINAL PAIN: ICD-10-CM

## 2019-07-18 DIAGNOSIS — R74.0 NONSPECIFIC ELEVATION OF LEVELS OF TRANSAMINASE AND LACTIC ACID DEHYDROGENASE [LDH]: ICD-10-CM
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
## 2019-07-18 DIAGNOSIS — Z53.20 PROCEDURE AND TREATMENT NOT CARRIED OUT BECAUSE OF PATIENT'S DECISION FOR UNSPECIFIED REASONS: ICD-10-CM

## 2019-07-18 DIAGNOSIS — N17.9 ACUTE KIDNEY FAILURE, UNSPECIFIED: ICD-10-CM

## 2019-07-18 DIAGNOSIS — T17.990A OTHER FOREIGN OBJECT IN RESPIRATORY TRACT, PART UNSPECIFIED IN CAUSING ASPHYXIATION, INITIAL ENCOUNTER: ICD-10-CM

## 2019-07-18 DIAGNOSIS — J96.00 ACUTE RESPIRATORY FAILURE, UNSPECIFIED WHETHER WITH HYPOXIA OR HYPERCAPNIA: ICD-10-CM

## 2019-07-18 DIAGNOSIS — Z79.2 LONG TERM (CURRENT) USE OF ANTIBIOTICS: ICD-10-CM

## 2019-07-18 DIAGNOSIS — G93.49 OTHER ENCEPHALOPATHY: ICD-10-CM

## 2019-07-18 DIAGNOSIS — N18.3 CHRONIC KIDNEY DISEASE, STAGE 3 (MODERATE): ICD-10-CM

## 2019-07-18 DIAGNOSIS — R50.9 FEVER, UNSPECIFIED: ICD-10-CM
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
## 2019-07-18 DIAGNOSIS — J44.9 CHRONIC OBSTRUCTIVE PULMONARY DISEASE, UNSPECIFIED: ICD-10-CM

## 2019-07-18 DIAGNOSIS — E87.2 ACIDOSIS: ICD-10-CM

## 2019-07-18 DIAGNOSIS — I13.0 HYPERTENSIVE HEART AND CHRONIC KIDNEY DISEASE WITH HEART FAILURE AND STAGE 1 THROUGH STAGE 4 CHRONIC KIDNEY DISEASE, OR UNSPECIFIED CHRONIC KIDNEY DISEASE: ICD-10-CM

## 2019-07-18 DIAGNOSIS — R53.81 OTHER MALAISE: ICD-10-CM

## 2019-07-18 DIAGNOSIS — I50.32 CHRONIC DIASTOLIC (CONGESTIVE) HEART FAILURE: ICD-10-CM

## 2019-07-18 DIAGNOSIS — R40.20 UNSPECIFIED COMA: ICD-10-CM

## 2019-07-18 DIAGNOSIS — Z66 DO NOT RESUSCITATE: ICD-10-CM

## 2019-07-18 DIAGNOSIS — Z51.5 ENCOUNTER FOR PALLIATIVE CARE: ICD-10-CM

## 2019-07-18 DIAGNOSIS — E16.2 HYPOGLYCEMIA, UNSPECIFIED: ICD-10-CM

## 2019-07-18 DIAGNOSIS — J98.11 ATELECTASIS: ICD-10-CM

## 2019-07-18 DIAGNOSIS — E87.0 HYPEROSMOLALITY AND HYPERNATREMIA: ICD-10-CM

## 2019-07-18 DIAGNOSIS — R33.9 RETENTION OF URINE, UNSPECIFIED: ICD-10-CM

## 2019-07-18 DIAGNOSIS — J96.22 ACUTE AND CHRONIC RESPIRATORY FAILURE WITH HYPERCAPNIA: ICD-10-CM

## 2019-07-18 DIAGNOSIS — F03.91 UNSPECIFIED DEMENTIA WITH BEHAVIORAL DISTURBANCE: ICD-10-CM

## 2019-07-18 DIAGNOSIS — Z87.01 PERSONAL HISTORY OF PNEUMONIA (RECURRENT): ICD-10-CM

## 2019-07-18 DIAGNOSIS — R63.8 OTHER SYMPTOMS AND SIGNS CONCERNING FOOD AND FLUID INTAKE: ICD-10-CM

## 2019-09-06 NOTE — SWALLOW BEDSIDE ASSESSMENT ADULT - SWALLOW EVAL: FUNCTIONAL LEVEL AT TIME OF EVAL
A&O x1. able to follow commands.
Pt alert and able to state her name/ and respond to simple questions. Pt able to follow 1-step commands.
s/p open suprapubic prostatectomy  f/up path

## 2019-12-22 NOTE — H&P ADULT - HISTORY OF PRESENT ILLNESS
87 yo F with PMH HTN, DLD, active smoker presented to ED from home after patient was noted to have worsening confusion, lethargy, and fever (T=101) at home. Collateral history obtained from daughter at bedside. Also report patient has been having poor appetite and decreased PO intake for past couple of weeks. Patient was recently started on Bactrim for what was thought to be LE cellulitis. Had one episode of NBNB emesis day of presentation. Denies any dysuria, frequency, urgency, abdominal pain, diarrhea, chest pain, SOB, abdominal pain. [age] yo F with PMH HTN, DLD, active smoker presented to ED from home after family noticed patient was unsteady on feet and having worsening confusion, lethargy, and fever (T=101) at home. Collateral history obtained from daughter at bedside. Report that patient had LE erythema noted day PTP. Went to PCP office, was given course of Bactrim. Took 2 doses of Bactirm. Stated LE erythema improved but patient was still unsteady, confused, and had a fever. Also had 1 episode of NBNB emesis (after taking Bactrim on empty stomach). Denies any dysuria, frequency, urgency, abdominal pain, diarrhea, chest pain, SOB, abdominal pain.    Daughter states patient has been having decreased PO intake for past couple of weeks. Discussed with PCP - said she only lost 1 lb so was not concerning. Goes to [facility] during 8-2, has [facility] from 8-4. Is continent of urine and stool, wears diapers around the clock at home. Ambulatory with walker. Feeds herself although cannot bath herself. Lives at home with daughter.

## 2020-03-10 NOTE — SWALLOW BEDSIDE ASSESSMENT ADULT - COMMENTS
9 dysphagia evaluation conducted bedside. pt received alert, responsive, +confusion. normal breath patterns, on room air. pt's vocal quality is hoarse. no c/o pain during assessment. +toleration with no overt s/s of aspiration vs penetration +toleration mild oral dysphagia moderate oral dysphagia

## 2021-01-06 NOTE — PROGRESS NOTE ADULT - ATTENDING COMMENTS
- Persistent confusion state- most likely as a result of hypercapnic respiratory failure with superimposed progressive senile  dementia  - recent blood cxs- no bacterial growth,   - repeated  blood and urine cxs- no bacterial growth  -no leukocytosis on admission.  -CT head was completed on admission , showed old CVA, no acute changes, patient was evaluated by  Neurology specialist ,  - s/p  EEG- no seizure like activity  -patient had completed  MRI of Brain on 6/29 - no acute CVA  - continue IVF- D5W@100ml/hr  -bsfs monitoring  -neuro assessments every shift.    - Hypernatremia- with dehydration state  -continue  IVF to Dextrose 5 in water @ 100 ml/hr  -next BMP in am    -borderline low bsfs- 70-80  range- continue dextrose infusion tx. with close bsfs monitoring    -Acute hypercapneic respiratory failure- acute Respiratory acidosis  - continue  on BIPAP tx  -monitor pulse ox level  -duonebs tx.  - repeated CXR - pleural effusions    -Acute Urinary retention- seen on renal/bladder US- inserted vázquez, patient has good urine output,  - monitor I and O strictly    -Recently tx. pneumonia- pt. completed tx., CXR- persistent left basilar opacity- no new pneumonia   - underlying neoplasm can not be r/o, patient will need outpatient CT chest to be repeated in 1.5 months to assess left lung opacitiy.    -KATINA- most likely due to urinary retention, resolved  continue IVF    -h/o HTN - resume on home meds    diet: speech eval    daily GI and DVT proph.     Failure to thrive with no reponse to medical therapy, overall prognosis: poor- I have recommended to family to consider transitioning patient to comfort level of care      #Progress Note Handoff: Pending Consults___will need Palliative consult if family agree ______,Tests_______,Test Results__ABG tomorrow _____, other: will plan to have family meeting today, to assess if patient can be transitioned to comfort care due to failure to thrive  Family discussion: patient's daughter by bedside  Disposition: to be determined
87 y/o F w/ PMH of DLD, HTN, CHF, and recent hospitalization of PNA presented from home w/ fever and AMS.    #AMS and likely superimposed progressive dementia  -stable  -CT head old cva- no changes--MRI brain 6/29 negative for acute cva  -appreciate neuro recomm  -EEG negative for sz activity      #acute on chronic hypercapneic respiratory failure upon admission  -weaned off bipap on 7/7. Respiratory rate still a little high.   Diagnosis was never made clear.     We discovered a long term smoking history but never heard any wheeze, lung parenchyma looks relatively ok on Chest CT.   Check with pulm what could be the cause of her hypercapnic respi failure.   will need full PFTs outpatient.     ENT consult to r/o upper airway obstruction. Hear a grunting sound with every expiration.    ---now on o2 via nc with prn bipap  as needed and qhs  -last abg with ph 7.38/ pco2 57/ po2 75 hco3-33  - recent blood cxs-negative      - Hypernatremia- with dehydration state-improving  -na 146 7/7  -fluid restriction  -monitor bmp daily (refused on 7/8)  -cont ivf D5w @100 ml/hr       -Recently tx. pneumonia- pt. completed tx., CXR- persistent left basilar opacity- no new pneumonia   - underlying neoplasm can not be r/o, patient will need outpatient CT chest to be repeated in 1.5 months to assess left lung opacitiy.    diet: speech eval--dysphagia 2 diet--nutrition following    #GI and DVT proph.   DNR/DNI    overall poor prognosis    #Progress Note Handoff  Disposition:Unknown at this time--anticipate STR placement this week---will need PT reeval
Patient was evaluated and examined by bedside, confused, awake, has poor appetite  All labs, radiology studies, VS was reviewed  I agree with medical plan outlined by Medical resident as stated above.  - clinicaly patient has improved, off bipap tx. now  -continue close pulse ox monitoring  -poor appetite- started on megace supplement  -dementia- functions at baseline    #Progress Note Handoff: d/c plan: SNF once po intake improves.  Family discussion: yes Disposition: SNF
Patient was evaluated and examined by bedside, confused, awake, has slightly improved appetite  All labs, radiology studies, VS was reviewed  I agree with medical plan outlined by Medical resident as stated above.  - clinicaly patient has improved, off bipap tx. now  -continue close pulse ox monitoring  -b/l lower lobes atelectasis- mucus plugging - chest PT  -poor appetite- started on megace supplement on daily calorie count  -dementia- functions at baseline    #Progress Note Handoff: d/c plan: SNF once po intake improves.  Family discussion: yes Disposition: SNF
Patient was evaluated and examined by bedside, tolerating diet well, remains confused, no cough.  All labs, radiology studies, VS was reviewed  I agree with medical plan outlined by Medical resident as stated above.  - Acute hypoxic respiratory failure with h/o COPD - clinicaly patient has improved, off bipap tx. now  -continue oxygen tx.  -b/l lower lobes atelectasis- mucus plugging - chest PT    -poor appetite- started on megace supplement, continue to encourage po intake    -dementia- functions at baseline    -HTN- norvasc 2.5 mg po once daily    #Progress Note Handoff: d/c plan: SNF once bed available  Family discussion: yes Disposition: SNF today if bed available.
87 y/o F w/ PMH of DLD, HTN, CHF, and recent hospitalization of PNA presented from home w/ fever and AMS.    #"Altered Mental status" :  likely it's all d/t underlying progressive dementia with behavioral changes.    -CT head old cva- no changes--MRI brain 6/29 negative for acute cva  -appreciate neuro recomm  -EEG negative for sz activity      # transient acute on chronic hypercapneic respiratory failure upon admission  Improved with bipap. weaned off bipap on 7/7. Respiratory rate still a little high.   Diagnosis was never made clear.     We discovered a long term smoking history but never heard any wheeze, lung parenchyma looks relatively ok on Chest CT.   Check with pulm what could be the cause of her hypercapnic respi failure.   will need full PFTs outpatient.   But for now we can just start spiriva daily if pulm agrees.    ENT tried to scope her to r/o upper airway obstruction (since we hear a grunting sound with every expiration), but refused scope. Get a CT Neck to r/o any upper airway obstruction as per ENT.     Check an AM ABG to make sure she is not retaining CO2 again (now that she is off bipap since 7/7)    saturating ok on NC O2.    -last abg with ph 7.38/ pco2 57/ po2 75 hco3-33      # Hypernatremia- with dehydration state-  sceondary to not eating so well.   Today na 144. Change D5W to 1/2 NS @ 60  -monitor bmp daily     #Poor PO Intake :   2/2 dementia.   encourage as much as possible.   started a calorie count on 7/9.   See if her intake will be acceptable to the nursing home.       -Recently tx. pneumonia- pt. completed tx., CXR- persistent left basilar opacity- no new pneumonia   - underlying neoplasm can not be r/o, patient will need outpatient CT chest to be repeated in 1.5 months to assess left lung opacitiy.    diet: speech eval--dysphagia 2 diet--nutrition following    #GI and DVT proph.   DNR/DNI      #Progress Note Handoff  ONLY ACTIVE ISSUE NOW IS BEHAVIORAL CHANGES AS A RESULT OF HER DEMENTIA. REFUSING TO EAT.  GIVING HER 1/2NS FOR NOW. DOING CALORIE COUNT. IF INTAKE IMPROVES, MAY PROCEED WITH SNF PLACEMENT.
KEY IS FOR FAMILY TO MEET WITH PALLIATIVE TEAM//////OVERALL PULM REC IS CONSERVATIVE IN FACE OF PTS CLIN STATUS AND ADVANCED AGE
Patient seen this morning and still seems confused.  Suspect toxic-metabolic encephalopathy.  EEG showed slowed L > R but no electrographic seizure activity.  Continue medical management.  No anticonvulsants warrented.   Reconsult if fails to improve.
less than 2 seconds

## 2021-08-12 NOTE — SWALLOW BEDSIDE ASSESSMENT ADULT - ASR SWALLOW LABIAL MOBILITY
within functional limits [Follow-Up: _____] : a [unfilled] follow-up visit [Family Member] : family member [FreeTextEntry1] : Aortic stenosis

## 2021-12-11 NOTE — SWALLOW BEDSIDE ASSESSMENT ADULT - SWALLOW EVAL: MANDIBULAR STRENGTH AND MOBILITY
impaired/reduced ROM
Airway patent, TM normal bilaterally, normal appearing mouth, nose, throat, neck supple with full range of motion, no cervical adenopathy.

## 2022-01-01 ENCOUNTER — INPATIENT (INPATIENT)
Facility: HOSPITAL | Age: 87
LOS: 1 days | End: 2022-09-15
Attending: STUDENT IN AN ORGANIZED HEALTH CARE EDUCATION/TRAINING PROGRAM | Admitting: STUDENT IN AN ORGANIZED HEALTH CARE EDUCATION/TRAINING PROGRAM

## 2022-01-01 VITALS
HEART RATE: 98 BPM | HEIGHT: 60 IN | WEIGHT: 91.05 LBS | SYSTOLIC BLOOD PRESSURE: 115 MMHG | RESPIRATION RATE: 18 BRPM | DIASTOLIC BLOOD PRESSURE: 55 MMHG | TEMPERATURE: 100 F | OXYGEN SATURATION: 99 %

## 2022-01-01 VITALS — HEART RATE: 55 BPM | RESPIRATION RATE: 7 BRPM | SYSTOLIC BLOOD PRESSURE: 58 MMHG | DIASTOLIC BLOOD PRESSURE: 35 MMHG

## 2022-01-01 DIAGNOSIS — Z71.89 OTHER SPECIFIED COUNSELING: ICD-10-CM

## 2022-01-01 DIAGNOSIS — F03.90 UNSPECIFIED DEMENTIA WITHOUT BEHAVIORAL DISTURBANCE: ICD-10-CM

## 2022-01-01 DIAGNOSIS — Z96.643 PRESENCE OF ARTIFICIAL HIP JOINT, BILATERAL: Chronic | ICD-10-CM

## 2022-01-01 DIAGNOSIS — Z90.49 ACQUIRED ABSENCE OF OTHER SPECIFIED PARTS OF DIGESTIVE TRACT: Chronic | ICD-10-CM

## 2022-01-01 DIAGNOSIS — Z51.5 ENCOUNTER FOR PALLIATIVE CARE: ICD-10-CM

## 2022-01-01 DIAGNOSIS — A41.9 SEPSIS, UNSPECIFIED ORGANISM: ICD-10-CM

## 2022-01-01 LAB
ALBUMIN SERPL ELPH-MCNC: 2.8 G/DL — LOW (ref 3.5–5.2)
ALBUMIN SERPL ELPH-MCNC: 3.9 G/DL — SIGNIFICANT CHANGE UP (ref 3.5–5.2)
ALP SERPL-CCNC: 46 U/L — SIGNIFICANT CHANGE UP (ref 30–115)
ALP SERPL-CCNC: 63 U/L — SIGNIFICANT CHANGE UP (ref 30–115)
ALT FLD-CCNC: 12 U/L — SIGNIFICANT CHANGE UP (ref 0–41)
ALT FLD-CCNC: 9 U/L — SIGNIFICANT CHANGE UP (ref 0–41)
ANION GAP SERPL CALC-SCNC: 13 MMOL/L — SIGNIFICANT CHANGE UP (ref 7–14)
ANION GAP SERPL CALC-SCNC: 13 MMOL/L — SIGNIFICANT CHANGE UP (ref 7–14)
ANION GAP SERPL CALC-SCNC: 17 MMOL/L — HIGH (ref 7–14)
ANION GAP SERPL CALC-SCNC: 18 MMOL/L — HIGH (ref 7–14)
ANISOCYTOSIS BLD QL: SLIGHT — SIGNIFICANT CHANGE UP
APPEARANCE UR: ABNORMAL
APTT BLD: 30.3 SEC — SIGNIFICANT CHANGE UP (ref 27–39.2)
AST SERPL-CCNC: 31 U/L — SIGNIFICANT CHANGE UP (ref 0–41)
AST SERPL-CCNC: 59 U/L — HIGH (ref 0–41)
BACTERIA # UR AUTO: ABNORMAL
BASE EXCESS BLDV CALC-SCNC: -1.8 MMOL/L — SIGNIFICANT CHANGE UP (ref -2–3)
BASOPHILS # BLD AUTO: 0 K/UL — SIGNIFICANT CHANGE UP (ref 0–0.2)
BASOPHILS # BLD AUTO: 0.02 K/UL — SIGNIFICANT CHANGE UP (ref 0–0.2)
BASOPHILS NFR BLD AUTO: 0 % — SIGNIFICANT CHANGE UP (ref 0–1)
BASOPHILS NFR BLD AUTO: 1 % — SIGNIFICANT CHANGE UP (ref 0–1)
BILIRUB SERPL-MCNC: 0.4 MG/DL — SIGNIFICANT CHANGE UP (ref 0.2–1.2)
BILIRUB SERPL-MCNC: 0.5 MG/DL — SIGNIFICANT CHANGE UP (ref 0.2–1.2)
BILIRUB UR-MCNC: NEGATIVE — SIGNIFICANT CHANGE UP
BUN SERPL-MCNC: 50 MG/DL — HIGH (ref 10–20)
BUN SERPL-MCNC: 51 MG/DL — HIGH (ref 10–20)
BUN SERPL-MCNC: 51 MG/DL — HIGH (ref 10–20)
BUN SERPL-MCNC: 54 MG/DL — HIGH (ref 10–20)
CA-I SERPL-SCNC: 1.28 MMOL/L — SIGNIFICANT CHANGE UP (ref 1.15–1.33)
CALCIUM SERPL-MCNC: 7.6 MG/DL — LOW (ref 8.4–10.5)
CALCIUM SERPL-MCNC: 7.7 MG/DL — LOW (ref 8.4–10.5)
CALCIUM SERPL-MCNC: 7.9 MG/DL — LOW (ref 8.4–10.5)
CALCIUM SERPL-MCNC: 9.3 MG/DL — SIGNIFICANT CHANGE UP (ref 8.4–10.4)
CHLORIDE SERPL-SCNC: 105 MMOL/L — SIGNIFICANT CHANGE UP (ref 98–110)
CHLORIDE SERPL-SCNC: 110 MMOL/L — SIGNIFICANT CHANGE UP (ref 98–110)
CHLORIDE SERPL-SCNC: 112 MMOL/L — HIGH (ref 98–110)
CHLORIDE SERPL-SCNC: 112 MMOL/L — HIGH (ref 98–110)
CO2 SERPL-SCNC: 13 MMOL/L — LOW (ref 17–32)
CO2 SERPL-SCNC: 15 MMOL/L — LOW (ref 17–32)
CO2 SERPL-SCNC: 16 MMOL/L — LOW (ref 17–32)
CO2 SERPL-SCNC: 24 MMOL/L — SIGNIFICANT CHANGE UP (ref 17–32)
COLOR SPEC: YELLOW — SIGNIFICANT CHANGE UP
CREAT SERPL-MCNC: 2.8 MG/DL — HIGH (ref 0.7–1.5)
CREAT SERPL-MCNC: 2.8 MG/DL — HIGH (ref 0.7–1.5)
CREAT SERPL-MCNC: 2.9 MG/DL — HIGH (ref 0.7–1.5)
CREAT SERPL-MCNC: 3 MG/DL — HIGH (ref 0.7–1.5)
DIFF PNL FLD: ABNORMAL
EGFR: 14 ML/MIN/1.73M2 — LOW
EGFR: 15 ML/MIN/1.73M2 — LOW
EGFR: 16 ML/MIN/1.73M2 — LOW
EGFR: 16 ML/MIN/1.73M2 — LOW
EOSINOPHIL # BLD AUTO: 0 K/UL — SIGNIFICANT CHANGE UP (ref 0–0.7)
EOSINOPHIL # BLD AUTO: 0 K/UL — SIGNIFICANT CHANGE UP (ref 0–0.7)
EOSINOPHIL NFR BLD AUTO: 0 % — SIGNIFICANT CHANGE UP (ref 0–8)
EOSINOPHIL NFR BLD AUTO: 0 % — SIGNIFICANT CHANGE UP (ref 0–8)
EPI CELLS # UR: 0 /HPF — SIGNIFICANT CHANGE UP (ref 0–5)
FLUAV AG NPH QL: SIGNIFICANT CHANGE UP
FLUBV AG NPH QL: SIGNIFICANT CHANGE UP
GAS PNL BLDA: SIGNIFICANT CHANGE UP
GAS PNL BLDV: 140 MMOL/L — SIGNIFICANT CHANGE UP (ref 136–145)
GAS PNL BLDV: SIGNIFICANT CHANGE UP
GIANT PLATELETS BLD QL SMEAR: PRESENT — SIGNIFICANT CHANGE UP
GLUCOSE SERPL-MCNC: 104 MG/DL — HIGH (ref 70–99)
GLUCOSE SERPL-MCNC: 110 MG/DL — HIGH (ref 70–99)
GLUCOSE SERPL-MCNC: 125 MG/DL — HIGH (ref 70–99)
GLUCOSE SERPL-MCNC: 72 MG/DL — SIGNIFICANT CHANGE UP (ref 70–99)
GLUCOSE UR QL: NEGATIVE — SIGNIFICANT CHANGE UP
GRAM STN FLD: SIGNIFICANT CHANGE UP
GRAM STN FLD: SIGNIFICANT CHANGE UP
HCO3 BLDV-SCNC: 25 MMOL/L — SIGNIFICANT CHANGE UP (ref 22–29)
HCT VFR BLD CALC: 25 % — LOW (ref 37–47)
HCT VFR BLD CALC: 28.5 % — LOW (ref 37–47)
HCT VFR BLD CALC: 28.8 % — LOW (ref 37–47)
HCT VFR BLDA CALC: 32 % — LOW (ref 39–51)
HGB BLD CALC-MCNC: 10.6 G/DL — LOW (ref 12.6–17.4)
HGB BLD-MCNC: 7.9 G/DL — LOW (ref 12–16)
HGB BLD-MCNC: 9.2 G/DL — LOW (ref 12–16)
HGB BLD-MCNC: 9.3 G/DL — LOW (ref 12–16)
HYALINE CASTS # UR AUTO: 0 /LPF — SIGNIFICANT CHANGE UP (ref 0–7)
IMM GRANULOCYTES NFR BLD AUTO: 1 % — HIGH (ref 0.1–0.3)
INR BLD: 1.16 RATIO — SIGNIFICANT CHANGE UP (ref 0.65–1.3)
KETONES UR-MCNC: NEGATIVE — SIGNIFICANT CHANGE UP
LACTATE BLDV-MCNC: 3.4 MMOL/L — HIGH (ref 0.5–2)
LACTATE SERPL-SCNC: 2.3 MMOL/L — HIGH (ref 0.7–2)
LACTATE SERPL-SCNC: 6 MMOL/L — CRITICAL HIGH (ref 0.7–2)
LACTATE SERPL-SCNC: 6.1 MMOL/L — CRITICAL HIGH (ref 0.7–2)
LACTATE SERPL-SCNC: 6.2 MMOL/L — CRITICAL HIGH (ref 0.7–2)
LDH SERPL L TO P-CCNC: 343 — HIGH (ref 50–242)
LEUKOCYTE ESTERASE UR-ACNC: ABNORMAL
LYMPHOCYTES # BLD AUTO: 0.53 K/UL — LOW (ref 1.2–3.4)
LYMPHOCYTES # BLD AUTO: 0.91 K/UL — LOW (ref 1.2–3.4)
LYMPHOCYTES # BLD AUTO: 22.1 % — SIGNIFICANT CHANGE UP (ref 20.5–51.1)
LYMPHOCYTES # BLD AUTO: 45.7 % — SIGNIFICANT CHANGE UP (ref 20.5–51.1)
MACROCYTES BLD QL: SLIGHT — SIGNIFICANT CHANGE UP
MAGNESIUM SERPL-MCNC: 1.9 MG/DL — SIGNIFICANT CHANGE UP (ref 1.8–2.4)
MAGNESIUM SERPL-MCNC: 2 MG/DL — SIGNIFICANT CHANGE UP (ref 1.8–2.4)
MAGNESIUM SERPL-MCNC: 2.5 MG/DL — HIGH (ref 1.8–2.4)
MANUAL SMEAR VERIFICATION: SIGNIFICANT CHANGE UP
MCHC RBC-ENTMCNC: 31.6 G/DL — LOW (ref 32–37)
MCHC RBC-ENTMCNC: 31.9 G/DL — LOW (ref 32–37)
MCHC RBC-ENTMCNC: 32.6 G/DL — SIGNIFICANT CHANGE UP (ref 32–37)
MCHC RBC-ENTMCNC: 35 PG — HIGH (ref 27–31)
MCHC RBC-ENTMCNC: 35 PG — HIGH (ref 27–31)
MCHC RBC-ENTMCNC: 35.4 PG — HIGH (ref 27–31)
MCV RBC AUTO: 108.4 FL — HIGH (ref 81–99)
MCV RBC AUTO: 109.5 FL — HIGH (ref 81–99)
MCV RBC AUTO: 110.6 FL — HIGH (ref 81–99)
METHOD TYPE: SIGNIFICANT CHANGE UP
MONOCYTES # BLD AUTO: 0.17 K/UL — SIGNIFICANT CHANGE UP (ref 0.1–0.6)
MONOCYTES # BLD AUTO: 0.2 K/UL — SIGNIFICANT CHANGE UP (ref 0.1–0.6)
MONOCYTES NFR BLD AUTO: 10.1 % — HIGH (ref 1.7–9.3)
MONOCYTES NFR BLD AUTO: 7.1 % — SIGNIFICANT CHANGE UP (ref 1.7–9.3)
NEUTROPHILS # BLD AUTO: 0.84 K/UL — LOW (ref 1.4–6.5)
NEUTROPHILS # BLD AUTO: 1.7 K/UL — SIGNIFICANT CHANGE UP (ref 1.4–6.5)
NEUTROPHILS NFR BLD AUTO: 42.2 % — SIGNIFICANT CHANGE UP (ref 42.2–75.2)
NEUTROPHILS NFR BLD AUTO: 70.8 % — SIGNIFICANT CHANGE UP (ref 42.2–75.2)
NITRITE UR-MCNC: POSITIVE
NRBC # BLD: 0 /100 WBCS — SIGNIFICANT CHANGE UP (ref 0–0)
NRBC # BLD: 0 /100 WBCS — SIGNIFICANT CHANGE UP (ref 0–0)
P AERUGINOSA DNA BLD POS NAA+NON-PROBE: SIGNIFICANT CHANGE UP
PCO2 BLDV: 54 MMHG — HIGH (ref 39–42)
PH BLDV: 7.28 — LOW (ref 7.32–7.43)
PH UR: 6 — SIGNIFICANT CHANGE UP (ref 5–8)
PLAT MORPH BLD: NORMAL — SIGNIFICANT CHANGE UP
PLATELET # BLD AUTO: 127 K/UL — LOW (ref 130–400)
PLATELET # BLD AUTO: 131 K/UL — SIGNIFICANT CHANGE UP (ref 130–400)
PLATELET # BLD AUTO: 155 K/UL — SIGNIFICANT CHANGE UP (ref 130–400)
PO2 BLDV: 18 MMHG — SIGNIFICANT CHANGE UP
POIKILOCYTOSIS BLD QL AUTO: SIGNIFICANT CHANGE UP
POLYCHROMASIA BLD QL SMEAR: SLIGHT — SIGNIFICANT CHANGE UP
POTASSIUM BLDV-SCNC: 4.8 MMOL/L — SIGNIFICANT CHANGE UP (ref 3.5–5.1)
POTASSIUM SERPL-MCNC: 4.8 MMOL/L — SIGNIFICANT CHANGE UP (ref 3.5–5)
POTASSIUM SERPL-MCNC: 5.1 MMOL/L — HIGH (ref 3.5–5)
POTASSIUM SERPL-SCNC: 4.8 MMOL/L — SIGNIFICANT CHANGE UP (ref 3.5–5)
POTASSIUM SERPL-SCNC: 5.1 MMOL/L — HIGH (ref 3.5–5)
PROCALCITONIN SERPL-MCNC: 40.7 NG/ML — HIGH (ref 0.02–0.1)
PROT SERPL-MCNC: 4.9 G/DL — LOW (ref 6–8)
PROT SERPL-MCNC: 6.5 G/DL — SIGNIFICANT CHANGE UP (ref 6–8)
PROT UR-MCNC: ABNORMAL
PROTHROM AB SERPL-ACNC: 13.3 SEC — HIGH (ref 9.95–12.87)
RBC # BLD: 2.26 M/UL — LOW (ref 4.2–5.4)
RBC # BLD: 2.63 M/UL — LOW (ref 4.2–5.4)
RBC # BLD: 2.63 M/UL — LOW (ref 4.2–5.4)
RBC # FLD: 16 % — HIGH (ref 11.5–14.5)
RBC # FLD: 16.3 % — HIGH (ref 11.5–14.5)
RBC # FLD: 16.4 % — HIGH (ref 11.5–14.5)
RBC BLD AUTO: ABNORMAL
RBC CASTS # UR COMP ASSIST: 1 /HPF — SIGNIFICANT CHANGE UP (ref 0–4)
RSV RNA NPH QL NAA+NON-PROBE: SIGNIFICANT CHANGE UP
SAO2 % BLDV: 23.2 % — SIGNIFICANT CHANGE UP
SARS-COV-2 RNA SPEC QL NAA+PROBE: SIGNIFICANT CHANGE UP
SMUDGE CELLS # BLD: PRESENT — SIGNIFICANT CHANGE UP
SODIUM SERPL-SCNC: 141 MMOL/L — SIGNIFICANT CHANGE UP (ref 135–146)
SODIUM SERPL-SCNC: 142 MMOL/L — SIGNIFICANT CHANGE UP (ref 135–146)
SODIUM SERPL-SCNC: 142 MMOL/L — SIGNIFICANT CHANGE UP (ref 135–146)
SODIUM SERPL-SCNC: 143 MMOL/L — SIGNIFICANT CHANGE UP (ref 135–146)
SP GR SPEC: 1.02 — SIGNIFICANT CHANGE UP (ref 1.01–1.03)
SPECIMEN SOURCE: SIGNIFICANT CHANGE UP
SPECIMEN SOURCE: SIGNIFICANT CHANGE UP
UROBILINOGEN FLD QL: SIGNIFICANT CHANGE UP
WBC # BLD: 1.04 K/UL — LOW (ref 4.8–10.8)
WBC # BLD: 1.99 K/UL — LOW (ref 4.8–10.8)
WBC # BLD: 2.4 K/UL — LOW (ref 4.8–10.8)
WBC # FLD AUTO: 1.04 K/UL — LOW (ref 4.8–10.8)
WBC # FLD AUTO: 1.99 K/UL — LOW (ref 4.8–10.8)
WBC # FLD AUTO: 2.4 K/UL — LOW (ref 4.8–10.8)
WBC UR QL: 30 /HPF — HIGH (ref 0–5)

## 2022-01-01 PROCEDURE — 71045 X-RAY EXAM CHEST 1 VIEW: CPT | Mod: 26

## 2022-01-01 PROCEDURE — 99223 1ST HOSP IP/OBS HIGH 75: CPT

## 2022-01-01 PROCEDURE — 99285 EMERGENCY DEPT VISIT HI MDM: CPT

## 2022-01-01 PROCEDURE — 93010 ELECTROCARDIOGRAM REPORT: CPT

## 2022-01-01 PROCEDURE — 99233 SBSQ HOSP IP/OBS HIGH 50: CPT

## 2022-01-01 PROCEDURE — 99238 HOSP IP/OBS DSCHRG MGMT 30/<: CPT

## 2022-01-01 RX ORDER — AZITHROMYCIN 500 MG/1
500 TABLET, FILM COATED ORAL ONCE
Refills: 0 | Status: COMPLETED | OUTPATIENT
Start: 2022-01-01 | End: 2022-01-01

## 2022-01-01 RX ORDER — SODIUM ZIRCONIUM CYCLOSILICATE 10 G/10G
10 POWDER, FOR SUSPENSION ORAL ONCE
Refills: 0 | Status: COMPLETED | OUTPATIENT
Start: 2022-01-01 | End: 2022-01-01

## 2022-01-01 RX ORDER — SODIUM CHLORIDE 9 MG/ML
1000 INJECTION INTRAMUSCULAR; INTRAVENOUS; SUBCUTANEOUS ONCE
Refills: 0 | Status: COMPLETED | OUTPATIENT
Start: 2022-01-01 | End: 2022-01-01

## 2022-01-01 RX ORDER — NOREPINEPHRINE BITARTRATE/D5W 8 MG/250ML
0.05 PLASTIC BAG, INJECTION (ML) INTRAVENOUS
Qty: 8 | Refills: 0 | Status: DISCONTINUED | OUTPATIENT
Start: 2022-01-01 | End: 2022-01-01

## 2022-01-01 RX ORDER — CEFEPIME 1 G/1
1000 INJECTION, POWDER, FOR SOLUTION INTRAMUSCULAR; INTRAVENOUS ONCE
Refills: 0 | Status: COMPLETED | OUTPATIENT
Start: 2022-01-01 | End: 2022-01-01

## 2022-01-01 RX ORDER — MEROPENEM 1 G/30ML
1000 INJECTION INTRAVENOUS ONCE
Refills: 0 | Status: COMPLETED | OUTPATIENT
Start: 2022-01-01 | End: 2022-01-01

## 2022-01-01 RX ORDER — ALBUTEROL 90 UG/1
2 AEROSOL, METERED ORAL EVERY 6 HOURS
Refills: 0 | Status: DISCONTINUED | OUTPATIENT
Start: 2022-01-01 | End: 2022-01-01

## 2022-01-01 RX ORDER — VANCOMYCIN HCL 1 G
500 VIAL (EA) INTRAVENOUS EVERY 24 HOURS
Refills: 0 | Status: DISCONTINUED | OUTPATIENT
Start: 2022-01-01 | End: 2022-01-01

## 2022-01-01 RX ORDER — SODIUM BICARBONATE 1 MEQ/ML
0.29 SYRINGE (ML) INTRAVENOUS
Qty: 150 | Refills: 0 | Status: DISCONTINUED | OUTPATIENT
Start: 2022-01-01 | End: 2022-01-01

## 2022-01-01 RX ORDER — SODIUM CHLORIDE 9 MG/ML
1000 INJECTION INTRAMUSCULAR; INTRAVENOUS; SUBCUTANEOUS ONCE
Refills: 0 | Status: DISCONTINUED | OUTPATIENT
Start: 2022-01-01 | End: 2022-01-01

## 2022-01-01 RX ORDER — NOREPINEPHRINE BITARTRATE/D5W 8 MG/250ML
0.1 PLASTIC BAG, INJECTION (ML) INTRAVENOUS
Qty: 8 | Refills: 0 | Status: DISCONTINUED | OUTPATIENT
Start: 2022-01-01 | End: 2022-01-01

## 2022-01-01 RX ORDER — AZITHROMYCIN 500 MG/1
TABLET, FILM COATED ORAL
Refills: 0 | Status: DISCONTINUED | OUTPATIENT
Start: 2022-01-01 | End: 2022-01-01

## 2022-01-01 RX ORDER — CEFEPIME 1 G/1
1000 INJECTION, POWDER, FOR SOLUTION INTRAMUSCULAR; INTRAVENOUS DAILY
Refills: 0 | Status: DISCONTINUED | OUTPATIENT
Start: 2022-01-01 | End: 2022-01-01

## 2022-01-01 RX ORDER — ACETAMINOPHEN 500 MG
650 TABLET ORAL ONCE
Refills: 0 | Status: DISCONTINUED | OUTPATIENT
Start: 2022-01-01 | End: 2022-01-01

## 2022-01-01 RX ORDER — PANTOPRAZOLE SODIUM 20 MG/1
40 TABLET, DELAYED RELEASE ORAL ONCE
Refills: 0 | Status: COMPLETED | OUTPATIENT
Start: 2022-01-01 | End: 2022-01-01

## 2022-01-01 RX ORDER — ATORVASTATIN CALCIUM 80 MG/1
10 TABLET, FILM COATED ORAL DAILY
Refills: 0 | Status: DISCONTINUED | OUTPATIENT
Start: 2022-01-01 | End: 2022-01-01

## 2022-01-01 RX ORDER — ACETAMINOPHEN 500 MG
650 TABLET ORAL ONCE
Refills: 0 | Status: COMPLETED | OUTPATIENT
Start: 2022-01-01 | End: 2022-01-01

## 2022-01-01 RX ORDER — ACETAMINOPHEN 500 MG
650 TABLET ORAL EVERY 6 HOURS
Refills: 0 | Status: DISCONTINUED | OUTPATIENT
Start: 2022-01-01 | End: 2022-01-01

## 2022-01-01 RX ORDER — HEPARIN SODIUM 5000 [USP'U]/ML
5000 INJECTION INTRAVENOUS; SUBCUTANEOUS EVERY 12 HOURS
Refills: 0 | Status: DISCONTINUED | OUTPATIENT
Start: 2022-01-01 | End: 2022-01-01

## 2022-01-01 RX ORDER — AZITHROMYCIN 500 MG/1
500 TABLET, FILM COATED ORAL EVERY 24 HOURS
Refills: 0 | Status: DISCONTINUED | OUTPATIENT
Start: 2022-01-01 | End: 2022-01-01

## 2022-01-01 RX ORDER — CEFEPIME 1 G/1
INJECTION, POWDER, FOR SOLUTION INTRAMUSCULAR; INTRAVENOUS
Refills: 0 | Status: DISCONTINUED | OUTPATIENT
Start: 2022-01-01 | End: 2022-01-01

## 2022-01-01 RX ORDER — SODIUM CHLORIDE 9 MG/ML
1300 INJECTION, SOLUTION INTRAVENOUS ONCE
Refills: 0 | Status: COMPLETED | OUTPATIENT
Start: 2022-01-01 | End: 2022-01-01

## 2022-01-01 RX ORDER — CEFTRIAXONE 500 MG/1
1000 INJECTION, POWDER, FOR SOLUTION INTRAMUSCULAR; INTRAVENOUS ONCE
Refills: 0 | Status: COMPLETED | OUTPATIENT
Start: 2022-01-01 | End: 2022-01-01

## 2022-01-01 RX ADMIN — SODIUM CHLORIDE 1000 MILLILITER(S): 9 INJECTION INTRAMUSCULAR; INTRAVENOUS; SUBCUTANEOUS at 21:24

## 2022-01-01 RX ADMIN — Medication 80 MEQ/KG/HR: at 14:19

## 2022-01-01 RX ADMIN — ALBUTEROL 2 PUFF(S): 90 AEROSOL, METERED ORAL at 02:00

## 2022-01-01 RX ADMIN — CEFTRIAXONE 100 MILLIGRAM(S): 500 INJECTION, POWDER, FOR SOLUTION INTRAMUSCULAR; INTRAVENOUS at 21:11

## 2022-01-01 RX ADMIN — AZITHROMYCIN 255 MILLIGRAM(S): 500 TABLET, FILM COATED ORAL at 15:48

## 2022-01-01 RX ADMIN — Medication 650 MILLIGRAM(S): at 20:03

## 2022-01-01 RX ADMIN — CEFEPIME 100 MILLIGRAM(S): 1 INJECTION, POWDER, FOR SOLUTION INTRAMUSCULAR; INTRAVENOUS at 14:18

## 2022-01-01 RX ADMIN — MEROPENEM 100 MILLIGRAM(S): 1 INJECTION INTRAVENOUS at 23:11

## 2022-01-01 RX ADMIN — HEPARIN SODIUM 5000 UNIT(S): 5000 INJECTION INTRAVENOUS; SUBCUTANEOUS at 17:59

## 2022-01-01 RX ADMIN — ALBUTEROL 2 PUFF(S): 90 AEROSOL, METERED ORAL at 20:00

## 2022-01-01 RX ADMIN — Medication 650 MILLIGRAM(S): at 18:30

## 2022-01-01 RX ADMIN — AZITHROMYCIN 255 MILLIGRAM(S): 500 TABLET, FILM COATED ORAL at 21:11

## 2022-01-01 RX ADMIN — Medication 3.87 MICROGRAM(S)/KG/MIN: at 02:18

## 2022-01-01 RX ADMIN — HEPARIN SODIUM 5000 UNIT(S): 5000 INJECTION INTRAVENOUS; SUBCUTANEOUS at 06:03

## 2022-01-01 RX ADMIN — Medication 7.74 MICROGRAM(S)/KG/MIN: at 03:49

## 2022-01-01 RX ADMIN — HEPARIN SODIUM 5000 UNIT(S): 5000 INJECTION INTRAVENOUS; SUBCUTANEOUS at 06:23

## 2022-01-01 RX ADMIN — Medication 100 MILLIGRAM(S): at 23:10

## 2022-01-01 RX ADMIN — SODIUM CHLORIDE 1300 MILLILITER(S): 9 INJECTION, SOLUTION INTRAVENOUS at 17:32

## 2022-01-01 RX ADMIN — PANTOPRAZOLE SODIUM 40 MILLIGRAM(S): 20 TABLET, DELAYED RELEASE ORAL at 01:51

## 2022-01-01 RX ADMIN — Medication 7.74 MICROGRAM(S)/KG/MIN: at 06:00

## 2022-06-10 NOTE — PATIENT PROFILE ADULT - HOW PATIENT ADDRESSED, PROFILE
Renetta Complex Repair And Transposition Flap Text: The defect edges were debeveled with a #15 scalpel blade.  The primary defect was closed partially with a complex linear closure.  Given the location of the remaining defect, shape of the defect and the proximity to free margins a transposition flap was deemed most appropriate for complete closure of the defect.  Using a sterile surgical marker, an appropriate advancement flap was drawn incorporating the defect and placing the expected incisions within the relaxed skin tension lines where possible.    The area thus outlined was incised deep to adipose tissue with a #15 scalpel blade.  The skin margins were undermined to an appropriate distance in all directions utilizing iris scissors.

## 2022-09-13 NOTE — ED PROVIDER NOTE - CLINICAL SUMMARY MEDICAL DECISION MAKING FREE TEXT BOX
Labs with low WBC and elevated lactate; CXR with b/l infiltrates but overall improved from prior, urine also nitrite positive. Covered with ceftriaxone and azithro to cover both possible sources, BP holding with initial bolus, repeat lactate pending on admission, intpt team to follow.

## 2022-09-13 NOTE — GOALS OF CARE CONVERSATION - ADVANCED CARE PLANNING - WHAT MATTERS MOST
Patient should receive IV fluids, Levophed and antibiotics for her acute management but avoid aggressive managements

## 2022-09-13 NOTE — H&P ADULT - ASSESSMENT
89y F PMH of Dementia, HTN, DLD, CHF, Breast CA presents for eval of weakness. Patient is NON Verbal so Hx was obtained from daughter. According to daughter, Patient appears weaker than usual yesterday while using walker to ambulate. Today, Patient has been too weak to ambulate at all with associated decreased PO intake and nasal congestion.    # Sepsis  - Tachycardia, Febrile  - Positive U/A, CXR showed infiltrates,  - Elevated lactate, Low WBC of 2k  - IV Fluids started,   - IV meropenem, Rocephin, Azithromycin  - On IV N/S bolus,  - Urine and blood culture ordered  - Hypotensive episode of     standing order of Levophed    # Dementia    # Pneumonia    # Hypertension  # Hyperlipemia      Diet: DASH  DVT PPX:  Code:    89y F PMH of Dementia, HTN, DLD, CHF, Breast CA presents for eval of weakness. Patient is NON Verbal so Hx was obtained from daughter. According to daughter, Patient appears weaker than usual yesterday while using walker to ambulate. Today, Patient has been too weak to ambulate at all with associated decreased PO intake and nasal congestion.    # Sepsis  - Tachycardia, Febrile, Hypotensive episode during the ED  - Positive U/A, CXR showed infiltrates,  - Elevated lactate, Low WBC of 2k  - On IV N/S bolus,  - Urine and blood culture ordered  - Hypotensive episode in ED, standing order of levophed  - patient admitted to step down    # Pneumonia  - CXR showed Right lower lobe infiltrated  - Most likely aspiration pneumonia  - Received a dose of Rocephin and Azithro in ED  - Patient started on Meropenum and Vanco  - Hx of COPD with admissions for acute respiratory failure  - Speech and swallow consult  - ID consult ordered    # KATINA on CKD  - BUN of 51, Creat 3, GFR of 14  - Started on IV fluids  - F/u with morning labs     # COPD with respiratory acidosis  - Albuterol at home PRN  - Respiratory acidosis on admission with PH of 7.28, CO2 of 54  - Negative respiratory panel  - On High flow oxygen    # Dementia  - Not awake, alert, oriented  - Non verbal    # Hypertension  - Enalapril (dose not confirmed yet as pharmacy is not responding)  - Med rec incomplete    # Hyperlipemia  - Atorvastatin      Diet: NPO for now  DVT PPX:   GI PPx; Protonix  Code: DNR/DNI   89y F PMH of Dementia, HTN, DLD, CHF, Breast CA presents for eval of weakness. Patient is NON Verbal so Hx was obtained from daughter. According to daughter, Patient appears weaker than usual yesterday while using walker to ambulate. Today, Patient has been too weak to ambulate at all with associated decreased PO intake and nasal congestion.  #Med rec not complete. Please verify medrec with pharmacy [phone]    # Sepsis  - Tachycardia 98, Febrile of 100.2, Hypotensive, hypoxic on 6 L of NC episode during the ED,  - Positive U/A with nitrates and LE, CXR showed infiltrates in RLL,  - Elevated lactate 3.1 to 6 , Low WBC of 2k  - On IV N/S bolus 2.3 L, monitor urine output  - Urine and blood culture ordered  - Hypotensive episode in ED, standing order of levophed  - patient admitted to step down    # Pneumonia  - CXR showed Right lower lobe infiltrated  - Most likely aspiration pneumonia  - Received a dose of Rocephin and Azithro in ED  - Lactate trended upwards started her on broad spectrum  - Patient started on Meropenum and Vanco  - Hx of COPD with admissions for acute respiratory failure  - f/u Speech and swallow consult  - f/u ID consult ordered    # KATINA on CKD  - BUN of 51, Creat 3, GFR of 14  - Started on IV fluids  - F/u with morning labs     # COPD with respiratory acidosis  - Albuterol at home PRN  - Respiratory acidosis on admission with PH of 7.28, CO2 of 54  - Negative respiratory panel  - On High flow oxygen 6L    # CHF?  - Previous ECHO from 2019 showed normal EF  - No clinical signs of volume overload  - F/U with morning TTE    # Dementia  - Not awake, alert, oriented  - Non verbal    # Hypertension  - Enalapril (dose not confirmed yet as pharmacy is not responding)  - Please hold all BP medications for now  - Monitor vitals Q4    # Hyperlipemia  - Atorvastatin      Diet: NPO for now  DVT PPX: Heparin Sub Q 5000  GI PPx; Protonix  Code: DNR/DNI

## 2022-09-13 NOTE — ED ADULT NURSE REASSESSMENT NOTE - NS ED NURSE REASSESS COMMENT FT1
MAR / admitting resident at the patients  bedside and aware of the patients low sat and low BP . Pt upgraded now from medicine to stepdown unit . Pt daughter at the bedside updated with patients status

## 2022-09-13 NOTE — H&P ADULT - NSHPLABSRESULTS_GEN_ALL_CORE
9.2    2.40  )-----------( 131      ( 13 Sep 2022 16:17 )             28.8         142  |  105  |  51<H>  ----------------------------<  110<H>  4.8   |  24  |  3.0<H>    Ca    9.3      13 Sep 2022 17:19  Mg     2.5         TPro  6.5  /  Alb  3.9  /  TBili  0.4  /  DBili  x   /  AST  31  /  ALT  9   /  AlkPhos  63            Urinalysis Basic - ( 13 Sep 2022 18:56 )    Color: Yellow / Appearance: Slightly Turbid / S.016 / pH: x  Gluc: x / Ketone: Negative  / Bili: Negative / Urobili: <2 mg/dL   Blood: x / Protein: 30 mg/dL / Nitrite: Positive   Leuk Esterase: Large / RBC: 1 /HPF / WBC 30 /HPF   Sq Epi: x / Non Sq Epi: 0 /HPF / Bacteria: Many      PT/INR - ( 13 Sep 2022 17:19 )   PT: 13.30 sec;   INR: 1.16 ratio         PTT - ( 13 Sep 2022 17:19 )  PTT:30.3 sec  Lactate Trend   @ 18:56 Lactate:6.1         CAPILLARY BLOOD GLUCOSE

## 2022-09-13 NOTE — ED ADULT NURSE NOTE - OBJECTIVE STATEMENT
pt brought in by daughter for weakness and poor appetite. as per daughter, pt has dementia and barely speaks at baseline. however, these past 2 days pt unable to use walker and decreased appetite.

## 2022-09-13 NOTE — GOALS OF CARE CONVERSATION - ADVANCED CARE PLANNING - CONVERSATION DETAILS
I and Dr Stewart talked to the daughter at bedside about the goals of care. Patient already has a signed DNR/DNI form in the system. She was informed that patient has a poor prognosis as patient is septic and she is on appropriate treatment for it. The daughter mentioned that they don't want aggressive measures like central line and femoral line incase she crashes. Furthur, goals of care to be discussed with the family by Palliative care team. I and Dr Stewart talked to the daughter Alberto at bedside about the goals of care. Patient already has a signed DNR/DNI form in the system. She was informed that patient has a poor prognosis as patient is septic and she is on appropriate treatment for it. The daughter mentioned that they don't want aggressive measures like central line and femoral line incase she crashes. Furthur, goals of care to be discussed with the family by Palliative care team.

## 2022-09-13 NOTE — H&P ADULT - ATTENDING COMMENTS
HPI:  89y F PMH of Dementia, HTN, DLD, CHF, Breast CA presents for eval of weakness. Patient is NON Verbal so Hx was obtained from daughter. According to daughter, Patient appears weaker than usual yesterday while using walker to ambulate. Today, Patient has been too weak to ambulate at all with associated decreased PO intake and nasal congestion. I am unable to obtain a comprehensive history, review of systems, past medical history, and/or physical exam due to constraints imposed by the patient's clinical condition and/or mental status.    In ED vitals: /55, HR 98, RR 18, Temp 100.8, Sat 99%  Labs: WBC 2.4, Hg 9.2, .5, Lactate 6.1, BUN 51, Creat 3, GFR 14, PH 7.27, PCO2 54, U/A positive, Respiratory panel negative  CXR showed infiltrates more on the right lower lobe (13 Sep 2022 22:17)    REVIEW OF SYSTEMS: see cc/HPI   CONSTITUTIONAL: No weakness, fevers or chills  EYES/ENT: No visual changes;  No vertigo or throat pain   NECK: No pain or stiffness  RESPIRATORY: No cough, wheezing, hemoptysis; No shortness of breath  CARDIOVASCULAR: No chest pain or palpitations  GASTROINTESTINAL: No abdominal or epigastric pain. No nausea, vomiting, or hematemesis; No diarrhea or constipation. No melena or hematochezia.  GENITOURINARY: No dysuria, frequency or hematuria  NEUROLOGICAL: No numbness or weakness  SKIN: No itching, rashes    Physical Exam:   General: WN/WD NAD  Neurology: A&Ox3, nonfocal, follows commands  Eyes: PERRLA/ EOMI  ENT/Neck: Neck supple, trachea midline, No JVD  Respiratory: CTA B/L, No wheezing, rales, rhonchi  CV: Normal rate regular rhythm, S1S2, no murmurs, rubs or gallops  Abdominal: Soft, NT, ND +BS,   Extremities: No edema, + peripheral pulses  Skin: No Rashes, Hematoma, Ecchymosis    A/p  Septic shock - not responding adequately to IV fluids and at risk for fluid overload   Urosepsis   -Admit to SDU ( approved)  -IV Levophed   -Is and Os   -IV abx   -check blood Cx and Ucx     Acute resp failure 2/2 pneumonia - suspected gram negative r/o aspiration   HFpEF / pulm HTN   -IV abx   -BiPAP w/ O2 support and pulse ox monitoring   -ABG in AM     KATINA on CKD   -IV fluids   -Is and Os    COPD   -BiPAP w/ O2  -Duonebs     Dementia - safety     Dyslipidemia   -statin     GI prophylaxis - PPI HPI:  89y F PMH of Dementia, HTN, DLD, CHF, Breast CA presents for eval of weakness. Patient is NON Verbal so Hx was obtained from daughter. According to daughter, Patient appears weaker than usual yesterday while using walker to ambulate. Today, Patient has been too weak to ambulate at all with associated decreased PO intake and nasal congestion. I am unable to obtain a comprehensive history, review of systems, past medical history, and/or physical exam due to constraints imposed by the patient's clinical condition and/or mental status.    In ED vitals: /55, HR 98, RR 18, Temp 100.8, Sat 99%  Labs: WBC 2.4, Hg 9.2, .5, Lactate 6.1, BUN 51, Creat 3, GFR 14, PH 7.27, PCO2 54, U/A positive, Respiratory panel negative  CXR showed infiltrates more on the right lower lobe (13 Sep 2022 22:17)    REVIEW OF SYSTEMS: see cc/HPI   CONSTITUTIONAL: No weakness, fevers or chills  EYES/ENT: No visual changes;  No vertigo or throat pain   NECK: No pain or stiffness  RESPIRATORY: No cough, wheezing, hemoptysis; No shortness of breath  CARDIOVASCULAR: No chest pain or palpitations  GASTROINTESTINAL: No abdominal or epigastric pain. No nausea, vomiting, or hematemesis; No diarrhea or constipation. No melena or hematochezia.  GENITOURINARY: No dysuria, frequency or hematuria  NEUROLOGICAL: No numbness or weakness  SKIN: No itching, rashes    Physical Exam:   General: WN/WD NAD  Neurology: A&Ox3, nonfocal, follows commands  Eyes: PERRLA/ EOMI  ENT/Neck: Neck supple, trachea midline, No JVD  Respiratory: CTA B/L, No wheezing, rales, rhonchi  CV: Normal rate regular rhythm, S1S2, no murmurs, rubs or gallops  Abdominal: Soft, NT, ND +BS,   Extremities: No edema, + peripheral pulses  Skin: No Rashes, Hematoma, Ecchymosis    A/p  Septic shock - not responding adequately to IV fluids and at risk for fluid overload   Urosepsis   -Admit to SDU ( approved)  -IV Levophed   -Is and Os   -IV abx   -check blood Cx and Ucx     Acute resp failure 2/2 pneumonia - suspected gram negative r/o aspiration   HFpEF / pulm HTN   -IV abx   -BiPAP w/ O2 support and pulse ox monitoring   -ABG in AM     KATINA on CKD   -IV fluids   -Is and Os    COPD   -BiPAP w/ O2  -Duonebs     Dementia - safety     Dyslipidemia   -statin     GI prophylaxis - PPI    Seen by Attending 09/13/22 ( note revised 09/14)

## 2022-09-13 NOTE — H&P ADULT - HISTORY OF PRESENT ILLNESS
89y F PMH of Dementia, HTN, DLD, CHF, Breast CA presents for eval of weakness. Patient is NON Verbal so Hx was obtained from daughter. According to daughter, Patient appears weaker than usual yesterday while using walker to ambulate. Today, Patient has been too weak to ambulate at all with associated decreased PO intake and nasal congestion. I am unable to obtain a comprehensive history, review of systems, past medical history, and/or physical exam due to constraints imposed by the patient's clinical condition and/or mental status  In ED vitals: /55, HR 98, RR 18, Temp 100.8, Sat 99%  Labs: 89y F PMH of Dementia, HTN, DLD, CHF, Breast CA presents for eval of weakness. Patient is NON Verbal so Hx was obtained from daughter. According to daughter, Patient appears weaker than usual yesterday while using walker to ambulate. Today, Patient has been too weak to ambulate at all with associated decreased PO intake and nasal congestion. I am unable to obtain a comprehensive history, review of systems, past medical history, and/or physical exam due to constraints imposed by the patient's clinical condition and/or mental status.    In ED vitals: /55, HR 98, RR 18, Temp 100.8, Sat 99%  Labs: WBC 2.4, Hg 9.2, .5, Lactate 6.1, BUN 51, Creat 3, GFR 14, PH 7.27, PCO2 54, U/A positive, Respiratory panel negative  CXR showed infiltrates more on the right lower lobe

## 2022-09-13 NOTE — ED ADULT NURSE REASSESSMENT NOTE - NS ED NURSE REASSESS COMMENT FT1
[facility]    Progress Note    Herminia Ruggiero Patient Status:  Inpatient    1970 MRN [id number]   Location [address] [facility] Attending Maida Granda MD   Hosp Day # 1 PCP Jorge Dooley MD        Subjective:   Mauricio Postal JOHN barton aware IV placed by US not patent. awaiting for US I for IVAB and to continue IVF. CREATSERUM 0.95 10/02/2020    BUN 14 10/02/2020     (L) 10/02/2020    K 3.6 10/02/2020     10/02/2020    CO2 26.0 10/02/2020     (H) 10/02/2020    CA 8.0 (L) 10/02/2020    ALB 2.6 (L) 10/01/2020    ALKPHO 60 10/01/2020    BILT 0.3 10/01/

## 2022-09-13 NOTE — ED PROVIDER NOTE - ATTENDING APP SHARED VISIT CONTRIBUTION OF CARE
89-year-old woman, history of hypertension, hyperlipidemia, CHF/pulmonary hypertension, breast CA, currently receiving "injections" with oncology Dr. Dutton, dementia, minimally verbal at baseline, brought in by daughter and aide 2/2 generalized weakness, poor p.o. intake, increased somnolence since last night.  Normally patient can walk with a walker, this morning was unable to, needed total assist for ADLs.  On exam she is febrile, drowsy but arousable and smiles on approach.  Neck supple, lungs clear, CV S1-S2, abdomen soft, no apparent tenderness.  No peripheral edema, no rash.  Concern for sepsis, likely urine, labs, antibiotics, fluid resuscitation, admit.

## 2022-09-13 NOTE — ED PROVIDER NOTE - OBJECTIVE STATEMENT
89y F pmh Dementia, HTN, DLD, CHF, Breast CA presents for eval of weakness. As per daughter pt appears weaker than usual yesterday while using walker to ambulate. Today pt has been too weak to ambulate at all with associated decreased PO intake and nasal congestion. I am unable to obtain a comprehensive history, review of systems, past medical history, and/or physical exam due to constraints imposed by the patient's clinical condition and/or mental status.

## 2022-09-13 NOTE — ED PROVIDER NOTE - NS ED ATTENDING STATEMENT MOD
This was a shared visit with the SHIELA. I reviewed and verified the documentation and independently performed the documented:

## 2022-09-13 NOTE — H&P ADULT - NSHPPHYSICALEXAM_GEN_ALL_CORE
GENERAL: NAD, lying in bed comfortably  HEAD:  Atraumatic, Normocephalic  EYES: conjunctiva and sclera clear  ENT: Moist mucous membranes  NECK: Supple, No JVD  CHEST/LUNG: Clear to auscultation bilaterally; No rales, rhonchi, wheezing, or rubs. Unlabored respirations  HEART: Regular rate and rhythm; No murmurs, rubs, or gallops  ABDOMEN: Soft, nontender, nondistended  EXTREMITIES:  No clubbing, cyanosis, or edema  NERVOUS SYSTEM:  A&Ox3 ENT: Moist mucous membranes  CHEST/LUNG: HF Nasal Canula  NERVOUS SYSTEM: Not awake, alert, oriented  Extremities: Patient has no B/L LE edema  Full PE cannot be performed as patient is septic

## 2022-09-13 NOTE — ED ADULT NURSE REASSESSMENT NOTE - NS ED NURSE REASSESS COMMENT FT1
admitting MD at the bedside and evaluated the pt and okay to send the pt to medicine floor ,MD aware of pts low BP

## 2022-09-13 NOTE — ED PROVIDER NOTE - NSTIMEPROVIDERCAREINITIATE_GEN_ER
13-Sep-2022 14:06 Imiquimod Counseling:  I discussed with the patient the risks of imiquimod including but not limited to erythema, scaling, itching, weeping, crusting, and pain.  Patient understands that the inflammatory response to imiquimod is variable from person to person and was educated regarded proper titration schedule.  If flu-like symptoms develop, patient knows to discontinue the medication and contact us.

## 2022-09-13 NOTE — ED ADULT NURSE NOTE - NSIMPLEMENTINTERV_GEN_ALL_ED
Implemented All Fall with Harm Risk Interventions:  [place] to call system. Call bell, personal items and telephone within reach. Instruct patient to call for assistance. Room bathroom lighting operational. Non-slip footwear when patient is off stretcher. Physically safe environment: no spills, clutter or unnecessary equipment. Stretcher in lowest position, wheels locked, appropriate side rails in place. Provide visual cue, wrist band, yellow gown, etc. Monitor gait and stability. Monitor for mental status changes and reorient to person, place, and time. Review medications for side effects contributing to fall risk. Reinforce activity limits and safety measures with patient and family. Provide visual clues: red socks.

## 2022-09-13 NOTE — ED ADULT TRIAGE NOTE - CHIEF COMPLAINT QUOTE
Patient bibems from home in NAD awake and alert, baseline dementia. As per daughter pt "more sleepy and weak over last 2 days". Oral temp 100.2 in triage. Denies chest pain, SOB, n/v/d, urinary symptoms

## 2022-09-13 NOTE — ED PROVIDER NOTE - PHYSICAL EXAMINATION
CONST: NAD, oral temp 100.2F  EYES: Sclera and conjunctiva clear.   ENT: Clear nasal discharge. Oropharynx normal appearing.   NECK: Non-tender, no meningeal signs. normal ROM. supple   CARD: S1 S2; No jvd  RESP: Equal BS B/L, No wheezes, rhonchi or rales. No distress  GI: Soft, non-tender, non-distended. no cva tenderness. normal BS  MS: Normal ROM in all extremities. pulses 2 +. no calf tenderness or swelling  SKIN: Warm, dry, no acute rashes. Good turgor  NEURO: Nonverbal. Interactive on exam. Strength 5/5

## 2022-09-14 NOTE — PROGRESS NOTE ADULT - CONVERSATION DETAILS
Further GOC d/w patient's daughter. She is aware that pt is in a critical condition. Pt is DNR/DNI, and she does not want invasive procedures such as a central line or NGT. For now, as pt is stable, she would like to continue IV abx and peripheral pressors. If patient is to further deteriorate, will discuss about comfort measures.

## 2022-09-14 NOTE — CONSULT NOTE ADULT - PROBLEM SELECTOR RECOMMENDATION 2
at baseline poor PO intake, nonverbal   would be hospice appropriate if patient makes  it home  continue current med mgmt    - Recommend non-pharmacological interventions to prevent/treat delirium  - maintain day/night light cycles  - optimize sleep-wake cycle, minimize environmental noise  - reorientation frequently  - use verbal redirection as first line  - minimize restraints and lines  - ensure good bladder/bowel function  - ensure adequate pain control  - minimize use of anticholinergic, antihistaminic, and benzodiazepine medications

## 2022-09-14 NOTE — ED ADULT NURSE REASSESSMENT NOTE - NS ED NURSE REASSESS COMMENT FT1
Admitting resident at the bedside aware of total urine output for the whole night 45 cc  only , no new order given , MD aware of lactic acid result

## 2022-09-14 NOTE — PROGRESS NOTE ADULT - SUBJECTIVE AND OBJECTIVE BOX
KYRA MENDIOLA  89y, Female  Allergy: No Known Allergies    Hospital Day: 1d    Patient seen and examined. No acute events overnight    PMH/PSH:  PAST MEDICAL & SURGICAL HISTORY:  HTN (hypertension)      Diastolic dysfunction      HLD (hyperlipidemia)      H/O bilateral hip replacements  Right Hip ORIF on Xray      History of cholecystectomy          VITALS:  T(F): 98.2 (22 @ 08:52), Max: 100.2 (22 @ 13:55)  HR: 97 (22 @ 13:00)  BP: 103/57 (22 @ 13:00) (79/44 - 146/83)  RR: 20 (22 @ 08:52)  SpO2: 96% (22 @ 08:52)    TESTS & MEASUREMENTS:  Weight (Kg): 41.3 (22 @ 13:55)  BMI (kg/m2): 17.8 ()    22 @ 07:01  -  22 @ 07:00  --------------------------------------------------------  IN: 0 mL / OUT: 645 mL / NET: -645 mL                            9.3    1.99  )-----------( 155      ( 14 Sep 2022 06:31 )             28.5     PT/INR - ( 13 Sep 2022 17:19 )   PT: 13.30 sec;   INR: 1.16 ratio         PTT - ( 13 Sep 2022 17:19 )  PTT:30.3 sec      142  |  112<H>  |  51<H>  ----------------------------<  104<H>  5.1<H>   |  13<L>  |  2.9<H>    Ca    7.6<L>      14 Sep 2022 05:00  Mg     1.9         TPro  4.9<L>  /  Alb  2.8<L>  /  TBili  0.5  /  DBili  x   /  AST  59<H>  /  ALT  12  /  AlkPhos  46  09-14    LIVER FUNCTIONS - ( 14 Sep 2022 00:50 )  Alb: 2.8 g/dL / Pro: 4.9 g/dL / ALK PHOS: 46 U/L / ALT: 12 U/L / AST: 59 U/L / GGT: x                 Urinalysis Basic - ( 13 Sep 2022 18:56 )    Color: Yellow / Appearance: Slightly Turbid / S.016 / pH: x  Gluc: x / Ketone: Negative  / Bili: Negative / Urobili: <2 mg/dL   Blood: x / Protein: 30 mg/dL / Nitrite: Positive   Leuk Esterase: Large / RBC: 1 /HPF / WBC 30 /HPF   Sq Epi: x / Non Sq Epi: 0 /HPF / Bacteria: Many        RADIOLOGY & ADDITIONAL TESTS:    RECENT DIAGNOSTIC ORDERS:  Blood Gas Arterial - Lytes,iCa,Lact: Routine (22 @ 13:09)  Lactate, Blood: STAT  Addl Info: Draw repeat Lactate, STAT (22 @ 08:46)  Procalcitonin, Serum: Routine (22 @ 00:16)  Diet, NPO (22 @ 00:16)  Culture - Blood: AM Sched. Collection:14-Sep-2022 04:30  Specimen Source: Blood-Venous (22 @ 22:55)  Legionella pneumophila Antigen, Urine: Routine (22 @ 22:55)  Streptococcus pneumoniae Ag, Ur: Routine (22 @ 22:55)  Add On Test-Specimen in Lab: STAT, - trop, mag, bnp  Specimen In Lab. Enter name of test required in Special Instructions.  This is for informational purposes only and will not receive results. Laboratory staff will enter new orders for the requested add on test and that order will receive results. (22 @ 14:43)  Serum Pro-Brain Natriuretic Peptide: STAT (22 @ 14:42)  Troponin T, Serum: STAT (22 @ 14:42)  Culture - Urine: Routine  Specimen Source: Clean Catch (Midstream) (22 @ 14:23)  Culture - Blood: Routine  Specimen Source: Blood-Peripheral  Addl Info: Peripheral Site 2 (22 @ 14:23)  Culture - Blood: Routine  Specimen Source: Blood-Peripheral  Addl Info: Peripheral Site 1 (22 @ 14:23)      MEDICATIONS:  MEDICATIONS  (STANDING):  ALBUTerol    90 MICROgram(s) HFA Inhaler 2 Puff(s) Inhalation every 6 hours  atorvastatin Oral Tab/Cap - Peds 10 milliGRAM(s) Oral daily  cefepime   IVPB      cefepime   IVPB 1000 milliGRAM(s) IV Intermittent once  heparin   Injectable 5000 Unit(s) SubCutaneous every 12 hours  norepinephrine Infusion 0.1 MICROgram(s)/kG/Min (7.74 mL/Hr) IV Continuous <Continuous>  sodium bicarbonate  Infusion 0.291 mEq/kG/Hr (80 mL/Hr) IV Continuous <Continuous>    MEDICATIONS  (PRN):  acetaminophen     Tablet .. 650 milliGRAM(s) Oral every 6 hours PRN Temp greater or equal to 38C (100.4F), Mild Pain (1 - 3)      HOME MEDICATIONS:  atorvastatin 10 mg oral tablet ()  ferrous sulfate 325 mg (65 mg elemental iron) oral tablet ()  ipratropium-albuterol 0.5 mg-2.5 mg/3 mLinhalation solution ()  megestrol 40 mg/mL oral suspension ()  Multiple Vitamins with Minerals oral tablet ()  polyethylene glycol 3350 oral powder for reconstitution ()  senna oral tablet ()      REVIEW OF SYSTEMS:  All other review of systems is negative unless indicated above.     PHYSICAL EXAM:  PHYSICAL EXAM:  GENERAL: NAD, well-developed  HEAD:  Atraumatic, Normocephalic  NECK: Supple, No JVD  CHEST/LUNG: bl crackles  HEART: Regular rate and rhythm; No murmurs, rubs, or gallops  ABDOMEN: Soft, Nontender, Nondistended; Bowel sounds present  EXTREMITIES:  2+ Peripheral Pulses, No clubbing, cyanosis, or edema  SKIN: No rashes or lesions

## 2022-09-14 NOTE — CONSULT NOTE ADULT - ASSESSMENT
ASSESSMENT  89y F PMH of Dementia, HTN, DLD, CHF, Breast CA presents for eval of weakness. Patient is NON Verbal so Hx was obtained from daughter. According to daughter, Patient appears weaker than usual yesterday while using walker to ambulate. Today, Patient has been too weak to ambulate at all with associated decreased PO intake and nasal congestion. ID consulted for UTI and PNA    IMPRESSION  #Rule out septic shock requiring pressors Tm 100.2 P>90 WBC <4, low ALC     elevated , Tbili wnl thus doubt hemolysis / tickborne    UA not much pyuria  Blood: x / Protein: 30 mg/dL / Nitrite: Positive   Leuk Esterase: Large / RBC: 1 /HPF / WBC 30 /HPF   Sq Epi: x / Non Sq Epi: 0 /HPF / Bacteria: Many    CX Right perihilar opacity.  #Lactic acidosis  #Severe protein calorie malnutrition  BMI (kg/m2): 17.8  #KATINA   Creatinine, Serum: 2.9 (09-14-22 @ 05:00)    Weight (kg): 41.3 (09-13-22 @ 13:55)    RECOMMENDATIONS  This is an incomplete consult note. All final recommendations to follow after interview and examination of the patient. Please follow recommendations noted below.    If any questions, please call or send a message on [employer] Teams  Please continue to update ID with any pertinent new laboratory or radiographic findings  Spectra [phone]   ASSESSMENT  89y F PMH of Dementia, HTN, DLD, CHF, Breast CA presents for eval of weakness. Patient is NON Verbal so Hx was obtained from daughter. According to daughter, Patient appears weaker than usual yesterday while using walker to ambulate. Today, Patient has been too weak to ambulate at all with associated decreased PO intake and nasal congestion. ID consulted for UTI and PNA    IMPRESSION  #Rule out septic shock requiring pressors Tm 100.2 P>90 WBC <4, low ALC     elevated , Tbili wnl thus doubt hemolysis / tickborne    UA not much pyuria  Blood: x / Protein: 30 mg/dL / Nitrite: Positive   Leuk Esterase: Large / RBC: 1 /HPF / WBC 30 /HPF   Sq Epi: x / Non Sq Epi: 0 /HPF / Bacteria: Many    CX Right perihilar opacity. Possible PNA   #Lactic acidosis  #Severe protein calorie malnutrition  BMI (kg/m2): 17.8  #KATINA   Creatinine, Serum: 2.9 (09-14-22 @ 05:00)    Weight (kg): 41.3 (09-13-22 @ 13:55)    RECOMMENDATIONS  - f/u BCX UCX  - procalcitonin   - As rhinorrhea, send expanded RVP (flu/rsv/covid neg)  - Cefepime 1g q24h IV crcl 9  - HOLD further Vanc dosing, check AM level 9/15  - MRSA nares  - Send urine Ag for Strep and Legionella   - Azithro 500mg q24h IV   - Monitor CBC, low ALC     If any questions, please call or send a message on [employer] Teams  Please continue to update ID with any pertinent new laboratory or radiographic findings  Spectra [phone]

## 2022-09-14 NOTE — ED ADULT NURSE REASSESSMENT NOTE - NS ED NURSE REASSESS COMMENT FT1
informed Dr. Manning of patients blood BP , SBP  80s to 90s . Per Dr. Manning , start Levophed drip now

## 2022-09-14 NOTE — CHART NOTE - NSCHARTNOTEFT_GEN_A_CORE
PALLIATIVE MEDICINE INTERDISCIPLINARY TEAM NOTE     Provider:                                         [   ] Initial visit        Met with: [ X ] Patient  [   ] Family  [   ] Other:    Primary Language: [ X] English [   ] Other*:                      *Interpretation provided by:    SUPPORT DIAGNOSES            (Check all that apply)    [  X ] EOL issues  [  X ] Advanced Illness  [   ] Cultural / spiritual concerns  [   ] Pain / suffering  [  X ] Dementia / AMS  [   ] Other:  [   ] AD issues  [   ] Grief / loss / sadness  [   ] Discharge issues  [   ] Distress / coping    PSYCHOSOCIAL ASSESSMENT OF PATIENT         (Check all that apply)    [  X ] Initial Assessment            [   ] Reassessment          [   ] Not Applicable this visit    Pain/suffering acuity:  [   ] None to mild (0-3)           [  X ] Moderate (4-6)        [   ] High (7-10)    Mental Status:  [   ] Alert/oriented (x3)          [ X ] Confused/Altered(x2/x1)         [   ] Non-resp    Functional status:  [   ] Independent w ADLs      [  X ] Needs Assistance             [   ] Bedbound/Full Care    Coping:  [   ] Coping well                     [ X  ] Coping w/difficulty            [   ] Poor coping    Support system:  [   ] Strong                              [  X ] Adequate                        [   ] Inadequate      Past history and medications for:     [ ] Anxiety       [ ] Depression    [ ] Sleep disorders       SERVICE PROVIDED                                                                                              [   ]Discharge support / facilitation  [   ]AD / goals of care counseling                                  [   ]EOL / death / bereavement counseling  [ X  ]Counseling / support                                                [   ] Family meeting  [   ]Prayer / sacrament / ritual                                      [   ] Referral   [   ]Other                                                                       NOTE and Plan of Care (PoC): Patient is an 88 y/o Female. Patient has PMH of dementia, HTL, DLD, Breast CA presents for eval of weakness. Patient is non-verbal. Team visited patient earlier today, patient was in bed on BiPap and pressers. Client looked comfortable. Team called nazia Galeano, to introduce ourselves and answer any questions. Next steps are to monitor patient to see if we can possibly move towards high flow.  will follow up with nazia Galeano to discuss next steps. PALLIATIVE MEDICINE INTERDISCIPLINARY TEAM NOTE     Provider:                                         [   ] Initial visit        Met with: [ X ] Patient  [ X  ] Family  [   ] Other:    Primary Language: [ X] English [   ] Other*:                      *Interpretation provided by:    SUPPORT DIAGNOSES            (Check all that apply)    [  X ] EOL issues  [  X ] Advanced Illness  [   ] Cultural / spiritual concerns  [   ] Pain / suffering  [  X ] Dementia / AMS  [   ] Other:  [   ] AD issues  [   ] Grief / loss / sadness  [   ] Discharge issues  [   ] Distress / coping    PSYCHOSOCIAL ASSESSMENT OF PATIENT         (Check all that apply)    [  X ] Initial Assessment            [   ] Reassessment          [   ] Not Applicable this visit    Pain/suffering acuity:  [   ] None to mild (0-3)           [  X ] Moderate (4-6)        [   ] High (7-10)    Mental Status:  [   ] Alert/oriented (x3)          [ X ] Confused/Altered(x1)         [   ] Non-resp    Functional status:  [   ] Independent w ADLs      [  X ] Needs Assistance             [   ] Bedbound/Full Care    Coping:  [   ] Coping well                     [ X  ] Coping w/difficulty            [   ] Poor coping    Support system:  [   ] Strong                              [  X ] Adequate                        [   ] Inadequate      Past history and medications for:     [ ] Anxiety       [ ] Depression    [ ] Sleep disorders       SERVICE PROVIDED                                                                                              [   ]Discharge support / facilitation  [   ]AD / goals of care counseling                                  [   ]EOL / death / bereavement counseling  [ X  ]Counseling / support                                                [   ] Family meeting  [   ]Prayer / sacrament / ritual                                      [   ] Referral   [   ]Other                                                                       NOTE and Plan of Care (PoC): Patient is an 89 y/o Female. Patient has PMH of dementia, HTL, DLD, Breast CA presents for eval of weakness. Patient is non-verbal. Team visited patient earlier today, patient was in bed on BiPap and pressers. Client looked comfortable. Team called nazia Galeano, to introduce ourselves and answer any questions. Next steps are to monitor patient to see if she can possibly move towards high flow.  will follow up with nazia Galeano to discuss next steps.

## 2022-09-14 NOTE — ED ADULT NURSE REASSESSMENT NOTE - NS ED NURSE REASSESS COMMENT FT1
bedside report given to new primary day shift RN , pt on BIPAP, IVL x  2 intact , vázquez intact , on levophed drip , on continuous cardiac monitor

## 2022-09-14 NOTE — CONSULT NOTE ADULT - CONVERSATION DETAILS
Spoke with dtr Waleska on phone. Introduced palliative role. She describes that the patient at baseline is nonverbal, walks with walker, and does not eat much. She is clear that she does not want invasive procedures for the patient including CPR, intubation, central line, feeding tubes. She is OK with peripheral pressors, abx, IVF, and Bipap. She does fear the Bipap is uncomfortable. Discussed options moving forward including comfort care and bipap removal, which would result in the patient passing away at the hospital. For now, she would like to continue current medical management for now in hopes the patient will be able to be weaned from Bipap in the next few days. We will re-address GOC depending on how she does.

## 2022-09-14 NOTE — CONSULT NOTE ADULT - ASSESSMENT
IMPRESSION:    Sepsis present on admission  septic shock  BL pneumonia  UTI  Renal failure  severe dementia    PLAN:    CNS: Avoid CNS depressant    HEENT:  Oral care    PULMONARY:  HOB @ 45 degrees, NIV/ HHFNC keep Sao2 88 to 92%, aspiration precaution    CARDIOVASCULAR: taper pressors, IVF, per [place] CE, Trend LA    GI: GI prophylaxis                                          Feeding NPO    RENAL:  F/u  lytes.  Correct as needed. accurate I/O, Repeat CMP, renal us    INFECTIOUS DISEASE: swab for MRSA, cefepime/ doxy, procal, pancx    HEMATOLOGICAL:  DVT prophylaxis. LE doppler    ENDOCRINE:  Follow up FS.  Insulin protocol if needed.    very poor prognosis    GOC    Palliative care eval  SDU

## 2022-09-14 NOTE — CONSULT NOTE ADULT - PROBLEM SELECTOR RECOMMENDATION 9
UTi and pna  continue IV abx  continue peripheral pressor support - NO central line   ID following for recommendations UTi and pna  continue IV abx  continue peripheral pressor support - NO central line, high risk, monitor hemodyanmics  ID following for recommendations

## 2022-09-14 NOTE — CONSULT NOTE ADULT - PROBLEM SELECTOR RECOMMENDATION 3
- DNR/DNI  - no central line or invasive procedures  - continue current med mgmt for now  - discussed CMO as an option  - will FU

## 2022-09-14 NOTE — CONSULT NOTE ADULT - SUBJECTIVE AND OBJECTIVE BOX
HPI:    89y F PMH of Dementia, HTN, DLD, CHF, Breast CA presents for eval of weakness. Patient is NON Verbal so Hx was obtained from daughter. According to daughter, Patient appears weaker than usual yesterday while using walker to ambulate. Today, Patient has been too weak to ambulate at all with associated decreased PO intake and nasal congestion. I am unable to obtain a comprehensive history, review of systems, past medical history, and/or physical exam due to constraints imposed by the patient's clinical condition and/or mental status.    In ED vitals: /55, HR 98, RR 18, Temp 100.8, Sat 99%  Labs: WBC 2.4, Hg 9.2, .5, Lactate 6.1, BUN 51, Creat 3, GFR 14, PH 7.27, PCO2 54, U/A positive, Respiratory panel negative  CXR showed infiltrates more on the right lower lobe (13 Sep 2022 22:17)    PERTINENT PM/SXH:   HTN (hypertension)    Diastolic dysfunction    HLD (hyperlipidemia)      H/O bilateral hip replacements    History of cholecystectomy      FAMILY HISTORY:    ITEMS NOT CHECKED ARE NOT PRESENT    SOCIAL HISTORY:   Significant other/partner[ ]  Children[X ]  Hoahaoism/Spirituality:  Substance hx:  [ ]   Tobacco hx:  [ ]   Alcohol hx: [ ]   Living Situation: [X ]Home  [ ]Long term care  [ ]Rehab [ ]Other  Home Services: [ ] HHA [ ] Visting RN [ ] Hospice  Occupation:  Home Opioid hx:  [ ] Y [ ] N [ X] I-Stop Reference No: This report was requested by: Comfort Barton | Reference #: [id number]    ADVANCE DIRECTIVES:    MOLST  [ X]  Living Will  [ ]   DECISION MAKER(s): Waleska Lu dtr  [ ] Health Care Proxy(s)  [ X] Surrogate(s)  [ ] Guardian           Name(s): Phone Number(s): Daughter Waleska     BASELINE (I)ADL(s) (prior to admission):  [place]: [ ]Total  [X ] Moderate [ ]Dependent  Palliative Performance Status Version 2:        40 %    http://npcrc.org/files/news/palliative_performance_scale_ppsv2.pdf    Allergies    No Known Allergies    Intolerances    MEDICATIONS  (STANDING):  ALBUTerol    90 MICROgram(s) HFA Inhaler 2 Puff(s) Inhalation every 6 hours  atorvastatin Oral Tab/Cap - Peds 10 milliGRAM(s) Oral daily  heparin   Injectable 5000 Unit(s) SubCutaneous every 12 hours  norepinephrine Infusion 0.1 MICROgram(s)/kG/Min (7.74 mL/Hr) IV Continuous <Continuous>  sodium zirconium cyclosilicate 10 Gram(s) Oral once  vancomycin  IVPB 500 milliGRAM(s) IV Intermittent every 24 hours    MEDICATIONS  (PRN):  acetaminophen     Tablet .. 650 milliGRAM(s) Oral every 6 hours PRN Temp greater or equal to 38C (100.4F), Mild Pain (1 - 3)    PRESENT SYMPTOMS: [X ]Unable to obtain due to nonverbal   Source if other than patient:  [ ]Family   [ ]Team     Pain: [ ]yes [ ]no  QOL impact -   Location -                    Aggravating factors -  Quality -  Radiation -  Timing-  Severity (0-10 scale):  Minimal acceptable level (0-10 scale):     PAIN AD Score:     [email or web address]/[email or web address] (press ctrl +  left click to view)    Dyspnea:                           [ ]Mild [ ]Moderate [ ]Severe  Anxiety:                             [ ]Mild [ ]Moderate [ ]Severe  Fatigue:                             [ ]Mild [ ]Moderate [ ]Severe  Nausea:                             [ ]Mild [ ]Moderate [ ]Severe  Loss of appetite:              [ ]Mild [ ]Moderate [ ]Severe  Constipation:                    [ ]Mild [ ]Moderate [ ]Severe    Other Symptoms:  [X ]All other review of systems negative     Palliative Performance Status Version 2:         %    http://npcrc.org/files/news/palliative_performance_scale_ppsv2.pdf    PHYSICAL EXAM:  Vital Signs Last 24 Hrs  T(C): 36.8 (14 Sep 2022 08:52), Max: 37.9 (13 Sep 2022 13:55)  T(F): 98.2 (14 Sep 2022 08:52), Max: 100.2 (13 Sep 2022 13:55)  HR: 93 (14 Sep 2022 08:52) (88 - 110)  BP: 95/55 (14 Sep 2022 08:52) (79/44 - 146/83)  BP(mean): 67 (14 Sep 2022 07:37) (57 - 107)  RR: 20 (14 Sep 2022 08:52) (17 - 33)  SpO2: 96% (14 Sep 2022 08:52) (83% - 100%)    GENERAL:  [ ]Alert  [ ]Oriented x   [ ]Lethargic  [ ]Cachexia  [ ]Unarousable  [ ]Verbal  [ ]Non-Verbal  Behavioral:   [ ] Anxiety  [ ] Delirium [ ] Agitation [ ] Other  HEENT:  [ ]Normal   [ ]Dry mouth   [ ]ET Tube/Trach  [ ]Oral lesions  PULMONARY:   [ ]Clear [ ]Tachypnea  [ ]Audible excessive secretions   [ ]Rhonchi        [ ]Right [ ]Left [ ]Bilateral  [ ]Crackles        [ ]Right [ ]Left [ ]Bilateral  [ ]Wheezing     [ ]Right [ ]Left [ ]Bilateral  [ ]Diminished breath sounds [ ]right [ ]left [ ]bilateral  CARDIOVASCULAR:    [ ]Regular [ ]Irregular [ ]Tachy  [ ]Anant [ ]Murmur [ ]Other  GASTROINTESTINAL:  [ ]Soft  [ ]Distended   [ ]+BS  [ ]Non tender [ ]Tender  [ ]PEG [ ]OGT/ NGT  Last BM:   GENITOURINARY:  [ ]Normal [ ] Incontinent   [ ]Oliguria/Anuria   [ ]Funez  MUSCULOSKELETAL:   [ ]Normal   [ ]Weakness  [ ]Bed/Wheelchair bound [ ]Edema  NEUROLOGIC:   [ ]No focal deficits  [ ]Cognitive impairment  [ ]Dysphagia [ ]Dysarthria [ ]Paresis [ ]Other   SKIN:   [ ]Normal    [ ]Rash  [ ]Pressure ulcer(s)       Present on admission [ ]y [ ]n    CRITICAL CARE:  [ ] Shock Present  [ ]Septic [ ]Cardiogenic [ ]Neurologic [ ]Hypovolemic  [ ]  Vasopressors [X ]  Inotropes   [ ]Respiratory failure present [ ]Mechanical ventilation [ ]Non-invasive ventilatory support [ ]High flow  [ ]Acute  [ ]Chronic [ ]Hypoxic  [ ]Hypercarbic [ ]Other  [ ]Other organ failure     LABS:                        9.3    1.99  )-----------( 155      ( 14 Sep 2022 06:31 )             28.5   -    142  |  112<H>  |  51<H>  ----------------------------<  104<H>  5.1<H>   |  13<L>  |  2.9<H>    Ca    7.6<L>      14 Sep 2022 05:00  Mg     1.9         TPro  4.9<L>  /  Alb  2.8<L>  /  TBili  0.5  /  DBili  x   /  AST  59<H>  /  ALT  12  /  AlkPhos  46  09-14  PT/INR - ( 13 Sep 2022 17:19 )   PT: 13.30 sec;   INR: 1.16 ratio         PTT - ( 13 Sep 2022 17:19 )  PTT:30.3 sec    Urinalysis Basic - ( 13 Sep 2022 18:56 )    Color: Yellow / Appearance: Slightly Turbid / S.016 / pH: x  Gluc: x / Ketone: Negative  / Bili: Negative / Urobili: <2 mg/dL   Blood: x / Protein: 30 mg/dL / Nitrite: Positive   Leuk Esterase: Large / RBC: 1 /HPF / WBC 30 /HPF   Sq Epi: x / Non Sq Epi: 0 /HPF / Bacteria: Many      RADIOLOGY & ADDITIONAL STUDIES:    < from: Xray Chest 1 View- PORTABLE-Urgent (22 @ 16:36) >    Impression:    Right perihilar opacity.    --- End of Report ---    < end of copied text >        REFERRALS:   [ ]Chaplaincy  [ ]Hospice  [ ]Child Life  [ ]Social Work  [ ]Case management [ ]Holistic Therapy     Goals of Care Document:      HPI:    89y F PMH of Dementia, HTN, DLD, CHF, Breast CA presents for eval of weakness. Patient is NON Verbal so Hx was obtained from daughter. According to daughter, Patient appears weaker than usual yesterday while using walker to ambulate. Today, Patient has been too weak to ambulate at all with associated decreased PO intake and nasal congestion. I am unable to obtain a comprehensive history, review of systems, past medical history, and/or physical exam due to constraints imposed by the patient's clinical condition and/or mental status.    In ED vitals: /55, HR 98, RR 18, Temp 100.8, Sat 99%  Labs: WBC 2.4, Hg 9.2, .5, Lactate 6.1, BUN 51, Creat 3, GFR 14, PH 7.27, PCO2 54, U/A positive, Respiratory panel negative  CXR showed infiltrates more on the right lower lobe (13 Sep 2022 22:17)    PERTINENT PM/SXH:   HTN (hypertension)    Diastolic dysfunction    HLD (hyperlipidemia)      H/O bilateral hip replacements    History of cholecystectomy      FAMILY HISTORY:    ITEMS NOT CHECKED ARE NOT PRESENT    SOCIAL HISTORY:   Significant other/partner[ ]  Children[X ]  Spiritism/Spirituality:  Substance hx:  [ ]   Tobacco hx:  [ ]   Alcohol hx: [ ]   Living Situation: [X ]Home  [ ]Long term care  [ ]Rehab [ ]Other  Home Services: [ X] HHA [ ] Visting RN [ ] Hospice  Occupation:  Home Opioid hx:  [ ] Y [ ] N [ X] I-Stop Reference No: This report was requested by: Comfort Barton | Reference #: [id number]    ADVANCE DIRECTIVES:    MOLST  [ X]  Living Will  [ ]   DECISION MAKER(s): Walesak Lu dtr  [ ] Health Care Proxy(s)  [ X] Surrogate(s)  [ ] Guardian           Name(s): Phone Number(s): Daughter Waleska     BASELINE (I)ADL(s) (prior to admission):  [place]: [ ]Total  [X ] Moderate [ ]Dependent  Palliative Performance Status Version 2:        40 %    http://npcrc.org/files/news/palliative_performance_scale_ppsv2.pdf    Allergies    No Known Allergies    Intolerances    MEDICATIONS  (STANDING):  ALBUTerol    90 MICROgram(s) HFA Inhaler 2 Puff(s) Inhalation every 6 hours  atorvastatin Oral Tab/Cap - Peds 10 milliGRAM(s) Oral daily  heparin   Injectable 5000 Unit(s) SubCutaneous every 12 hours  norepinephrine Infusion 0.1 MICROgram(s)/kG/Min (7.74 mL/Hr) IV Continuous <Continuous>  sodium zirconium cyclosilicate 10 Gram(s) Oral once  vancomycin  IVPB 500 milliGRAM(s) IV Intermittent every 24 hours    MEDICATIONS  (PRN):  acetaminophen     Tablet .. 650 milliGRAM(s) Oral every 6 hours PRN Temp greater or equal to 38C (100.4F), Mild Pain (1 - 3)    PRESENT SYMPTOMS: [X ]Unable to obtain due to nonverbal   Source if other than patient:  [ ]Family   [ ]Team     PAIN AD Score:  0    [email or web address]/[email or web address] (press ctrl +  left click to view)    Dyspnea:                           [ ]Mild [ ]Moderate [ ]Severe  Anxiety:                             [ ]Mild [ ]Moderate [ ]Severe  Fatigue:                             [ ]Mild [ ]Moderate [ ]Severe  Nausea:                             [ ]Mild [ ]Moderate [ ]Severe  Loss of appetite:              [ ]Mild [ ]Moderate [ ]Severe  Constipation:                    [ ]Mild [ ]Moderate [ ]Severe    Other Symptoms:  [X ]All other review of systems negative     Palliative Performance Status Version 2:      10   %    http://npcrc.org/files/news/palliative_performance_scale_ppsv2.pdf    PHYSICAL EXAM:  ICU Vital Signs Last 24 Hrs  T(C): 36.8 (14 Sep 2022 08:52), Max: 37.6 (13 Sep 2022 16:57)  T(F): 98.2 (14 Sep 2022 08:52), Max: 99.7 (13 Sep 2022 16:57)  HR: 97 (14 Sep 2022 13:00) (88 - 110)  BP: 103/57 (14 Sep 2022 13:00) (79/44 - 146/83)  BP(mean): 67 (14 Sep 2022 07:37) (57 - 107)  RR: 20 (14 Sep 2022 08:52) (17 - 33)  SpO2: 96% (14 Sep 2022 08:52) (83% - 100%)    GENERAL:  [ X ]Alert  [ ]Oriented x   [ ]Lethargic  [ ]Cachexia  [ ]Unarousable  [ ]Verbal  [ X]Non-Verbal  Behavioral:   [ ] Anxiety  [ ] Delirium [ ] Agitation [X ] Calm   HEENT:  [ X]Normal   [ ]Dry mouth   [ ]ET Tube/Trach  [ ]Oral lesions  PULMONARY:   [ X]Clear [ ]Tachypnea  [ ]Audible excessive secretions   On Bipap   CARDIOVASCULAR:    [X ]Regular [ ]Irregular [ ]Tachy  [ ]Anant [ ]Murmur [ ]Other  GASTROINTESTINAL:  [ X]Soft  [ ]Distended   [ ]+BS  [ ]Non tender [ ]Tender  [ ]PEG [ ]OGT/ NGT  Last BM:   GENITOURINARY:  [ ]Normal [ ] Incontinent   [ ]Oliguria/Anuria   [ X]Funez  MUSCULOSKELETAL:   [ ]Normal   [ ]Weakness  [ X ]Bed/Wheelchair bound [ ]Edema  NEUROLOGIC:   [ ]No focal deficits  [ X ]Cognitive impairment  [ ]Dysphagia [ ]Dysarthria [ ]Paresis [ ]Other   SKIN:   [ X ]Normal    [ ]Rash  [ ]Pressure ulcer(s)       Present on admission [ ]y [ ]n    CRITICAL CARE:  [ ] Shock Present  [ ]Septic [ ]Cardiogenic [ ]Neurologic [ ]Hypovolemic  [ X  Vasopressors [ ]  Inotropes   [ ]Respiratory failure present [ ]Mechanical ventilation [ X]Non-invasive ventilatory support [ ]High flow  [ ]Acute  [ ]Chronic [ ]Hypoxic  [ ]Hypercarbic [ ]Other  [ ]Other organ failure     LABS:                        9.3    1.99  )-----------( 155      ( 14 Sep 2022 06:31 )             28.5       142  |  112<H>  |  51<H>  ----------------------------<  104<H>  5.1<H>   |  13<L>  |  2.9<H>    Ca    7.6<L>      14 Sep 2022 05:00  Mg     1.9         TPro  4.9<L>  /  Alb  2.8<L>  /  TBili  0.5  /  DBili  x   /  AST  59<H>  /  ALT  12  /  AlkPhos  46    PT/INR - ( 13 Sep 2022 17:19 )   PT: 13.30 sec;   INR: 1.16 ratio         PTT - ( 13 Sep 2022 17:19 )  PTT:30.3 sec    Urinalysis Basic - ( 13 Sep 2022 18:56 )    Color: Yellow / Appearance: Slightly Turbid / S.016 / pH: x  Gluc: x / Ketone: Negative  / Bili: Negative / Urobili: <2 mg/dL   Blood: x / Protein: 30 mg/dL / Nitrite: Positive   Leuk Esterase: Large / RBC: 1 /HPF / WBC 30 /HPF   Sq Epi: x / Non Sq Epi: 0 /HPF / Bacteria: Many      RADIOLOGY & ADDITIONAL STUDIES:    < from: Xray Chest 1 View- PORTABLE-Urgent (22 @ 16:36) >    Impression:    Right perihilar opacity.    --- End of Report ---    < end of copied text >     HPI:    89y F PMH of Dementia, HTN, DLD, CHF, Breast CA presents for eval of weakness. Patient is NON Verbal so Hx was obtained from daughter. According to daughter, Patient appears weaker than usual yesterday while using walker to ambulate. Today, Patient has been too weak to ambulate at all with associated decreased PO intake and nasal congestion. I am unable to obtain a comprehensive history, review of systems, past medical history, and/or physical exam due to constraints imposed by the patient's clinical condition and/or mental status.    In ED vitals: /55, HR 98, RR 18, Temp 100.8, Sat 99%  Labs: WBC 2.4, Hg 9.2, .5, Lactate 6.1, BUN 51, Creat 3, GFR 14, PH 7.27, PCO2 54, U/A positive, Respiratory panel negative  CXR showed infiltrates more on the right lower lobe (13 Sep 2022 22:17)    PERTINENT PM/SXH:   HTN (hypertension)    Diastolic dysfunction    HLD (hyperlipidemia)      H/O bilateral hip replacements    History of cholecystectomy      FAMILY HISTORY: cannot obtain from patient    ITEMS NOT CHECKED ARE NOT PRESENT    SOCIAL HISTORY:   Significant other/partner[ ]  Children[X ]  Jain/Spirituality:  Substance hx:  [ ]   Tobacco hx:  [ ]   Alcohol hx: [ ]   Living Situation: [X ]Home  [ ]Long term care  [ ]Rehab [ ]Other  Home Services: [ X] HHA [ ] Visting RN [ ] Hospice  Occupation:  Home Opioid hx:  [ ] Y [ ] N [ X] I-Stop Reference No: This report was requested by: Comfort Barton | Reference #: [id number]    ADVANCE DIRECTIVES:    MOLST  [ X]  Living Will  [ ]   DECISION MAKER(s): Waleska Lu dtr  [ ] Health Care Proxy(s)  [ X] Surrogate(s)  [ ] Guardian           Name(s): Phone Number(s): Daughter Waleska     BASELINE (I)ADL(s) (prior to admission):  [place]: [ ]Total  [X ] Moderate [ ]Dependent  Palliative Performance Status Version 2:        40 %    http://npcrc.org/files/news/palliative_performance_scale_ppsv2.pdf    Allergies    No Known Allergies    Intolerances    MEDICATIONS  (STANDING):  ALBUTerol    90 MICROgram(s) HFA Inhaler 2 Puff(s) Inhalation every 6 hours  atorvastatin Oral Tab/Cap - Peds 10 milliGRAM(s) Oral daily  heparin   Injectable 5000 Unit(s) SubCutaneous every 12 hours  norepinephrine Infusion 0.1 MICROgram(s)/kG/Min (7.74 mL/Hr) IV Continuous <Continuous>  sodium zirconium cyclosilicate 10 Gram(s) Oral once  vancomycin  IVPB 500 milliGRAM(s) IV Intermittent every 24 hours    MEDICATIONS  (PRN):  acetaminophen     Tablet .. 650 milliGRAM(s) Oral every 6 hours PRN Temp greater or equal to 38C (100.4F), Mild Pain (1 - 3)    PRESENT SYMPTOMS: [X ]Unable to obtain due to nonverbal   Source if other than patient:  [ ]Family   [ ]Team     PAIN AD Score:  0    [email or web address]/[email or web address] (press ctrl +  left click to view)    Dyspnea:                           [ ]Mild [ ]Moderate [ ]Severe  Anxiety:                             [ ]Mild [ ]Moderate [ ]Severe  Fatigue:                             [ ]Mild [ ]Moderate [ ]Severe  Nausea:                             [ ]Mild [ ]Moderate [ ]Severe  Loss of appetite:              [ ]Mild [ ]Moderate [ ]Severe  Constipation:                    [ ]Mild [ ]Moderate [ ]Severe    Other Symptoms:  [X ]All other review of systems negative     Palliative Performance Status Version 2:      10   %    http://npcrc.org/files/news/palliative_performance_scale_ppsv2.pdf    PHYSICAL EXAM:  ICU Vital Signs Last 24 Hrs  T(C): 36.8 (14 Sep 2022 08:52), Max: 37.6 (13 Sep 2022 16:57)  T(F): 98.2 (14 Sep 2022 08:52), Max: 99.7 (13 Sep 2022 16:57)  HR: 97 (14 Sep 2022 13:00) (88 - 110)  BP: 103/57 (14 Sep 2022 13:00) (79/44 - 146/83)  BP(mean): 67 (14 Sep 2022 07:37) (57 - 107)  RR: 20 (14 Sep 2022 08:52) (17 - 33)  SpO2: 96% (14 Sep 2022 08:52) (83% - 100%)    GENERAL:  [  ]Alert  [ ]Oriented x   [ ]Lethargic  [ ]Cachexia  [ ]Unarousable  [ ]Verbal  [ X]Non-Verbal  Behavioral:   [ ] Anxiety  [ ] Delirium [ ] Agitation [X ] Calm   HEENT:  [ X]Normal   [ ]Dry mouth   [ ]ET Tube/Trach  [ ]Oral lesions  PULMONARY:   [ X]Clear [ ]Tachypnea  [ ]Audible excessive secretions   On Bipap   CARDIOVASCULAR:    [X ]Regular [ ]Irregular [ ]Tachy  [ ]Anant [ ]Murmur [ ]Other  GASTROINTESTINAL:  [ X]Soft  [ ]Distended   [ ]+BS  [ ]Non tender [ ]Tender  [ ]PEG [ ]OGT/ NGT  Last BM:   GENITOURINARY:  [ ]Normal [ ] Incontinent   [ ]Oliguria/Anuria   [ X]Funez  MUSCULOSKELETAL:   [ ]Normal   [ ]Weakness  [ X ]Bed/Wheelchair bound [ ]Edema  NEUROLOGIC:   [ ]No focal deficits  [ X ]Cognitive impairment  [ ]Dysphagia [ ]Dysarthria [ ]Paresis [ ]Other   SKIN:   [ X ]Normal    [ ]Rash  [ ]Pressure ulcer(s)       Present on admission [ ]y [ ]n    CRITICAL CARE:  [ ] Shock Present  [ ]Septic [ ]Cardiogenic [ ]Neurologic [ ]Hypovolemic  [ X  Vasopressors [ ]  Inotropes   [ ]Respiratory failure present [ ]Mechanical ventilation [ X]Non-invasive ventilatory support [ ]High flow  [ ]Acute  [ ]Chronic [ ]Hypoxic  [ ]Hypercarbic [ ]Other  [ ]Other organ failure     LABS: reviewed                        9.3    1.99  )-----------( 155      ( 14 Sep 2022 06:31 )             28.5       142  |  112<H>  |  51<H>  ----------------------------<  104<H>  5.1<H>   |  13<L>  |  2.9<H>    Ca    7.6<L>      14 Sep 2022 05:00  Mg     1.9         TPro  4.9<L>  /  Alb  2.8<L>  /  TBili  0.5  /  DBili  x   /  AST  59<H>  /  ALT  12  /  AlkPhos  46    PT/INR - ( 13 Sep 2022 17:19 )   PT: 13.30 sec;   INR: 1.16 ratio         PTT - ( 13 Sep 2022 17:19 )  PTT:30.3 sec    Urinalysis Basic - ( 13 Sep 2022 18:56 )    Color: Yellow / Appearance: Slightly Turbid / S.016 / pH: x  Gluc: x / Ketone: Negative  / Bili: Negative / Urobili: <2 mg/dL   Blood: x / Protein: 30 mg/dL / Nitrite: Positive   Leuk Esterase: Large / RBC: 1 /HPF / WBC 30 /HPF   Sq Epi: x / Non Sq Epi: 0 /HPF / Bacteria: Many      RADIOLOGY & ADDITIONAL STUDIES:    < from: Xray Chest 1 View- PORTABLE-Urgent (22 @ 16:36) >    Impression:    Right perihilar opacity.    --- End of Report ---    < end of copied text >

## 2022-09-14 NOTE — CONSULT NOTE ADULT - SUBJECTIVE AND OBJECTIVE BOX
KYRA MENDIOLA  89y, Female  Allergy: No Known Allergies      CHIEF COMPLAINT:   Weakness, (14 Sep 2022 06:01)      LOS  1d    HPI  HPI:  89y F PMH of Dementia, HTN, DLD, CHF, Breast CA presents for eval of weakness. Patient is NON Verbal so Hx was obtained from daughter. According to daughter, Patient appears weaker than usual yesterday while using walker to ambulate. Today, Patient has been too weak to ambulate at all with associated decreased PO intake and nasal congestion. I am unable to obtain a comprehensive history, review of systems, past medical history, and/or physical exam due to constraints imposed by the patient's clinical condition and/or mental status.    In ED vitals: /55, HR 98, RR 18, Temp 100.8, Sat 99%  Labs: WBC 2.4, Hg 9.2, .5, Lactate 6.1, BUN 51, Creat 3, GFR 14, PH 7.27, PCO2 54, U/A positive, Respiratory panel negative  CXR showed infiltrates more on the right lower lobe (13 Sep 2022 22:17)      INFECTIOUS DISEASE HISTORY:  ID consulted for UTI and PNA  on [facility]  PAST MEDICAL & SURGICAL HISTORY:  HTN (hypertension)      Diastolic dysfunction      HLD (hyperlipidemia)      H/O bilateral hip replacements  Right Hip ORIF on Xray      History of cholecystectomy          FAMILY HISTORY  No pertinent family history in first degree relatives        SOCIAL HISTORY  Social History:        ROS  unable to obtain history secondary to patient's mental status and/or sedation     VITALS:  T(F): 98.2, Max: 100.2 (22 @ 13:55)  HR: 93  BP: 95/55  RR: 20Vital Signs Last 24 Hrs  T(C): 36.8 (14 Sep 2022 08:52), Max: 37.9 (13 Sep 2022 13:55)  T(F): 98.2 (14 Sep 2022 08:52), Max: 100.2 (13 Sep 2022 13:55)  HR: 93 (14 Sep 2022 08:52) (88 - 110)  BP: 95/55 (14 Sep 2022 08:52) (79/44 - 146/83)  BP(mean): 67 (14 Sep 2022 07:37) (57 - 107)  RR: 20 (14 Sep 2022 08:52) (17 - 33)  SpO2: 96% (14 Sep 2022 08:52) (83% - 100%)    Parameters below as of 14 Sep 2022 08:52  Patient On (Oxygen Delivery Method): room air        PHYSICAL EXAM:  ***    TESTS & MEASUREMENTS:                        9.3    1.99  )-----------( 155      ( 14 Sep 2022 06:31 )             28.5         142  |  112<H>  |  51<H>  ----------------------------<  104<H>  5.1<H>   |  13<L>  |  2.9<H>    Ca    7.6<L>      14 Sep 2022 05:00  Mg     1.9         TPro  4.9<L>  /  Alb  2.8<L>  /  TBili  0.5  /  DBili  x   /  AST  59<H>  /  ALT  12  /  AlkPhos  46        LIVER FUNCTIONS - ( 14 Sep 2022 00:50 )  Alb: 2.8 g/dL / Pro: 4.9 g/dL / ALK PHOS: 46 U/L / ALT: 12 U/L / AST: 59 U/L / GGT: x           Urinalysis Basic - ( 13 Sep 2022 18:56 )    Color: Yellow / Appearance: Slightly Turbid / S.016 / pH: x  Gluc: x / Ketone: Negative  / Bili: Negative / Urobili: <2 mg/dL   Blood: x / Protein: 30 mg/dL / Nitrite: Positive   Leuk Esterase: Large / RBC: 1 /HPF / WBC 30 /HPF   Sq Epi: x / Non Sq Epi: 0 /HPF / Bacteria: Many          Lactate, Blood: 6.0 mmol/L (22 @ 05:00)  Lactate, Blood: 6.2 mmol/L (22 @ 00:50)  Lactate, Blood: 6.1 mmol/L (22 @ 18:56)  Blood Gas Venous - Lactate: 3.40 mmol/L (22 @ 17:00)      INFECTIOUS DISEASES TESTING      INFLAMMATORY MARKERS      RADIOLOGY & ADDITIONAL TESTS:  I have personally reviewed the last Chest xray  CXR  Xray Chest 1 View- PORTABLE-Urgent:   ACC: [id number] EXAM:  XR CHEST PORTABLE URGENT 1V                          PROCEDURE DATE:  2022          INTERPRETATION:  Clinical History / Reason for exam: Sepsis.    Comparison : Chest radiograph 2019.    Technique/Positioning: Singlefrontal chest x-ray obtained.    Findings:    Support devices: None.    Cardiac/mediastinum/hilum: Unchanged.    Lung parenchyma/Pleura: Right perihilar opacity.    Skeleton/soft tissues: Unchanged.    Impression:    Right perihilar opacity.    --- End of Report ---            FELICIA MERINO MD; Attending Radiologist  This document has been electronically signed. Sep 14 2022  2:07AM (22 @ 16:36)      CT      CARDIOLOGY TESTING  12 Lead ECG:   Ventricular Rate 98 BPM    Atrial Rate 98 BPM    P-R Interval 132 ms    QRS Duration 100 ms    Q-T Interval 348 ms    QTC Calculation(Bazett) 444 ms    P Axis 69 degrees    R Axis 13 degrees    T Axis 116 degrees    Diagnosis Line Normal sinus rhythm  Inferior infarct , age undetermined  ST & T wave abnormality, consider lateral ischemia  Abnormal ECG    Confirmed by Kolby Oviedo ([id number]) on 2022 3:54:14 PM (22 @ 14:27)      MEDICATIONS  ALBUTerol    90 MICROgram(s) HFA Inhaler 2 Inhalation every 6 hours  atorvastatin Oral Tab/Cap - Peds 10 Oral daily  heparin   Injectable 5000 SubCutaneous every 12 hours  norepinephrine Infusion 0.1 IV Continuous <Continuous>  sodium zirconium cyclosilicate 10 Oral once  vancomycin  IVPB 500 IV Intermittent every 24 hours        ANTIBIOTICS:  vancomycin  IVPB 500 milliGRAM(s) IV Intermittent every 24 hours      ALLERGIES:  No Known Allergies         KYRA MENDIOLA  89y, Female  Allergy: No Known Allergies      CHIEF COMPLAINT:   Weakness, (14 Sep 2022 06:01)      LOS  1d    HPI  HPI:  89y F PMH of Dementia, HTN, DLD, CHF, Breast CA presents for eval of weakness. Patient is NON Verbal so Hx was obtained from daughter. According to daughter, Patient appears weaker than usual yesterday while using walker to ambulate. Today, Patient has been too weak to ambulate at all with associated decreased PO intake and nasal congestion. I am unable to obtain a comprehensive history, review of systems, past medical history, and/or physical exam due to constraints imposed by the patient's clinical condition and/or mental status.    In ED vitals: /55, HR 98, RR 18, Temp 100.8, Sat 99%  Labs: WBC 2.4, Hg 9.2, .5, Lactate 6.1, BUN 51, Creat 3, GFR 14, PH 7.27, PCO2 54, U/A positive, Respiratory panel negative  CXR showed infiltrates more on the right lower lobe (13 Sep 2022 22:17)      INFECTIOUS DISEASE HISTORY:  ID consulted for UTI and PNA  on [facility]   unable to obtain history secondary to patient's mental status and/or sedation     PMH  PAST MEDICAL & SURGICAL HISTORY:  HTN (hypertension)      Diastolic dysfunction      HLD (hyperlipidemia)      H/O bilateral hip replacements  Right Hip ORIF on Xray      History of cholecystectomy          FAMILY HISTORY  No pertinent family history in first degree relatives        SOCIAL HISTORY  Social History:        ROS  unable to obtain history secondary to patient's mental status and/or sedation     VITALS:  T(F): 98.2, Max: 100.2 (22 @ 13:55)  HR: 93  BP: 95/55  RR: 20Vital Signs Last 24 Hrs  T(C): 36.8 (14 Sep 2022 08:52), Max: 37.9 (13 Sep 2022 13:55)  T(F): 98.2 (14 Sep 2022 08:52), Max: 100.2 (13 Sep 2022 13:55)  HR: 93 (14 Sep 2022 08:52) (88 - 110)  BP: 95/55 (14 Sep 2022 08:52) (79/44 - 146/83)  BP(mean): 67 (14 Sep 2022 07:37) (57 - 107)  RR: 20 (14 Sep 2022 08:52) (17 - 33)  SpO2: 96% (14 Sep 2022 08:52) (83% - 100%)    Parameters below as of 14 Sep 2022 08:52  Patient On (Oxygen Delivery Method): room air        PHYSICAL EXAM:  Gen: Elderly F on BIPAP not answering questions  HEENT: Normocephalic, atraumatic  Neck: supple, no lymphadenopathy  CV: Regular rate & regular rhythm  Lungs: decreased BS at bases, no fremitus  Abdomen: Soft, BS present, no suprapubic tenderness to palpation   Ext: Warm, well perfused no edema  Neuro: non focal, awake  Skin: no rash, no erythema  Lines: no phlebitis     TESTS & MEASUREMENTS:                        9.3    1.99  )-----------( 155      ( 14 Sep 2022 06:31 )             28.5         142  |  112<H>  |  51<H>  ----------------------------<  104<H>  5.1<H>   |  13<L>  |  2.9<H>    Ca    7.6<L>      14 Sep 2022 05:00  Mg     1.9         TPro  4.9<L>  /  Alb  2.8<L>  /  TBili  0.5  /  DBili  x   /  AST  59<H>  /  ALT  12  /  AlkPhos  46        LIVER FUNCTIONS - ( 14 Sep 2022 00:50 )  Alb: 2.8 g/dL / Pro: 4.9 g/dL / ALK PHOS: 46 U/L / ALT: 12 U/L / AST: 59 U/L / GGT: x           Urinalysis Basic - ( 13 Sep 2022 18:56 )    Color: Yellow / Appearance: Slightly Turbid / S.016 / pH: x  Gluc: x / Ketone: Negative  / Bili: Negative / Urobili: <2 mg/dL   Blood: x / Protein: 30 mg/dL / Nitrite: Positive   Leuk Esterase: Large / RBC: 1 /HPF / WBC 30 /HPF   Sq Epi: x / Non Sq Epi: 0 /HPF / Bacteria: Many          Lactate, Blood: 6.0 mmol/L (22 @ 05:00)  Lactate, Blood: 6.2 mmol/L (22 @ 00:50)  Lactate, Blood: 6.1 mmol/L (22 @ 18:56)  Blood Gas Venous - Lactate: 3.40 mmol/L (22 @ 17:00)      INFECTIOUS DISEASES TESTING      INFLAMMATORY MARKERS      RADIOLOGY & ADDITIONAL TESTS:  I have personally reviewed the last Chest xray  CXR  Xray Chest 1 View- PORTABLE-Urgent:   ACC: [id number] EXAM:  XR CHEST PORTABLE URGENT 1V                          PROCEDURE DATE:  2022          INTERPRETATION:  Clinical History / Reason for exam: Sepsis.    Comparison : Chest radiograph 2019.    Technique/Positioning: Singlefrontal chest x-ray obtained.    Findings:    Support devices: None.    Cardiac/mediastinum/hilum: Unchanged.    Lung parenchyma/Pleura: Right perihilar opacity.    Skeleton/soft tissues: Unchanged.    Impression:    Right perihilar opacity.    --- End of Report ---            FELICIA MERINO MD; Attending Radiologist  This document has been electronically signed. Sep 14 2022  2:07AM (22 @ 16:36)      CT      CARDIOLOGY TESTING  12 Lead ECG:   Ventricular Rate 98 BPM    Atrial Rate 98 BPM    P-R Interval 132 ms    QRS Duration 100 ms    Q-T Interval 348 ms    QTC Calculation(Bazett) 444 ms    P Axis 69 degrees    R Axis 13 degrees    T Axis 116 degrees    Diagnosis Line Normal sinus rhythm  Inferior infarct , age undetermined  ST & T wave abnormality, consider lateral ischemia  Abnormal ECG    Confirmed by Kolby Oviedo ([id number]) on 2022 3:54:14 PM (22 @ 14:27)      MEDICATIONS  ALBUTerol    90 MICROgram(s) HFA Inhaler 2 Inhalation every 6 hours  atorvastatin Oral Tab/Cap - Peds 10 Oral daily  heparin   Injectable 5000 SubCutaneous every 12 hours  norepinephrine Infusion 0.1 IV Continuous <Continuous>  sodium zirconium cyclosilicate 10 Oral once  vancomycin  IVPB 500 IV Intermittent every 24 hours        ANTIBIOTICS:  vancomycin  IVPB 500 milliGRAM(s) IV Intermittent every 24 hours      ALLERGIES:  No Known Allergies

## 2022-09-14 NOTE — CONSULT NOTE ADULT - SUBJECTIVE AND OBJECTIVE BOX
Patient is a 89y old  Female who presents with a chief complaint of Weakness, (13 Sep 2022 22:17)      89y F PMH of Dementia, HTN, DLD, CHF, Breast CA presents for eval of weakness. Patient is NON Verbal so Hx was obtained from daughter. According to daughter, Patient appears weaker than usual yesterday while using walker to ambulate. Today, Patient has been too weak to ambulate at all with associated decreased PO intake and nasal congestion. I am unable to obtain a comprehensive history, review of systems, past medical history, and/or physical exam due to constraints imposed by the patient's clinical condition and/or mental status.    In ED vitals: /55, HR 98, RR 18, Temp 100.8, Sat 99%  Labs: WBC 2.4, Hg 9.2, .5, Lactate 6.1, BUN 51, Creat 3, GFR 14, PH 7.27, PCO2 54, U/A positive, Respiratory panel negative  CXR bl infiltrates, placed on BIPAP called to evaluate this am on 80%, on levophed 0.3      PAST MEDICAL & SURGICAL HISTORY:  HTN (hypertension)      Diastolic dysfunction      HLD (hyperlipidemia)      H/O bilateral hip replacements  Right Hip ORIF on Xray      History of cholecystectomy          SOCIAL HX:   Smoking    uto    FAMILY HISTORY: uto      REVIEW OF SYSTEMS uto        Allergies    No Known Allergies    Intolerances        acetaminophen     Tablet .. 650 milliGRAM(s) Oral every 6 hours PRN  ALBUTerol    90 MICROgram(s) HFA Inhaler 2 Puff(s) Inhalation every 6 hours  atorvastatin Oral Tab/Cap - Peds 10 milliGRAM(s) Oral daily  heparin   Injectable 5000 Unit(s) SubCutaneous every 12 hours  norepinephrine Infusion 0.1 MICROgram(s)/kG/Min IV Continuous <Continuous>  vancomycin  IVPB 500 milliGRAM(s) IV Intermittent every 24 hours  : Home Meds:      PHYSICAL EXAM    ICU Vital Signs Last 24 Hrs  T(C): 36.8 (14 Sep 2022 05:00), Max: 37.9 (13 Sep 2022 13:55)  T(F): 98.2 (14 Sep 2022 05:00), Max: 100.2 (13 Sep 2022 13:55)  HR: 95 (14 Sep 2022 05:00) (90 - 110)  BP: 109/55 (14 Sep 2022 05:00) (79/44 - 146/83)  RR: 20 (14 Sep 2022 05:00) (17 - 33)  SpO2: 98% (14 Sep 2022 05:00) (83% - 100%)    O2 Parameters below as of 14 Sep 2022 05:00  Patient On (Oxygen Delivery Method): BiPAP/CPAP            General: chronically ill looking  Lungs: bl rhonchi  Cardiovascular:  SHARIF   Abdomen: Soft, Positive BS  Extremities: No clubbing  Skin: Warm  Neurological: Non focal       22 @ 07:01  -  22 @ 06:02  --------------------------------------------------------  IN:  Total IN: 0 mL    OUT:    Voided (mL): 600 mL  Total OUT: 600 mL    Total NET: -600 mL          LABS:                          7.9    1.04  )-----------( 127      ( 14 Sep 2022 00:50 )             25.0                                               -    143  |  110  |  50<H>  ----------------------------<  125<H>  5.1<H>   |  15<L>  |  2.8<H>    Ca    7.9<L>      14 Sep 2022 00:50  Mg     2.0         TPro  4.9<L>  /  Alb  2.8<L>  /  TBili  0.5  /  DBili  x   /  AST  59<H>  /  ALT  12  /  AlkPhos  46  -14      PT/INR - ( 13 Sep 2022 17:19 )   PT: 13.30 sec;   INR: 1.16 ratio         PTT - ( 13 Sep 2022 17:19 )  PTT:30.3 sec                                       Urinalysis Basic - ( 13 Sep 2022 18:56 )    Color: Yellow / Appearance: Slightly Turbid / S.016 / pH: x  Gluc: x / Ketone: Negative  / Bili: Negative / Urobili: <2 mg/dL   Blood: x / Protein: 30 mg/dL / Nitrite: Positive   Leuk Esterase: Large / RBC: 1 /HPF / WBC 30 /HPF   Sq Epi: x / Non Sq Epi: 0 /HPF / Bacteria: Many                                                  LIVER FUNCTIONS - ( 14 Sep 2022 00:50 )  Alb: 2.8 g/dL / Pro: 4.9 g/dL / ALK PHOS: 46 U/L / ALT: 12 U/L / AST: 59 U/L / GGT: x                                                                                                                                   ABG - ( 14 Sep 2022 00:34 )  pH, Arterial: 7.19  pH, Blood: x     /  pCO2: 49    /  pO2: 44    / HCO3: 19    / Base Excess: -9.0  /  SaO2: 69.1                MEDICATIONS  (STANDING):  ALBUTerol    90 MICROgram(s) HFA Inhaler 2 Puff(s) Inhalation every 6 hours  atorvastatin Oral Tab/Cap - Peds 10 milliGRAM(s) Oral daily  heparin   Injectable 5000 Unit(s) SubCutaneous every 12 hours  norepinephrine Infusion 0.1 MICROgram(s)/kG/Min (7.74 mL/Hr) IV Continuous <Continuous>  vancomycin  IVPB 500 milliGRAM(s) IV Intermittent every 24 hours    MEDICATIONS  (PRN):  acetaminophen     Tablet .. 650 milliGRAM(s) Oral every 6 hours PRN Temp greater or equal to 38C (100.4F), Mild Pain (1 - 3)

## 2022-09-14 NOTE — SWALLOW BEDSIDE ASSESSMENT ADULT - SLP PERTINENT HISTORY OF CURRENT PROBLEM
88 yo Female with PMH of Dementia, HTN, DLD, CHF, breast CA presents for eval of weakness and decreased PO intake. Admitted with sepsis, UTI, PNA. CXR: Increased bilateral lung opacities/effusions. R/o septic shock.

## 2022-09-14 NOTE — CONSULT NOTE ADULT - ASSESSMENT
89yFemale with PMH including Dementia, HTN, DLD, CHF, Breast CA, being evaluated for GOC. Patient is nonverbal at baseline, walks with a walker. She was admitted with sepsis and per daughter they do not want any invasive procedures, but agree to tx with IV abx, peripheral pressors.     Daughter, Waleska, is the decision maker.     MEDD (morphine equivalent daily dose): 0      See Recs below.    Please call [phone] with questions or concerns 24/7.   We will continue to follow.     Discussed with primary MD.   89yFemale with PMH including Dementia, HTN, DLD, CHF, Breast CA, being evaluated for GOC. Patient is nonverbal at baseline, walks with a walker. She was admitted with sepsis and per daughter they do not want any invasive procedures, but agree to tx with IV abx, peripheral pressors.     Daughter, Waleska, is the decision maker.   She wants to continue tx with abx, peripheral pressors/IVF, and Bipap for now. She is open to a possible transition to comfort care later this week.     MEDD (morphine equivalent daily dose): 0      See Recs below.    Please call [phone] with questions or concerns 24/7.   We will continue to follow.     Discussed with primary MD.

## 2022-09-14 NOTE — ED ADULT NURSE REASSESSMENT NOTE - NS ED NURSE REASSESS COMMENT FT1
Assumed care of pt. Pt remains on Bipap at this time. Pt resting on stretcher, no s/s of distress. Pt family currently at bedside. Pt has norepinephrine @ 0.6/ hour. VSS. Pt safety and comfort measures in place. Will continue to monitor at this time. Pt is DNR/DNI.

## 2022-09-14 NOTE — ED ADULT NURSE REASSESSMENT NOTE - NS ED NURSE REASSESS COMMENT FT1
inserted    vázquez cath as ordered , urine output only 45 ml cloudy yellow , DR. Manning made aware

## 2022-09-14 NOTE — CONSULT NOTE ADULT - TREATMENT GUIDELINE COMMENT
DNR/DNI  No central line for increased support support  No feeding tubes   Continue current med mgmt

## 2022-09-14 NOTE — CONSULT NOTE ADULT - NS ATTEND AMEND GEN_ALL_CORE FT
Agree with above, patient comfortable, on BiPAP, pressors, family deciding on GOC, will follow  ______________  Jay Veliz MD  Palliative Medicine  [facility]   of Geriatric [facility] Pall[facility]  [phone]

## 2022-09-14 NOTE — ED ADULT NURSE NOTE - NSFALLRSKOUTCOME_ED_ALL_ED
Made follow up call to patient to find out if schuyler hernandez for cardiac clearance    Also left mess for Rose at Dr. Chen office to inform her     Pt surgery is mary for sept 20th.  Left total shoulder replacement.    Fall with Harm Risk

## 2022-09-15 NOTE — PROGRESS NOTE ADULT - SUBJECTIVE AND OBJECTIVE BOX
Over Night Events: events noted, on BIPAP 80%, blood cx, ID/ palliative noted. on levophed  PHYSICAL EXAM    ICU Vital Signs Last 24 Hrs  T(C): 36.8 (14 Sep 2022 08:52), Max: 36.8 (14 Sep 2022 08:52)  T(F): 98.2 (14 Sep 2022 08:52), Max: 98.2 (14 Sep 2022 08:52)  HR: 84 (14 Sep 2022 22:11) (84 - 107)  BP: 119/66 (14 Sep 2022 22:11) (90/51 - 119/66)  BP(mean): 66 (14 Sep 2022 18:03) (66 - 71)  RR: 20 (14 Sep 2022 22:11) (20 - 20)  SpO2: 96% (14 Sep 2022 22:11) (95% - 99%)    O2 Parameters below as of 14 Sep 2022 18:03  Patient On (Oxygen Delivery Method): BiPAP/CPAP    O2 Concentration (%): 80        General: ill looking  Lungs: Bilateral rhonchi  Cardiovascular: SHARIF 2.6  Abdomen: Soft, Positive BS  Extremities: No clubbing   Not following command    22 @ 07:01  -  22 @ 07:00  --------------------------------------------------------  IN:  Total IN: 0 mL    OUT:    Indwelling Catheter - Urethral (mL): 45 mL    Voided (mL): 600 mL  Total OUT: 645 mL    Total NET: -645 mL          LABS:                          9.3    1.99  )-----------( 155      ( 14 Sep 2022 06:31 )             28.5                                               09-14    141  |  112<H>  |  54<H>  ----------------------------<  72  5.1<H>   |  16<L>  |  2.8<H>    Ca    7.7<L>      14 Sep 2022 17:40  Mg     1.9         TPro  4.9<L>  /  Alb  2.8<L>  /  TBili  0.5  /  DBili  x   /  AST  59<H>  /  ALT  12  /  AlkPhos  46  09-14      PT/INR - ( 13 Sep 2022 17:19 )   PT: 13.30 sec;   INR: 1.16 ratio         PTT - ( 13 Sep 2022 17:19 )  PTT:30.3 sec                                       Urinalysis Basic - ( 13 Sep 2022 18:56 )    Color: Yellow / Appearance: Slightly Turbid / S.016 / pH: x  Gluc: x / Ketone: Negative  / Bili: Negative / Urobili: <2 mg/dL   Blood: x / Protein: 30 mg/dL / Nitrite: Positive   Leuk Esterase: Large / RBC: 1 /HPF / WBC 30 /HPF   Sq Epi: x / Non Sq Epi: 0 /HPF / Bacteria: Many                                                  LIVER FUNCTIONS - ( 14 Sep 2022 00:50 )  Alb: 2.8 g/dL / Pro: 4.9 g/dL / ALK PHOS: 46 U/L / ALT: 12 U/L / AST: 59 U/L / GGT: x                                                  Culture - Blood (collected 13 Sep 2022 17:19)  Source: .Blood Blood-Peripheral  Gram Stain (14 Sep 2022 17:07):    Growth in aerobic bottle: Gram Negative Rods  Preliminary Report (14 Sep 2022 17:07):    Growth in aerobic bottle: Gram Negative Rods    Culture - Blood (collected 13 Sep 2022 17:19)  Source: .Blood Blood-Peripheral  Gram Stain (14 Sep 2022 17:06):    Growth in aerobic bottle: Gram Negative Rods  Preliminary Report (14 Sep 2022 17:06):    Growth in aerobic bottle: Gram Negative Rods    ***Blood Panel PCR results on this specimen are available    approximately 3 hours after the Gram stain result.***    Gram stain, PCR, and/or culture results may not always    correspond due to difference in methodologies.    ************************************************************    This PCR assay was performed by multiplex PCR. This    Assay tests for 66 bacterial and resistance gene targets.    Please refer to the [facility] Labs test directory    at [email or web address] for details.  Organism: Blood Culture PCR (14 Sep 2022 18:59)  Organism: Blood Culture PCR (14 Sep 2022 18:59)                                                                                       ABG - ( 14 Sep 2022 00:34 )  pH, Arterial: 7.19  pH, Blood: x     /  pCO2: 49    /  pO2: 44    / HCO3: 19    / Base Excess: -9.0  /  SaO2: 69.1                MEDICATIONS  (STANDING):  ALBUTerol    90 MICROgram(s) HFA Inhaler 2 Puff(s) Inhalation every 6 hours  azithromycin  IVPB      azithromycin  IVPB 500 milliGRAM(s) IV Intermittent every 24 hours  cefepime   IVPB      cefepime   IVPB 1000 milliGRAM(s) IV Intermittent daily  heparin   Injectable 5000 Unit(s) SubCutaneous every 12 hours  norepinephrine Infusion 0.1 MICROgram(s)/kG/Min (7.74 mL/Hr) IV Continuous <Continuous>    MEDICATIONS  (PRN):  acetaminophen     Tablet .. 650 milliGRAM(s) Oral every 6 hours PRN Temp greater or equal to 38C (100.4F), Mild Pain (1 - 3)

## 2022-09-15 NOTE — ED ADULT NURSE REASSESSMENT NOTE - NS ED NURSE REASSESS COMMENT FT1
Received pt from Cynthia SMALLS and assumed care, pt not responding no any stimuli. @ 0740H pt's BP with down to 58/35, RR-7, pt remains on BIPAP. MD at bedside. @ 0750H pt was apneic, asystole on monitor, unable to appreciate any vital signs, MD pronounced death @ 0750H.

## 2022-09-15 NOTE — PROGRESS NOTE ADULT - ASSESSMENT
IMPRESSION:    Sepsis present on admission  septic shock  BL pneumonia  UTI/ G - bacteremia  Renal failure/ metabolic acidosis  severe dementia    PLAN:    CNS: Avoid CNS depressant    HEENT:  Oral care    PULMONARY:  HOB @ 45 degrees, NIV/ HHFNC keep Sao2 88 to 92%, aspiration precaution    CARDIOVASCULAR: taper pressors, IVF ( bicarb drip)    GI: GI prophylaxis                                          Feeding NPO    RENAL:  F/u  lytes.  Correct as needed. accurate I/O, Repeat CMP, renal us    INFECTIOUS DISEASE: swab for MRSA, cefepime    HEMATOLOGICAL:  DVT prophylaxis. LE doppler    ENDOCRINE:  Follow up FS.  Insulin protocol if needed. hydrocortisone 100 q 8    very poor prognosis    GOC    Palliative care fup  SDU  
89y F PMH of Dementia, HTN, DLD, CHF, Breast CA presents for eval of weakness. Found to be in septic shock likely due to PNA.    #Septic shock, POA  #Suspected GNR PNA  #Acute Hypoxic Respiratory Failure  #Lactic acidosis  CXR 09/14: Increased bilateral lung opacities/effusions  Currently on levophed  On BIPAP 80% FiO2, saturating 98-99%   ID eval appreciated  - Cont IV cefepime 2g q24h  - Cont IV azithromycin 500mg q24h  - f/u BCx, Ucx, procalcitonin  - Wean off pressors as tolerated  - Start bicarb drip  - Repeat ABG, possibly trial HFNC    #HTN/HLD  #Chronic HFpEF  - Hold oral meds    DVT PPX, heparin  DNR/DNI    #Progress Note Handoff  Pending (specify): Cont IV abx, taper pressors, wean off BIPAP if possible  Family discussion: d/w daughter regarding ongoing tx for PNA  Disposition: TBD

## 2022-09-15 NOTE — DISCHARGE NOTE FOR THE EXPIRED PATIENT - HOSPITAL COURSE
HPI  89y F PMH of Dementia, HTN, DLD, CHF, Breast CA presents for eval of weakness. Patient is NON Verbal so Hx was obtained from daughter. According to daughter, Patient appears weaker than usual yesterday while using walker to ambulate. Today, Patient has been too weak to ambulate at all with associated decreased PO intake and nasal congestion. I am unable to obtain a comprehensive history, review of systems, past medical history, and/or physical exam due to constraints imposed by the patient's clinical condition and/or mental status.    In ED vitals: /55, HR 98, RR 18, Temp 100.8, Sat 99%  Labs: WBC 2.4, Hg 9.2, .5, Lactate 6.1, BUN 51, Creat 3, GFR 14, PH 7.27, PCO2 54, U/A positive, Respiratory panel negative  CXR showed infiltrates more on the right lower lobe    Hops course:  Pt was put on abx. Pt was desating so was placed on a BIPAP.  Family was notified, Pt was DNR, DNI  Pt went into septic shock, pressors were started and was maxed out on a single pressor  Pt deteriorated quickly and the lost pulse. Pt was pronounce dead at 7:50 AM HPI  89y F PMH of Dementia, HTN, DLD, CHF, Breast CA presents for eval of weakness. Patient is NON Verbal so Hx was obtained from daughter. According to daughter, Patient appears weaker than usual yesterday while using walker to ambulate. Today, Patient has been too weak to ambulate at all with associated decreased PO intake and nasal congestion. I am unable to obtain a comprehensive history, review of systems, past medical history, and/or physical exam due to constraints imposed by the patient's clinical condition and/or mental status.    In ED vitals: /55, HR 98, RR 18, Temp 100.8, Sat 99%  Labs: WBC 2.4, Hg 9.2, .5, Lactate 6.1, BUN 51, Creat 3, GFR 14, PH 7.27, PCO2 54, U/A positive, Respiratory panel negative  CXR showed infiltrates more on the right lower lobe.    Hops course:  Pt was put on abx. Pt was desating so was placed on a BIPAP.  Family was notified, Pt was DNR, DNI  Pt went into septic shock, pressors were started and was maxed out on a single pressor  Pt deteriorated quickly and the lost pulse. Pt was pronounce dead at 7:50 AM

## 2022-09-15 NOTE — ED ADULT NURSE REASSESSMENT NOTE - NS ED NURSE REASSESS COMMENT FT1
Pt remains on Bipap at this time. Pt resting on stretcher, no s/s of distress. As per MD norepinephrine increased to @ 0.5/ hour. VSS at this time. Pt safety and comfort measures in place. Will continue to monitor at this time. Pt is DNR/DNI.

## 2022-09-16 LAB
-  AMIKACIN: SIGNIFICANT CHANGE UP
-  AMIKACIN: SIGNIFICANT CHANGE UP
-  AMOXICILLIN/CLAVULANIC ACID: SIGNIFICANT CHANGE UP
-  AMPICILLIN/SULBACTAM: SIGNIFICANT CHANGE UP
-  AMPICILLIN: SIGNIFICANT CHANGE UP
-  AZTREONAM: SIGNIFICANT CHANGE UP
-  AZTREONAM: SIGNIFICANT CHANGE UP
-  CEFAZOLIN: SIGNIFICANT CHANGE UP
-  CEFEPIME: SIGNIFICANT CHANGE UP
-  CEFEPIME: SIGNIFICANT CHANGE UP
-  CEFOXITIN: SIGNIFICANT CHANGE UP
-  CEFTAZIDIME: SIGNIFICANT CHANGE UP
-  CEFTRIAXONE: SIGNIFICANT CHANGE UP
-  CIPROFLOXACIN: SIGNIFICANT CHANGE UP
-  CIPROFLOXACIN: SIGNIFICANT CHANGE UP
-  ERTAPENEM: SIGNIFICANT CHANGE UP
-  GENTAMICIN: SIGNIFICANT CHANGE UP
-  GENTAMICIN: SIGNIFICANT CHANGE UP
-  IMIPENEM: SIGNIFICANT CHANGE UP
-  IMIPENEM: SIGNIFICANT CHANGE UP
-  LEVOFLOXACIN: SIGNIFICANT CHANGE UP
-  LEVOFLOXACIN: SIGNIFICANT CHANGE UP
-  MEROPENEM: SIGNIFICANT CHANGE UP
-  MEROPENEM: SIGNIFICANT CHANGE UP
-  NITROFURANTOIN: SIGNIFICANT CHANGE UP
-  PIPERACILLIN/TAZOBACTAM: SIGNIFICANT CHANGE UP
-  PIPERACILLIN/TAZOBACTAM: SIGNIFICANT CHANGE UP
-  TIGECYCLINE: SIGNIFICANT CHANGE UP
-  TOBRAMYCIN: SIGNIFICANT CHANGE UP
-  TOBRAMYCIN: SIGNIFICANT CHANGE UP
-  TRIMETHOPRIM/SULFAMETHOXAZOLE: SIGNIFICANT CHANGE UP
CULTURE RESULTS: SIGNIFICANT CHANGE UP
CULTURE RESULTS: SIGNIFICANT CHANGE UP
METHOD TYPE: SIGNIFICANT CHANGE UP
METHOD TYPE: SIGNIFICANT CHANGE UP
ORGANISM # SPEC MICROSCOPIC CNT: SIGNIFICANT CHANGE UP
SPECIMEN SOURCE: SIGNIFICANT CHANGE UP
SPECIMEN SOURCE: SIGNIFICANT CHANGE UP

## 2022-09-19 LAB
CULTURE RESULTS: SIGNIFICANT CHANGE UP
SPECIMEN SOURCE: SIGNIFICANT CHANGE UP

## 2022-09-20 LAB
CULTURE RESULTS: SIGNIFICANT CHANGE UP
SPECIMEN SOURCE: SIGNIFICANT CHANGE UP

## 2022-09-28 DIAGNOSIS — E78.5 HYPERLIPIDEMIA, UNSPECIFIED: ICD-10-CM

## 2022-09-28 DIAGNOSIS — R65.21 SEVERE SEPSIS WITH SEPTIC SHOCK: ICD-10-CM

## 2022-09-28 DIAGNOSIS — J44.0 CHRONIC OBSTRUCTIVE PULMONARY DISEASE WITH (ACUTE) LOWER RESPIRATORY INFECTION: ICD-10-CM

## 2022-09-28 DIAGNOSIS — E87.4 MIXED DISORDER OF ACID-BASE BALANCE: ICD-10-CM

## 2022-09-28 DIAGNOSIS — J96.01 ACUTE RESPIRATORY FAILURE WITH HYPOXIA: ICD-10-CM

## 2022-09-28 DIAGNOSIS — N17.9 ACUTE KIDNEY FAILURE, UNSPECIFIED: ICD-10-CM

## 2022-09-28 DIAGNOSIS — Z78.1 PHYSICAL RESTRAINT STATUS: ICD-10-CM

## 2022-09-28 DIAGNOSIS — F03.90 UNSPECIFIED DEMENTIA WITHOUT BEHAVIORAL DISTURBANCE: ICD-10-CM

## 2022-09-28 DIAGNOSIS — Z66 DO NOT RESUSCITATE: ICD-10-CM

## 2022-09-28 DIAGNOSIS — C50.919 MALIGNANT NEOPLASM OF UNSPECIFIED SITE OF UNSPECIFIED FEMALE BREAST: ICD-10-CM

## 2022-09-28 DIAGNOSIS — N18.9 CHRONIC KIDNEY DISEASE, UNSPECIFIED: ICD-10-CM

## 2022-09-28 DIAGNOSIS — A41.9 SEPSIS, UNSPECIFIED ORGANISM: ICD-10-CM

## 2022-09-28 DIAGNOSIS — I13.0 HYPERTENSIVE HEART AND CHRONIC KIDNEY DISEASE WITH HEART FAILURE AND STAGE 1 THROUGH STAGE 4 CHRONIC KIDNEY DISEASE, OR UNSPECIFIED CHRONIC KIDNEY DISEASE: ICD-10-CM

## 2022-09-28 DIAGNOSIS — I27.20 PULMONARY HYPERTENSION, UNSPECIFIED: ICD-10-CM

## 2022-09-28 DIAGNOSIS — Z90.49 ACQUIRED ABSENCE OF OTHER SPECIFIED PARTS OF DIGESTIVE TRACT: ICD-10-CM

## 2022-09-28 DIAGNOSIS — J18.9 PNEUMONIA, UNSPECIFIED ORGANISM: ICD-10-CM

## 2022-09-28 DIAGNOSIS — Z96.643 PRESENCE OF ARTIFICIAL HIP JOINT, BILATERAL: ICD-10-CM

## 2022-09-28 DIAGNOSIS — I50.32 CHRONIC DIASTOLIC (CONGESTIVE) HEART FAILURE: ICD-10-CM

## 2022-09-28 DIAGNOSIS — N39.0 URINARY TRACT INFECTION, SITE NOT SPECIFIED: ICD-10-CM

## 2022-09-28 DIAGNOSIS — E43 UNSPECIFIED SEVERE PROTEIN-CALORIE MALNUTRITION: ICD-10-CM

## 2023-04-24 NOTE — H&P ADULT - HISTORY OF PRESENT ILLNESS
Addended by: LATOYA ESQUIVEL on: 4/24/2023 08:31 AM     Modules accepted: Orders     85 year old female with a past medical history of HTN and Grade 1 Diastolic Dysfunction presenting for mechanical fall of same day duration. According to the patient's daughter, the patient and the aid [facility] the patient fell on her buttocks and right elbow 85 year old female with a past medical history of HTN and Grade 1 Diastolic Dysfunction presenting for mechanical fall of same day duration. According to the patient's daughter, the patient and the aid took the bus to the [facility] where the patient fell on her buttocks and right elbow. No trauma to the head. No LOC, lightheadedness, dizziness, headache, focal neurological deficits, seizure like activity, post ictal state, chest pain, SOB, palpitations. Patient's daughter reports no change throughout the day from patient's baseline status; no change in PO intake, no fevers, no chills, no increased fatigue or weakness from baseline, no change in mental status, no nausea, no vomiting, no diarrhea, no hematochezia, no melena, no dysuria, no increased frequency, no foul smelling urine, no lower extremity swelling increased from baseline, no lower extremity pain. No recent travels, no recent sick contact.  Patient lives in daughter's basement, uses a walker, does ADLs. 85 year old female with a past medical history of HTN and Grade 1 Diastolic Dysfunction presenting for mechanical fall of same day duration. According to the patient's daughter, the patient and the aid took the bus to the [facility] where the patient fell on her buttocks and right elbow. No trauma to the head. No LOC, lightheadedness, dizziness, headache, focal neurological deficits, seizure like activity, post ictal state, chest pain, SOB, palpitations. Patient's daughter reports no change throughout the day from patient's baseline status; no change in PO intake, no fevers, no chills, no increased fatigue or weakness from baseline, no change in mental status, no slurry speech, no nausea, no vomiting, no diarrhea, no hematochezia, no melena, no dysuria, no increased frequency, no foul smelling urine, no lower extremity swelling increased from baseline, no lower extremity pain. No recent travels, no recent sick contact.  Patient lives in daughter's basement, uses a walker, does ADLs.

## 2023-05-12 NOTE — DISCHARGE NOTE ADULT - CARE PROVIDER_API CALL
LABS:  cret                        7.3    6.62  )-----------( 207      ( 12 May 2023 00:50 )             25.1     05-12    140  |  105  |  33<H>  ----------------------------<  120<H>  3.6   |  25  |  1.0    Ca    8.1<L>      12 May 2023 00:50    TPro  5.5<L>  /  Alb  3.5  /  TBili  0.5  /  DBili  x   /  AST  11  /  ALT  9   /  AlkPhos  52  05-12    PT/INR - ( 12 May 2023 00:50 )   PT: 14.40 sec;   INR: 1.25 ratio         PTT - ( 12 May 2023 00:50 )  PTT:29.5 sec    < from: Xray Pelvis AP only (05.12.23 @ 01:08) >    IMPRESSION:    No acute fracture or dislocation. Mild lumbosacral spine degenerative   changes.    < end of copied text >    < from: CT Head No Cont (05.12.23 @ 01:57) >    IMPRESSION:    CT HEAD:  No acute intracranial pathology or hemorrhage. No acute calvarial   fracture.    MAXILLOFACIAL BONES:  No evidence of maxillofacial bony fracture.    CT CERVICAL SPINE:  No acute fracture or subluxation.    < end of copied text >    < from: CT Angio Abdomen and Pelvis w/ IV Cont (05.12.23 @ 02:51) >    IMPRESSION:  1.  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 Dissection flap   does not extend into the descending thoracic aorta or into any branch   vessels.  2.  No evidence of acute traumatic injury to the chest, abdomen or pelvis.  3.  No evidence of arterial extravasation into the upper GI tract or   esophagus. Note mucosal/submucosal lesions such as Nighat-Wong tear   would not be appreciated on CT.    Spoke with Brielle Sahu on 5/12/2023 at 2:40 AM with read back.    < end of copied text > Jes Deal), Internal Medicine; Pulmonary Disease  [address] [place] [address]  [address]  [place] [address]  Phone: [phone]  Fax: [phone]    Jared Enciso), Critical Care Medicine; Internal Medicine; Pulmonary Disease; Sleep Medicine  [address] [place] [address]  Phone: [phone]  Fax: [phone]    Scotty Gamboa), Cardiovascular Disease; Internal Medicine; Interventional Cardiology  [address] [place] [address]  Phone: [phone]  Fax: [phone]

## 2024-03-19 NOTE — PHYSICAL THERAPY INITIAL EVALUATION ADULT - ORIENTATION, REHAB EVAL
This is a chronic condition.  Recent lab test result discussed with the patient.  Patient presently not on therapy.   oriented to person, place, time and situation

## 2024-10-21 NOTE — ED ADULT NURSE REASSESSMENT NOTE - NS ED NURSE REASSESS COMMENT FT1
straight catheter was done successfully and pt tolerated well. urine specimen obtained and sent to lab 0

## 2024-12-23 NOTE — ED PROVIDER NOTE - NSCAREINITIATED _GEN_ER
PDMP and last note reviewed. Concern for potential med sharing with family member. Will refill x1 while covering for original prescriber.     Liya Boothe MD    
Angelica Hernandez(Resident)
